# Patient Record
Sex: FEMALE | Race: WHITE | NOT HISPANIC OR LATINO | Employment: FULL TIME | URBAN - METROPOLITAN AREA
[De-identification: names, ages, dates, MRNs, and addresses within clinical notes are randomized per-mention and may not be internally consistent; named-entity substitution may affect disease eponyms.]

---

## 2017-01-03 ENCOUNTER — OFFICE VISIT (OUTPATIENT)
Dept: OTOLARYNGOLOGY | Facility: CLINIC | Age: 57
End: 2017-01-03
Payer: COMMERCIAL

## 2017-01-03 VITALS — BODY MASS INDEX: 23.86 KG/M2 | RESPIRATION RATE: 16 BRPM | WEIGHT: 143.2 LBS | HEIGHT: 65 IN

## 2017-01-03 DIAGNOSIS — R04.0 EPISTAXIS: Primary | ICD-10-CM

## 2017-01-03 PROCEDURE — 99213 OFFICE O/P EST LOW 20 MIN: CPT | Performed by: OTOLARYNGOLOGY

## 2017-01-03 ASSESSMENT — PAIN SCALES - GENERAL: PAINLEVEL: NO PAIN (0)

## 2017-01-03 NOTE — NURSING NOTE
"Chief Complaint   Patient presents with     RECHECK     Nosebleeds       Initial Resp 16  Ht 1.651 m (5' 5\")  Wt 64.955 kg (143 lb 3.2 oz)  BMI 23.83 kg/m2 Estimated body mass index is 23.83 kg/(m^2) as calculated from the following:    Height as of this encounter: 1.651 m (5' 5\").    Weight as of this encounter: 64.955 kg (143 lb 3.2 oz).  BP completed using cuff size: NA (Not Taken)    Ashlee Price MA    "

## 2017-01-03 NOTE — PROGRESS NOTES
Chief Complaint - Epistaxis    History of Present Illness - Lenore Martinez is a 56 year old female who returns to check nose following cautery (right side) following approximately months of epistaxis. I saw her over 1 month ago. Then she notes it usually only comes out the front of the nose, but it has ran down the back of the throat at times. The bleeding was occuring approximately a few times per week. The patient can get the bleeding to stop by with pressure. The patient has never gone to the emergency department or required a blood transfusion due to nose bleeding. The patient has no personal or family history of bleeding disorders. The patient takes no blood-thinning medication.    She returns and notes much improvement with bleeding following cautery on the right side. No more blood dripping or running out. Mild left bleeding, but nothing like the right. Uses vaseline, nasal saline, and a humidifier.    Past Medical History -   Patient Active Problem List   Diagnosis     Conjunctival edema of left eye     Conjunctival injection     Keratitis     PTSD (post-traumatic stress disorder)     CARDIOVASCULAR SCREENING; LDL GOAL LESS THAN 160     Hypovitaminosis D     Fatigue       Current Medications -   Current outpatient prescriptions:      gabapentin (NEURONTIN) 300 MG capsule, Take 300 mg by mouth 3 times daily 1.5 tablets 3 times daily, Disp: , Rfl:      naproxen sodium (ALEVE) 220 MG capsule, Take 220 mg by mouth 2 times daily (with meals)., Disp: , Rfl:      Aspirin-Acetaminophen-Caffeine (EXCEDRIN MIGRAINE PO), Take  by mouth as needed., Disp: , Rfl:      Ibuprofen (MIDOL PO), Take  by mouth as needed., Disp: , Rfl:      lamoTRIgine (LAMICTAL) 100 MG tablet, Take 100 mg by mouth 3 times daily., Disp: , Rfl:      citalopram (CELEXA) 40 MG tablet, Take 40 mg by mouth daily., Disp: , Rfl:     Allergies -   Allergies   Allergen Reactions     Compazine      Anxiety         Social History -   Social History  "    Social History     Marital Status:      Spouse Name: N/A     Number of Children: N/A     Years of Education: N/A     Social History Main Topics     Smoking status: Never Smoker      Smokeless tobacco: Never Used      Comment: smoke free household.     Alcohol Use: No     Drug Use: No     Sexual Activity:     Partners: Male     Other Topics Concern     None     Social History Narrative       Family History -   Family History   Problem Relation Age of Onset     DIABETES Paternal Grandmother      DIABETES Son        Review of Systems - As per HPI and PMHx, otherwise 7 system review of the head and neck negative.    Physical Exam  Resp 16  Ht 1.651 m (5' 5\")  Wt 64.955 kg (143 lb 3.2 oz)  BMI 23.83 kg/m2  General - The patient is in no distress.  Alert and oriented x3, answers questions and cooperates with examination appropriately.   Voice and Breathing - The patient was breathing comfortably without the use of accessory muscles. There was no wheezing, stridor, or stertor.  The patients voice was clear and strong, with no dysphonia.  Head and Face - Normocephalic and atraumatic.    Eyes - Extraocular movements intact. Sclera were not icteric or injected, conjunctiva were pink and moist.  Neurologic - Cranial nerves II-XII are grossly intact. Specifically, the facial nerve is intact, House-Brackmann grade 1 of 6.   Nose - No significant external deformity. The nasal mucosa along the anterior septum on the R side shows minimal dried blood. It is dry. The L side was mostly normal with a tiny spot of blood. The septum was midline, turbinates are of normal size and position.  No polyps, masses, or purulence.    A/P - Lenore Martinez is a 56 year old female with epistaxis. She is better with the cautery on the right. Continue maintenance vaseline and nasal saline and humidifier. The left bleeding is minimal and I didn't see much today so we elected to hold off on that for now. She agrees. Return colemann.     Ramírez " MD Jayjay  Otolaryngology  [facility]

## 2017-01-03 NOTE — PATIENT INSTRUCTIONS
General Scheduling Information  To schedule your CT/MRI scan, please contact [place] [facility] at [phone]   [address] [place] [address]    To schedule your Surgery, please contact our Specialty Schedulers at [phone]    ENT Clinic Locations Clinic Hours Telephone Number     Nathaniel Steel  [address] [place] [address]   Tuesday:       8:00am -- 4:00pm    Wednesday:  8:00am - 4:00pm   To schedule an appointment with   Dr. Ro,   please contact our   Specialty Scheduling Department at:     [phone]       Nathaniel Cobos  [address] [place] [address]   Friday:          8:00am - 4:00pm         Urgent Care Locations Clinic Hours Telephone Numbers     Nathaniel Zheng  [address] [place] [address]     Monday-Friday:     11:00pm - 9:00pm    Saturday-Sunday:  9:00am - 5:00pm   [phone]     Nathaniel Cobos  [address] [place] [address]     Monday-Friday:      5:00pm - 9:00pm     Saturday-Sunday:  9:00am - 5:00pm   [phone]

## 2017-07-15 ENCOUNTER — HEALTH MAINTENANCE LETTER (OUTPATIENT)
Age: 57
End: 2017-07-15

## 2017-09-09 ENCOUNTER — TELEPHONE (OUTPATIENT)
Dept: ENDOCRINOLOGY | Facility: CLINIC | Age: 57
End: 2017-09-09

## 2017-10-13 NOTE — PATIENT INSTRUCTIONS
[facility]    If you have any questions regarding to your visit please contact your care team:       Team Red:   Clinic Hours Telephone Number   Dr. Nona Abarca  (pediatrics)  Norma Buck NP 7am-7pm  Monday - Thursday   7am-5pm  Fridays  [phone] (fax)    [facility]  [phone]   Urgent Care - [place] and [place] Monday-Friday  [place] - 11am-8pm  Saturday-Sunday  Both sites - 9am-5pm  [phone] - [facility]  [phone] - [place]       What options do I have for visits at the clinic other than the traditional office visit?  To expand how we care for you, many of our providers are utilizing electronic visits (e-visits) and telephone visits, when medically appropriate, for interactions with their patients rather than a visit in the clinic.   We also offer nurse visits for many medical concerns. Just like any other service, we will bill your insurance company for this type of visit based on time spent on the phone with your provider. Not all insurance companies cover these visits. Please check with your medical insurance if this type of visit is covered. You will be responsible for any charges that are not paid by your insurance.      E-visits via [employer]:  generally incur a $35.00 fee.  Telephone visits:  Time spent on the phone: *charged based on time that is spent on the phone in increments of 10 minutes. Estimated cost:   5-10 mins $30.00   11-20 mins. $59.00   21-30 mins. $85.00     As always, Thank you for trusting us with your health care needs!    Jaison Estrada    Atrophic Vaginitis    Atrophic vaginitis means the walls of your vagina have become thin. This happens when your body makes too little of the hormone estrogen. Menopause or surgical removal of the ovaries are the most common causes for a drop in estrogen. Breastfeeding can also cause the hormone level to drop.  Symptoms of atrophic vaginitis  include:    Dryness, soreness, burning, or itching in the vagina    Vaginal discharge  Sex can be uncomfortable, even painful. After sex, you may have bleeding from your vagina. You may also have burning or pain when you urinate.  Home care  Your healthcare provider may recommend 1 or more of these as treatment:    Vaginal creams, lotions, and lubricants. These products help relieve vaginal dryness. They don t need a prescription. They can be found in the personal care section of most pharmacies. Creams and lotions are used daily to help keep the vagina moist. Lubricants help reduce dryness and pain during sex. Choose water-based lubricants. Don t use petroleum jelly, mineral oil, or other oils. These increase the chance of infection.     Hormone therapy (HT). HT increases the amount of estrogen in your body. This can help manage or relieve symptoms. HT can be given in pill form. It may be given as a lotion, cream, ring put into the vagina, or a patch on the skin. The risks and benefits of HT vary for each woman. For instance, your risk may be higher if you have had breast cancer. Discuss this treatment with your healthcare provider. Not every woman can use HT.  You don t need to give up (abstain from) sex. In fact, regular sex can help keep vaginal tissues healthy. Take steps to make sex more comfortable by using water-based lubricants.  Preventing infections  Atrophic vaginitis makes an infection of the vagina or the urinary tract more likely. To help reduce your risk:    Keep your genitals clean. When you bathe, wash the outside of your vagina with mild soap and water. Clean gently between the folds of your vagina.    Wipe from front to back after a bowel movement.    Don t douche unless your healthcare provider tells you to.    Avoid scented toilet paper, scented vaginal sprays, and scented tampons.    Avoid wearing clothes that are tight in the crotch. These include pantyhose, jeans, and leggings. Wear cotton  underwear. Change it every day.  Follow-up care  Follow up with your healthcare provider, or as advised.  When to seek medical advice  Call your health care provider right away if any of these occur:    Fever of 100.4 F (38 C) or higher, or as directed by your healthcare provider    Symptoms don t go away or get worse even with treatment    Vaginal area swells or becomes painful    Vaginal area bleeds, but not because of your period    Bad-smelling discharge from the vagina    Pain or burning when you urinate or you have trouble passing urine    Open sores develop around vagina   Date Last Reviewed: 12/1/2016 2000-2017 [employer]. [address] [place] [address], [place] [address]. All rights reserved. This information is not intended as a substitute for professional medical care. Always follow your healthcare professional's instructions.

## 2017-10-13 NOTE — PROGRESS NOTES
"  SUBJECTIVE:   Lenore Martinez is a 57 year old female who presents to clinic today for the following health issues:    Vaginal Symptoms      Duration: 1 month    Description  vaginal discharge     Intensity:  mild    Accompanying signs and symptoms (fever/dysuria/abdominal or back pain): lump of left buttock - nearly resolved after draining spontaneously     History  Sexually active: not at present  Possibility of pregnancy: No  Recent antibiotic use: no     Precipitating or alleviating factors: None    Therapies tried and outcome: none       I have reviewed the patient's medical history in detail and updated the computerized patient record.     ROS:  C: NEGATIVE for fever, chills, change in weight  INTEGUMENTARY/SKIN: as above   GI: NEGATIVE for nausea, abdominal pain, heartburn, or change in bowel habits  : NEGATIVE for frequency, dysuria, or hematuria    OBJECTIVE:     /68 (BP Location: Right arm, Patient Position: Chair, Cuff Size: Adult Regular)  Pulse 76  Temp 97.9  F (36.6  C) (Oral)  Ht 5' 5\" (1.651 m)  Wt 143 lb (64.9 kg)  SpO2 98%  BMI 23.8 kg/m2  Body mass index is 23.8 kg/(m^2).  GENERAL: healthy, alert and no distress   (female): declined   MS: extremities normal- no gross deformities noted  NEURO: mentation intact  PSYCH: affect flat and anxious    Diagnostic Test Results:  Results for orders placed or performed in visit on 10/23/17   Wet prep   Result Value Ref Range    Specimen Description Vagina     Wet Prep No Trichomonas seen     Wet Prep No clue cells seen     Wet Prep No yeast seen     Wet Prep (Note)  MANY WBC SEEN.          ASSESSMENT/PLAN:   (N95.2) Atrophic vaginitis  (primary encounter diagnosis)  (N89.8) Vaginal discharge  Plan: Wet prep        See orders. Discussed risks and benefits of this medication. Call or return to clinic as needed if these symptoms worsen or fail to improve as anticipated.     (Z12.31) Visit for screening mammogram  Plan: MA SCREENING DIGITAL BILAT " - Future  (s+30)              See Patient Instructions    Nona Oropeza MD  [facility]

## 2017-10-23 ENCOUNTER — OFFICE VISIT (OUTPATIENT)
Dept: FAMILY MEDICINE | Facility: CLINIC | Age: 57
End: 2017-10-23
Payer: COMMERCIAL

## 2017-10-23 VITALS
WEIGHT: 143 LBS | BODY MASS INDEX: 23.82 KG/M2 | SYSTOLIC BLOOD PRESSURE: 100 MMHG | HEIGHT: 65 IN | DIASTOLIC BLOOD PRESSURE: 68 MMHG | HEART RATE: 76 BPM | OXYGEN SATURATION: 98 % | TEMPERATURE: 97.9 F

## 2017-10-23 DIAGNOSIS — N95.2 ATROPHIC VAGINITIS: Primary | ICD-10-CM

## 2017-10-23 DIAGNOSIS — Z12.31 VISIT FOR SCREENING MAMMOGRAM: ICD-10-CM

## 2017-10-23 DIAGNOSIS — N89.8 VAGINAL DISCHARGE: ICD-10-CM

## 2017-10-23 LAB
SPECIMEN SOURCE: NORMAL
WET PREP SPEC: NORMAL

## 2017-10-23 PROCEDURE — 99213 OFFICE O/P EST LOW 20 MIN: CPT | Performed by: FAMILY MEDICINE

## 2017-10-23 PROCEDURE — 87210 SMEAR WET MOUNT SALINE/INK: CPT | Performed by: FAMILY MEDICINE

## 2017-10-23 NOTE — MR AVS SNAPSHOT
After Visit Summary   10/23/2017    Lenore Martinez    MRN: [id number]           Patient Information     Date Of Birth          1960        Visit Information        Provider Department      10/23/2017 2:20 PM Nona Oropeza MD [facility]        Today's Diagnoses     Atrophic vaginitis    -  1    Vaginal discharge        Visit for screening mammogram          Care Instructions    [facility]    If you have any questions regarding to your visit please contact your care team:       Team Red:   Clinic Hours Telephone Number   Dr. Nona Abarca  (pediatrics)  Norma Buck NP 7am-7pm  Monday - Thursday   7am-5pm  Fridays  [phone] (fax)    [facility]  [phone]   Urgent Care - [place] and [place] Monday-Friday  [place] - 11am-8pm  Saturday-Sunday  Both sites - 9am-5pm  [phone] - [facility]  [phone] - [place]       What options do I have for visits at the clinic other than the traditional office visit?  To expand how we care for you, many of our providers are utilizing electronic visits (e-visits) and telephone visits, when medically appropriate, for interactions with their patients rather than a visit in the clinic.   We also offer nurse visits for many medical concerns. Just like any other service, we will bill your insurance company for this type of visit based on time spent on the phone with your provider. Not all insurance companies cover these visits. Please check with your medical insurance if this type of visit is covered. You will be responsible for any charges that are not paid by your insurance.      E-visits via [employer]:  generally incur a $35.00 fee.  Telephone visits:  Time spent on the phone: *charged based on time that is spent on the phone in increments of 10 minutes. Estimated cost:   5-10 mins $30.00   11-20 mins. $59.00   21-30 mins. $85.00     As always, Thank you for  trusting us with your health care needs!    Jaisno Estrada    Atrophic Vaginitis    Atrophic vaginitis means the walls of your vagina have become thin. This happens when your body makes too little of the hormone estrogen. Menopause or surgical removal of the ovaries are the most common causes for a drop in estrogen. Breastfeeding can also cause the hormone level to drop.  Symptoms of atrophic vaginitis include:    Dryness, soreness, burning, or itching in the vagina    Vaginal discharge  Sex can be uncomfortable, even painful. After sex, you may have bleeding from your vagina. You may also have burning or pain when you urinate.  Home care  Your healthcare provider may recommend 1 or more of these as treatment:    Vaginal creams, lotions, and lubricants. These products help relieve vaginal dryness. They don t need a prescription. They can be found in the personal care section of most pharmacies. Creams and lotions are used daily to help keep the vagina moist. Lubricants help reduce dryness and pain during sex. Choose water-based lubricants. Don t use petroleum jelly, mineral oil, or other oils. These increase the chance of infection.     Hormone therapy (HT). HT increases the amount of estrogen in your body. This can help manage or relieve symptoms. HT can be given in pill form. It may be given as a lotion, cream, ring put into the vagina, or a patch on the skin. The risks and benefits of HT vary for each woman. For instance, your risk may be higher if you have had breast cancer. Discuss this treatment with your healthcare provider. Not every woman can use HT.  You don t need to give up (abstain from) sex. In fact, regular sex can help keep vaginal tissues healthy. Take steps to make sex more comfortable by using water-based lubricants.  Preventing infections  Atrophic vaginitis makes an infection of the vagina or the urinary tract more likely. To help reduce your risk:    Keep your genitals clean. When you  bathe, wash the outside of your vagina with mild soap and water. Clean gently between the folds of your vagina.    Wipe from front to back after a bowel movement.    Don t douche unless your healthcare provider tells you to.    Avoid scented toilet paper, scented vaginal sprays, and scented tampons.    Avoid wearing clothes that are tight in the crotch. These include pantyhose, jeans, and leggings. Wear cotton underwear. Change it every day.  Follow-up care  Follow up with your healthcare provider, or as advised.  When to seek medical advice  Call your health care provider right away if any of these occur:    Fever of 100.4 F (38 C) or higher, or as directed by your healthcare provider    Symptoms don t go away or get worse even with treatment    Vaginal area swells or becomes painful    Vaginal area bleeds, but not because of your period    Bad-smelling discharge from the vagina    Pain or burning when you urinate or you have trouble passing urine    Open sores develop around vagina   Date Last Reviewed: 12/1/2016 2000-2017 [employer]. [address] [place] [address]. All rights reserved. This information is not intended as a substitute for professional medical care. Always follow your healthcare professional's instructions.                Follow-ups after your visit        Follow-up notes from your care team     Return if symptoms worsen or fail to improve, for physical.      Future tests that were ordered for you today     Open Future Orders        Priority Expected Expires Ordered    MA SCREENING DIGITAL BILAT - Future  (s+30) Routine  10/23/2018 10/23/2017            Who to contact     If you have questions or need follow up information about today's clinic visit or your schedule please contact [facility] directly at [phone].  Normal or non-critical lab and imaging results will be communicated to you by MyChart, letter or phone within 4 business days after the  "clinic has received the results. If you do not hear from us within 7 days, please contact the clinic through [facility] or phone. If you have a critical or abnormal lab result, we will notify you by phone as soon as possible.  Submit refill requests through [facility] or call your pharmacy and they will forward the refill request to us. Please allow 3 business days for your refill to be completed.          Additional Information About Your Visit        [employer] Information     [facility] gives you secure access to your electronic health record. If you see a primary care provider, you can also send messages to your care team and make appointments. If you have questions, please call your primary care clinic.  If you do not have a primary care provider, please call [phone] and they will assist you.        Care EveryWhere ID     This is your Care EveryWhere ID. This could be used by other organizations to access your [place] medical records  [id number]        Your Vitals Were     Pulse Temperature Height Pulse Oximetry BMI (Body Mass Index)       76 97.9  F (36.6  C) (Oral) 5' 5\" (1.651 m) 98% 23.8 kg/m2        Blood Pressure from Last 3 Encounters:   10/23/17 100/68   01/14/16 107/70   03/20/15 94/66    Weight from Last 3 Encounters:   10/23/17 143 lb (64.9 kg)   01/03/17 143 lb 3.2 oz (65 kg)   11/30/16 142 lb 12.8 oz (64.8 kg)              We Performed the Following     Wet prep          Today's Medication Changes          These changes are accurate as of: 10/23/17  3:28 PM.  If you have any questions, ask your nurse or doctor.               Start taking these medicines.        Dose/Directions    conjugated estrogens cream   Commonly known as:  PREMARIN   Used for:  Atrophic vaginitis   Started by:  Nona Oropeza MD        Dose:  1 g   Place 1 g vaginally twice a week   Quantity:  60 g   Refills:  11            Where to get your medicines      [id number] [place] - " [address] [facility] [address] [place]  [address], [place] [address]     Phone:  [phone]     conjugated estrogens cream                Primary Care Provider Office Phone # Fax #    Jewel Snyder [phone] [address]  [place] [address]        Equal Access to Services     [facility] : Hadii aad ku hadasho Soomaali, waaxda luqadaha, qaybta kaalmada adeegyada, waxay idiin hayaan adeeg kharash la'aan ah. So [facility] [phone].    ATENCIÓN: Si habla español, tiene a carter disposición servicios gratuitos de asistencia lingüística. Llame al [phone].    We comply with applicable federal civil rights laws and [place] laws. We do not discriminate on the basis of race, color, national origin, age, disability, sex, sexual orientation, or gender identity.            Thank you!     Thank you for choosing [facility]  for your care. Our goal is always to provide you with excellent care. Hearing back from our patients is one way we can continue to improve our services. Please take a few minutes to complete the written survey that you may receive in the mail after your visit with us. Thank you!             Your Updated Medication List - Protect others around you: Learn how to safely use, store and throw away your medicines at www.disposemymeds.org.          This list is accurate as of: 10/23/17  3:28 PM.  Always use your most recent med list.                   Brand Name Dispense Instructions for use Diagnosis    citalopram 40 MG tablet    celeXA     Take 40 mg by mouth daily.        conjugated estrogens cream    PREMARIN    60 g    Place 1 g vaginally twice a week    Atrophic vaginitis       EXCEDRIN MIGRAINE PO      Take  by mouth as needed.        gabapentin 300 MG capsule    NEURONTIN     Take 300 mg by mouth 3 times daily 1.5 tablets 3 times daily    Abdominal pain, other specified site       lamoTRIgine 100 MG tablet    LaMICtal     Take  100 mg by mouth 3 times daily.        MIDOL PO      Take  by mouth as needed.        VITAMIN D (CHOLECALCIFEROL) PO      Take by mouth daily

## 2017-11-10 ENCOUNTER — TELEPHONE (OUTPATIENT)
Dept: FAMILY MEDICINE | Facility: CLINIC | Age: 57
End: 2017-11-10

## 2017-11-10 NOTE — LETTER
[facility]  [address] [place] [address]    November 20, 2017    Lenore Martinez  [address] [place] [address]      Dear Lenore,      Monitoring and managing your preventative and chronic health conditions are very important to us. Our records indicate that you have not scheduled for mammogram  which was recommended by Dr.Reuben Snyder.      If you have received your health care elsewhere, please call the clinic so the information can be documented in your chart.    Please call [phone] or message us through your [employer] account to schedule an appointment or provide information for your chart.     Feel free to contact us if you have any questions or concerns!    I look forward to seeing you and working with you on your health care needs.     Sincerely,         Jewel Snyder MD/dt        *If you have already scheduled an appointment, please disregard this reminder

## 2017-11-10 NOTE — TELEPHONE ENCOUNTER
Lenore is due for a routine Mammogram. Please contact the patient to schedule.    Thank you,  Sravani Avendano,   With Nona Oropeza MD

## 2017-11-20 NOTE — TELEPHONE ENCOUNTER
3rd attempt to reach patient to schedule a screening mammogram.  No answer.  Please send a reminder letter.  WENDY Weldon

## 2018-01-30 ENCOUNTER — TELEPHONE (OUTPATIENT)
Dept: FAMILY MEDICINE | Facility: CLINIC | Age: 58
End: 2018-01-30

## 2018-01-30 NOTE — TELEPHONE ENCOUNTER
Reason for call:question  Patient called regarding (reason for call): Patient is stating she has the stomach flu and is not able to keep her meds down and would like to know what to do?  Additional comments: Please call patient to discuss further    Phone number to reach patient:  Home number on file [phone] (home)    Best Time:  anytime    Can we leave a detailed message on this number?  YES

## 2018-01-30 NOTE — TELEPHONE ENCOUNTER
Spoke with patient.   Patient stated she was finally able to keep her meds down and is feeling a little buit better.  RN advised of home care advise:  Sprite/genger freda and bland foods until stomach settles. Drink as much fluid as possible to avoid dehydration and to keep the clinic updated if anything changes.   Patient verbalized understanding.    Sandy Garcia RN

## 2018-07-24 NOTE — PATIENT INSTRUCTIONS
[facility]    If you have any questions regarding to your visit please contact your care team:       Team Red:   Clinic Hours Telephone Number   Dr. Nona Buck, NP   7am-7pm  Monday - Thursday   7am-5pm  Fridays  [phone]  (Appointment scheduling available 24/7)    Questions about your recent visit?   Team Line  [phone]   Ur[facility] - [place] [facility] - 11am-9pm Monday-Friday Saturday-Sunday- 9am-5pm   [place] - 5pm-9pm Monday-Friday Saturday-Sunday- 9am-5pm  [phone] - [place]  [phone] - [place]       What options do I have for a visit other than an office visit? We offer electronic visits (e-visits) and telephone visits, when medically appropriate.  Please check with your medical insurance to see if these types of visits are covered, as you will be responsible for any charges that are not paid by your insurance.      You can use [employer] (secure electronic communication) to access to your chart, send your primary care provider a message, or make an appointment. Ask a team member how to get started.     For a price quote for your services, please call our Consumer Price Line at [phone] or our Imaging Cost estimation line at [phone] (for imaging tests).    Melanie HARPER MA      Preventive Health Recommendations  Female Ages 50 - 64    Yearly exam: See your health care provider every year in order to  o Review health changes.   o Discuss preventive care.    o Review your medicines if your doctor has prescribed any.      Get a Pap test every three years (unless you have an abnormal result and your provider advises testing more often).    If you get Pap tests with HPV test, you only need to test every 5 years, unless you have an abnormal result.     You do not need a Pap test if your uterus was removed (hysterectomy) and you have not had cancer.    You should be tested each year for STDs  (sexually transmitted diseases) if you're at risk.     Have a mammogram every 1 to 2 years.    Have a colonoscopy at age 50, or have a yearly FIT test (stool test). These exams screen for colon cancer.      Have a cholesterol test every 5 years, or more often if advised.    Have a diabetes test (fasting glucose) every three years. If you are at risk for diabetes, you should have this test more often.     If you are at risk for osteoporosis (brittle bone disease), think about having a bone density scan (DEXA).    Shots: Get a flu shot each year. Get a tetanus shot every 10 years.    Nutrition:     Eat at least 5 servings of fruits and vegetables each day.    Eat whole-grain bread, whole-wheat pasta and brown rice instead of white grains and rice.    Get adequate Calcium and Vitamin D.     Lifestyle    Exercise at least 150 minutes a week (30 minutes a day, 5 days a week). This will help you control your weight and prevent disease.    Limit alcohol to one drink per day.    No smoking.     Wear sunscreen to prevent skin cancer.     See your dentist every six months for an exam and cleaning.    See your eye doctor every 1 to 2 years.

## 2018-07-26 ENCOUNTER — RADIANT APPOINTMENT (OUTPATIENT)
Dept: MAMMOGRAPHY | Facility: CLINIC | Age: 58
End: 2018-07-26
Payer: COMMERCIAL

## 2018-07-26 ENCOUNTER — OFFICE VISIT (OUTPATIENT)
Dept: FAMILY MEDICINE | Facility: CLINIC | Age: 58
End: 2018-07-26
Payer: COMMERCIAL

## 2018-07-26 VITALS
HEIGHT: 66 IN | DIASTOLIC BLOOD PRESSURE: 60 MMHG | RESPIRATION RATE: 20 BRPM | HEART RATE: 85 BPM | WEIGHT: 143 LBS | BODY MASS INDEX: 22.98 KG/M2 | TEMPERATURE: 98.2 F | OXYGEN SATURATION: 98 % | SYSTOLIC BLOOD PRESSURE: 94 MMHG

## 2018-07-26 DIAGNOSIS — Z12.31 VISIT FOR SCREENING MAMMOGRAM: ICD-10-CM

## 2018-07-26 DIAGNOSIS — Z71.89 ADVANCED DIRECTIVES, COUNSELING/DISCUSSION: ICD-10-CM

## 2018-07-26 DIAGNOSIS — E55.9 HYPOVITAMINOSIS D: ICD-10-CM

## 2018-07-26 DIAGNOSIS — N89.8 VAGINAL DISCHARGE: ICD-10-CM

## 2018-07-26 DIAGNOSIS — F31.9 BIPOLAR DISEASE, CHRONIC (H): ICD-10-CM

## 2018-07-26 DIAGNOSIS — Z00.00 ROUTINE HISTORY AND PHYSICAL EXAMINATION OF ADULT: Primary | ICD-10-CM

## 2018-07-26 LAB
CHOLEST SERPL-MCNC: 191 MG/DL
GLUCOSE SERPL-MCNC: 79 MG/DL (ref 70–99)
HDLC SERPL-MCNC: 61 MG/DL
LDLC SERPL CALC-MCNC: 113 MG/DL
NONHDLC SERPL-MCNC: 130 MG/DL
SPECIMEN SOURCE: NORMAL
TRIGL SERPL-MCNC: 87 MG/DL
WET PREP SPEC: NORMAL

## 2018-07-26 PROCEDURE — 87389 HIV-1 AG W/HIV-1&-2 AB AG IA: CPT | Performed by: FAMILY MEDICINE

## 2018-07-26 PROCEDURE — 77063 BREAST TOMOSYNTHESIS BI: CPT | Mod: TC

## 2018-07-26 PROCEDURE — 82947 ASSAY GLUCOSE BLOOD QUANT: CPT | Performed by: FAMILY MEDICINE

## 2018-07-26 PROCEDURE — 82306 VITAMIN D 25 HYDROXY: CPT | Performed by: FAMILY MEDICINE

## 2018-07-26 PROCEDURE — 87210 SMEAR WET MOUNT SALINE/INK: CPT | Performed by: FAMILY MEDICINE

## 2018-07-26 PROCEDURE — 77067 SCR MAMMO BI INCL CAD: CPT | Mod: TC

## 2018-07-26 PROCEDURE — 36415 COLL VENOUS BLD VENIPUNCTURE: CPT | Performed by: FAMILY MEDICINE

## 2018-07-26 PROCEDURE — 99396 PREV VISIT EST AGE 40-64: CPT | Performed by: FAMILY MEDICINE

## 2018-07-26 PROCEDURE — 80061 LIPID PANEL: CPT | Performed by: FAMILY MEDICINE

## 2018-07-26 RX ORDER — MULTIVIT-MIN/IRON/FOLIC ACID/K 18-600-40
1000 CAPSULE ORAL DAILY
COMMUNITY
Start: 2018-07-26

## 2018-07-26 RX ORDER — CITALOPRAM HYDROBROMIDE 20 MG/1
20 TABLET ORAL 3 TIMES DAILY
COMMUNITY

## 2018-07-26 NOTE — PROGRESS NOTES
Your results are normal.  Your final test results are pending.  Please check your chart again within 3 to 5 days. You will receive further instruction when your full test result panel is complete.    Nona Oropeza MD

## 2018-07-26 NOTE — MR AVS SNAPSHOT
After Visit Summary   7/26/2018    Lenore Martinez    MRN: [id number]           Patient Information     Date Of Birth          1960        Visit Information        Provider Department      7/26/2018 2:20 PM Nona Oropeza MD [facility]        Today's Diagnoses     Routine history and physical examination of adult    -  1    Bipolar disease, chronic (H)        Vaginal discharge        Hypovitaminosis D        Advanced directives, counseling/discussion        Visit for screening mammogram          Care Instructions    [facility]    If you have any questions regarding to your visit please contact your care team:       Team Red:   Clinic Hours Telephone Number   Dr. Nona Buck, NP   7am-7pm  Monday - Thursday   7am-5pm  Fridays  [phone]  (Appointment scheduling available 24/7)    Questions about your recent visit?   Team Line  [phone]   Urgent Care - [place] and [facility] - 11am-9pm Monday-Friday Saturday-Sunday- 9am-5pm   [place] - 5pm-9pm Monday-Friday Saturday-Sunday- 9am-5pm  [phone] - [place]  [phone] - [place]       What options do I have for a visit other than an office visit? We offer electronic visits (e-visits) and telephone visits, when medically appropriate.  Please check with your medical insurance to see if these types of visits are covered, as you will be responsible for any charges that are not paid by your insurance.      You can use [employer] (secure electronic communication) to access to your chart, send your primary care provider a message, or make an appointment. Ask a team member how to get started.     For a price quote for your services, please call our Consumer Price Line at [phone] or our Imaging Cost estimation line at [phone] (for imaging tests).    Melanie HARPER MA      Preventive Health Recommendations  Female Ages 50 - 64    Yearly exam:  See your health care provider every year in order to  o Review health changes.   o Discuss preventive care.    o Review your medicines if your doctor has prescribed any.      Get a Pap test every three years (unless you have an abnormal result and your provider advises testing more often).    If you get Pap tests with HPV test, you only need to test every 5 years, unless you have an abnormal result.     You do not need a Pap test if your uterus was removed (hysterectomy) and you have not had cancer.    You should be tested each year for STDs (sexually transmitted diseases) if you're at risk.     Have a mammogram every 1 to 2 years.    Have a colonoscopy at age 50, or have a yearly FIT test (stool test). These exams screen for colon cancer.      Have a cholesterol test every 5 years, or more often if advised.    Have a diabetes test (fasting glucose) every three years. If you are at risk for diabetes, you should have this test more often.     If you are at risk for osteoporosis (brittle bone disease), think about having a bone density scan (DEXA).    Shots: Get a flu shot each year. Get a tetanus shot every 10 years.    Nutrition:     Eat at least 5 servings of fruits and vegetables each day.    Eat whole-grain bread, whole-wheat pasta and brown rice instead of white grains and rice.    Get adequate Calcium and Vitamin D.     Lifestyle    Exercise at least 150 minutes a week (30 minutes a day, 5 days a week). This will help you control your weight and prevent disease.    Limit alcohol to one drink per day.    No smoking.     Wear sunscreen to prevent skin cancer.     See your dentist every six months for an exam and cleaning.    See your eye doctor every 1 to 2 years.            Follow-ups after your visit        Follow-up notes from your care team     Return in about 1 year (around 7/26/2019) for physical.      Who to contact     If you have questions or need follow up information about today's clinic visit or your  "schedule please contact [facility] directly at [phone].  Normal or non-critical lab and imaging results will be communicated to you by MyChart, letter or phone within 4 business days after the clinic has received the results. If you do not hear from us within 7 days, please contact the clinic through Clipboard[employer] or phone. If you have a critical or abnormal lab result, we will notify you by phone as soon as possible.  Submit refill requests through P[employer]ette or call your pharmacy and they will forward the refill request to us. Please allow 3 business days for your refill to be completed.          Additional Information About Your Visit        [employer] Information     Pipette gives you secure access to your electronic health record. If you see a primary care provider, you can also send messages to your care team and make appointments. If you have questions, please call your primary care clinic.  If you do not have a primary care provider, please call [phone] and they will assist you.        Care EveryWhere ID     This is your Care EveryWhere ID. This could be used by other organizations to access your [facility] medical records  [id number]        Your Vitals Were     Pulse Temperature Respirations Height Pulse Oximetry Breastfeeding?    85 98.2  F (36.8  C) (Oral) 20 5' 6\" (1.676 m) 98% No    BMI (Body Mass Index)                   23.08 kg/m2            Blood Pressure from Last 3 Encounters:   07/26/18 94/60   10/23/17 100/68   01/14/16 107/70    Weight from Last 3 Encounters:   07/26/18 143 lb (64.9 kg)   10/23/17 143 lb (64.9 kg)   01/03/17 143 lb 3.2 oz (65 kg)              We Performed the Following     GLUCOSE     HIV Screening     Lipid panel reflex to direct LDL Non-fasting     Vitamin D Deficiency     Wet prep          Today's Medication Changes          These changes are accurate as of 7/26/18  3:01 PM.  If you have any questions, ask your nurse or doctor.               These medicines have " changed or have updated prescriptions.        Dose/Directions    Vitamin D (Cholecalciferol) 1000 units Tabs   This may have changed:    - medication strength  - how much to take   Changed by:  Nona Oropeza MD        Dose:  1000 mg   Take 1,000 mg by mouth daily   Refills:  0                Primary Care Provider Office Phone # Fax #    Jewel Snyder [phone] [address] [facility] [address]        Equal Access to Services     [facility] AH: Hadii aad ku hadasho Soomaali, waaxda luqadaha, qaybta kaalmada adeegyada, waxay idiin hayaan adeeg kharash la'aan . So [facility] [phone].    ATENCIÓN: Si habla español, tiene a carter disposición servicios gratuitos de asistencia lingüística. [facility] [phone].    We comply with applicable federal civil rights laws and [place] laws. We do not discriminate on the basis of race, color, national origin, age, disability, sex, sexual orientation, or gender identity.            Thank you!     Thank you for choosing [facility]  for your care. Our goal is always to provide you with excellent care. Hearing back from our patients is one way we can continue to improve our services. Please take a few minutes to complete the written survey that you may receive in the mail after your visit with us. Thank you!             Your Updated Medication List - Protect others around you: Learn how to safely use, store and throw away your medicines at www.disposemymeds.org.          This list is accurate as of 7/26/18  3:01 PM.  Always use your most recent med list.                   Brand Name Dispense Instructions for use Diagnosis    * citalopram 40 MG tablet    celeXA     Take 40 mg by mouth daily.        * citalopram 20 MG tablet    celeXA     Take 20 mg by mouth daily        EXCEDRIN MIGRAINE PO      Take  by mouth as needed.        gabapentin 300 MG capsule    NEURONTIN     Take 400 mg by mouth 2 times daily    Abdominal pain, other specified  site       lamoTRIgine 100 MG tablet    LaMICtal     Take 100 mg by mouth 3 times daily.        Vitamin D (Cholecalciferol) 1000 units Tabs      Take 1,000 mg by mouth daily        * Notice:  This list has 2 medication(s) that are the same as other medications prescribed for you. Read the directions carefully, and ask your doctor or other care provider to review them with you.

## 2018-07-26 NOTE — PROGRESS NOTES
SUBJECTIVE:   CC: Lenore Martinez is an 58 year old woman  with bipolar disorder who presents for preventive health visit.     She has occasional clear vaginal discharge and irritation.     Physical   Annual:     Getting at least 3 servings of Calcium per day:  Yes    Bi-annual eye exam:  Yes    Dental care twice a year:  Yes    Sleep apnea or symptoms of sleep apnea:  None    Diet:  Other    Frequency of exercise:  1 day/week    Duration of exercise:  Less than 15 minutes    Taking medications regularly:  Yes    Medication side effects:  None    Additional concerns today:  YES        Clear mucus discharge    Today's PHQ-2 Score:   PHQ-2 (  [employer]) 2018   Q1: Little interest or pleasure in doing things 0   Q2: Feeling down, depressed or hopeless 0   PHQ-2 Score 0   Q1: Little interest or pleasure in doing things Not at all   Q2: Feeling down, depressed or hopeless Not at all   PHQ-2 Score 0       Abuse: Current or Past(Physical, Sexual or Emotional)- No  Do you feel safe in your environment - Yes    Social History   Substance Use Topics     Smoking status: Never Smoker     Smokeless tobacco: Never Used      Comment: smoke free household.     Alcohol use No     Alcohol Use 2018   If you drink alcohol do you typically have greater than 3 drinks per day OR greater than 7 drinks per week? Not Applicable       Reviewed orders with patient.  Reviewed health maintenance and updated orders accordingly - Yes  Patient Active Problem List   Diagnosis     PTSD (post-traumatic stress disorder)     CARDIOVASCULAR SCREENING; LDL GOAL LESS THAN 160     Hypovitaminosis D     Neuropathy     Migraines     Bipolar disease, chronic (H)     Past Surgical History:   Procedure Laterality Date     HC REMOVAL OF OVARIAN CYST(S)       HC TOOTH EXTRACTION W/FORCEP       HYSTERECTOMY, PAP NO LONGER INDICATED  10/1/2009    has ovaries     MYRINGOTOMY      as  a child       Social History   Substance Use Topics      Smoking status: Never Smoker     Smokeless tobacco: Never Used      Comment: smoke free household.     Alcohol use No     Family History   Problem Relation Age of Onset     Diabetes Paternal Grandmother      Diabetes Son      Bipolar Disorder Son      Coronary Artery Disease No family hx of      Cancer No family hx of          Current Outpatient Prescriptions   Medication Sig Dispense Refill     Aspirin-Acetaminophen-Caffeine (EXCEDRIN MIGRAINE PO) Take  by mouth as needed.       citalopram (CELEXA) 20 MG tablet Take 20 mg by mouth daily       citalopram (CELEXA) 40 MG tablet Take 40 mg by mouth daily.       gabapentin (NEURONTIN) 300 MG capsule Take 400 mg by mouth 2 times daily        lamoTRIgine (LAMICTAL) 100 MG tablet Take 100 mg by mouth 3 times daily.       Vitamin D, Cholecalciferol, 1000 units TABS Take 1,000 mg by mouth daily       Allergies   Allergen Reactions     Compazine      Anxiety         Patient over age 50, mutual decision to screen reflected in health maintenance.    Pertinent mammograms are reviewed under the imaging tab.  History of abnormal Pap smear: Status post benign hysterectomy. Health Maintenance and Surgical History updated.     Reviewed and updated as needed this visit by clinical staff  Tobacco  Allergies  Meds  Problems  Med Hx  Surg Hx  Fam Hx  Soc Hx          Reviewed and updated as needed this visit by Provider  Allergies  Meds  Problems  Med Hx  Surg Hx  Fam Hx        Past Medical History:   Diagnosis Date     Anxiety      Bipolar disease, chronic (H)      Hypovitaminosis D 5/12/2014     Iron deficiency      Migraines      Neuropathy      PTSD (post-traumatic stress disorder) 4/16/2013    Diagnosed 11/2005        Review of Systems  CONSTITUTIONAL: NEGATIVE for fever, chills, change in weight  INTEGUMENTARY/SKIN: NEGATIVE for worrisome rashes, moles or lesions  EYES: NEGATIVE for vision changes or irritation  ENT: NEGATIVE for ear, mouth and throat problems  RESP:  "NEGATIVE for significant cough or SOB  BREAST: NEGATIVE for masses, tenderness or discharge  CV: NEGATIVE for chest pain, palpitations or peripheral edema  GI: NEGATIVE for nausea, abdominal pain, heartburn, or change in bowel habits   menopausal female: as above   MUSCULOSKELETAL: NEGATIVE for significant arthralgias or myalgia  NEURO: NEGATIVE for weakness, dizziness or paresthesias  PSYCHIATRIC: NEGATIVE for changes in mood or affect      OBJECTIVE:   BP 94/60  Pulse 85  Temp 98.2  F (36.8  C) (Oral)  Resp 20  Ht 5' 6\" (1.676 m)  Wt 143 lb (64.9 kg)  SpO2 98%  Breastfeeding? No  BMI 23.08 kg/m2  Physical Exam  GENERAL: healthy, alert and no distress  EYES: Eyes grossly normal to inspection, PERRL and conjunctivae and sclerae normal  HENT: ear canals and TM's normal, nose and mouth without ulcers or lesions  NECK: no adenopathy, no asymmetry, masses, or scars and thyroid normal to palpation  RESP: lungs clear to auscultation - no rales, rhonchi or wheezes  BREAST: normal without masses, tenderness or nipple discharge and no palpable axillary masses or adenopathy  CV: regular rate and rhythm, normal S1 S2, no S3 or S4, no murmur, click or rub, no peripheral edema and peripheral pulses strong  ABDOMEN: soft, nontender, no hepatosplenomegaly, no masses and bowel sounds normal  MS: no gross musculoskeletal defects noted, no edema  SKIN: no suspicious lesions or rashes  NEURO: Normal strength and tone, mentation intact and speech normal  PSYCH: mentation appears normal, affect normal/bright and with somewhat rambling speech     Diagnostic Test Results:  Results for orders placed or performed in visit on 07/26/18   Wet prep   Result Value Ref Range    Specimen Description Vagina     Wet Prep No Trichomonas seen     Wet Prep No clue cells seen     Wet Prep No yeast seen        ASSESSMENT/PLAN:   (Z00.00) Routine history and physical examination of adult  (primary encounter diagnosis)  Plan: HIV Screening, " "GLUCOSE, Lipid panel reflex to         direct LDL Non-fasting          (F31.9) Bipolar disease, chronic (H)  Plan: GLUCOSE        Continue plan per psychiatry     (N89.8) Vaginal discharge  Plan: Wet prep          COUNSELING:  Reviewed preventive health counseling, as reflected in patient instructions  Special attention given to:        Regular exercise       Healthy diet/nutrition       Osteoporosis Prevention/Bone Health       Colon cancer screening       Consider Hep C screening for patients born between 1945 and 1965       HIV screeninx in teen years, 1x in adult years, and at intervals if high risk       (Lor)menopause management       The ASCVD Risk score ([place] DOMINGUEZ Jr, et al., 2013) failed to calculate for the following reasons:    Cannot find a previous HDL lab    Cannot find a previous total cholesterol lab       Advance Care Planning    BP Readings from Last 1 Encounters:   18 94/60     Estimated body mass index is 23.08 kg/(m^2) as calculated from the following:    Height as of this encounter: 5' 6\" (1.676 m).    Weight as of this encounter: 143 lb (64.9 kg).           reports that she has never smoked. She has never used smokeless tobacco.      Counseling Resources:  ATP IV Guidelines  Pooled Cohorts Equation Calculator  Breast Cancer Risk Calculator  FRAX Risk Assessment  ICSI Preventive Guidelines  Dietary Guidelines for Americans,   USDA's MyPlate  ASA Prophylaxis  Lung CA Screening    Nona Oropeza MD  [facility]  "

## 2018-07-27 LAB
DEPRECATED CALCIDIOL+CALCIFEROL SERPL-MC: 35 UG/L (ref 20–75)
HIV 1+2 AB+HIV1 P24 AG SERPL QL IA: NONREACTIVE

## 2018-12-17 ENCOUNTER — OFFICE VISIT (OUTPATIENT)
Dept: OBGYN | Facility: CLINIC | Age: 58
End: 2018-12-17
Payer: COMMERCIAL

## 2018-12-17 VITALS
SYSTOLIC BLOOD PRESSURE: 101 MMHG | WEIGHT: 141 LBS | HEART RATE: 82 BPM | BODY MASS INDEX: 22.76 KG/M2 | OXYGEN SATURATION: 99 % | DIASTOLIC BLOOD PRESSURE: 65 MMHG

## 2018-12-17 DIAGNOSIS — R10.2 PELVIC PRESSURE IN FEMALE: ICD-10-CM

## 2018-12-17 DIAGNOSIS — N89.8 VAGINAL DISCHARGE: Primary | ICD-10-CM

## 2018-12-17 LAB
SPECIMEN SOURCE: NORMAL
WET PREP SPEC: NORMAL

## 2018-12-17 PROCEDURE — 87210 SMEAR WET MOUNT SALINE/INK: CPT | Performed by: OBSTETRICS & GYNECOLOGY

## 2018-12-17 PROCEDURE — 99203 OFFICE O/P NEW LOW 30 MIN: CPT | Performed by: OBSTETRICS & GYNECOLOGY

## 2018-12-17 NOTE — PROGRESS NOTES
"SUBJECTIVE:  Lenore Martinez is a 58 year old  who presents to evaluate vaginal discharge.  She is s/p laparotomy for ovarian cystectomy, total hysterectomy without oophorectomy/salpingectomy.  She had had a prior TL, had an SAB and two SVDs.  About one year ago she noted pelvic pressure, and more difficulty emptying her bladder.  This summer she had an episode of discharge per vagina that \"felt like bleeding\" but she did not see any blood, has not seen any blood per vagina since then.  She has however had a couple more episodes of a copious \"gold\" discharge on the pad, will seem to be daily for a while and then intermittent.  No abdominal pain, no vulvar itching or discomfort.    OBJECTIVE: /65   Pulse 82   Wt 64 kg (141 lb)   SpO2 99%   Breastfeeding? No   BMI 22.76 kg/m       Pelvic exam:  External genitalia appeared atrophic, otherwise normal without lesion.  Urethral meatus, urethra, bladder, perineum, and anus appeared normal. Vagina appeared normal, without lesion.  Scant discharge seen.  Bimanual exam revealed no pelvic masses nor tenderness.  Rectovaginal deferred.        ASSESSMENT:   Discharge, vaginal vs urologic.      PLAN: Discussed findings, options.  Wet prep is pending.  Will plan pelvic ultrasound to assess ovaries.  If wet prep negative and ultrasound reassuring, she will consult with Urology (disussed Dr. Wilkes's practive).      Please note greater than 50% of this 30 minute appointment were spent in counseling with the patient of the issues described above in the history of present illness and in the plan, including evaluation and managment of pelvic pressure, vaginal discharge, urinary symptoms.    "

## 2018-12-17 NOTE — NURSING NOTE
"Chief Complaint   Patient presents with     Pelvic Pain       Initial /65   Pulse 82   Wt 64 kg (141 lb)   SpO2 99%   Breastfeeding? No   BMI 22.76 kg/m   Estimated body mass index is 22.76 kg/m  as calculated from the following:    Height as of 18: 1.676 m (5' 6\").    Weight as of this encounter: 64 kg (141 lb).  BP completed using cuff size: large    Questioned patient about current smoking habits.  Pt. has never smoked.          The following HM Due: NONE      The following patient reported/Care Every where data was sent to:  P ABSTRACT QUALITY INITIATIVES [[id number]]  none      n/a              "

## 2018-12-27 ENCOUNTER — ANCILLARY PROCEDURE (OUTPATIENT)
Dept: ULTRASOUND IMAGING | Facility: CLINIC | Age: 58
End: 2018-12-27
Attending: OBSTETRICS & GYNECOLOGY
Payer: COMMERCIAL

## 2018-12-27 DIAGNOSIS — N89.8 VAGINAL DISCHARGE: ICD-10-CM

## 2018-12-27 DIAGNOSIS — R10.2 PELVIC PRESSURE IN FEMALE: ICD-10-CM

## 2018-12-27 PROCEDURE — 76830 TRANSVAGINAL US NON-OB: CPT | Performed by: OBSTETRICS & GYNECOLOGY

## 2019-03-29 ENCOUNTER — OFFICE VISIT (OUTPATIENT)
Dept: URGENT CARE | Facility: URGENT CARE | Age: 59
End: 2019-03-29
Payer: COMMERCIAL

## 2019-03-29 ENCOUNTER — ANCILLARY PROCEDURE (OUTPATIENT)
Dept: GENERAL RADIOLOGY | Facility: CLINIC | Age: 59
End: 2019-03-29
Attending: FAMILY MEDICINE
Payer: COMMERCIAL

## 2019-03-29 ENCOUNTER — TELEPHONE (OUTPATIENT)
Dept: FAMILY MEDICINE | Facility: CLINIC | Age: 59
End: 2019-03-29

## 2019-03-29 VITALS
DIASTOLIC BLOOD PRESSURE: 63 MMHG | SYSTOLIC BLOOD PRESSURE: 97 MMHG | HEART RATE: 96 BPM | TEMPERATURE: 98.1 F | OXYGEN SATURATION: 97 %

## 2019-03-29 DIAGNOSIS — R06.2 WHEEZING: Primary | ICD-10-CM

## 2019-03-29 DIAGNOSIS — K21.9 GASTROESOPHAGEAL REFLUX DISEASE WITHOUT ESOPHAGITIS: ICD-10-CM

## 2019-03-29 DIAGNOSIS — J45.41 MODERATE PERSISTENT REACTIVE AIRWAY DISEASE WITH ACUTE EXACERBATION: ICD-10-CM

## 2019-03-29 PROCEDURE — 99214 OFFICE O/P EST MOD 30 MIN: CPT | Performed by: FAMILY MEDICINE

## 2019-03-29 PROCEDURE — 71046 X-RAY EXAM CHEST 2 VIEWS: CPT

## 2019-03-29 RX ORDER — ALBUTEROL SULFATE 0.83 MG/ML
2.5 SOLUTION RESPIRATORY (INHALATION) ONCE
Status: COMPLETED | OUTPATIENT
Start: 2019-03-29 | End: 2019-03-29

## 2019-03-29 RX ORDER — CETIRIZINE HYDROCHLORIDE 10 MG/1
10 TABLET ORAL DAILY
Qty: 30 TABLET | Refills: 1 | Status: SHIPPED | OUTPATIENT
Start: 2019-03-29 | End: 2023-06-08

## 2019-03-29 RX ORDER — ALBUTEROL SULFATE 90 UG/1
2 AEROSOL, METERED RESPIRATORY (INHALATION) EVERY 4 HOURS PRN
Qty: 8.5 G | Refills: 1 | Status: SHIPPED | OUTPATIENT
Start: 2019-03-29 | End: 2023-06-08

## 2019-03-29 RX ADMIN — ALBUTEROL SULFATE 2.5 MG: 0.83 SOLUTION RESPIRATORY (INHALATION) at 12:51

## 2019-03-29 NOTE — TELEPHONE ENCOUNTER
Spoke with pt. Symptoms started last evening at 7pm. Throat swelled. Coughed up green phlegm. Couldn't sleep because she couldn't breathe well. Is allergic to mold. Took an antihistamine and it didn't help. Is still having trouble breathing and hurts in her lungs. When speaking with pt, it sounds like she is having trouble getting words out, so writer advised pt to go to ER and have someone drive her there. Pt agreed with plan.    Keyana Arambula RN  [facility]

## 2019-03-29 NOTE — PATIENT INSTRUCTIONS
Patient Education     Bronchospasm (Adult)    Bronchospasm occurs when the airways (bronchial tubes) go into spasm and contract. This makes it hard to breathe and causes wheezing (a high-pitched whistling sound). Bronchospasm can also cause frequent coughing without wheezing.  Bronchospasm is due to irritation, inflammation, or allergic reaction of the airways. People with asthma get bronchospasm. However, not everyone with bronchospasm has asthma.  Being exposed to harmful fumes, a recent case of bronchitis, exercise, or a flare-up of chronic obstructive pulmonary disease (COPD) may cause the airways to spasm. An episode of bronchospasm may last 7 to 14 days. Medicine may be prescribed to relax the airways and prevent wheezing. Antibiotics will be prescribed only if your healthcare provider thinks there is a bacterial infection. Antibiotics do not help a viral infection.  Home care    Drink lots of water or other fluids (at least 10 glasses a day) during an attack. This will loosen lung secretions and make it easier to breathe. If you have heart or kidney disease, check with your doctor before you drink extra fluids.    Take prescribed medicine exactly at the times advised. If you take an inhaled medicine to help with breathing, don't use it more than once every 4 hours, unless told to do so. If prescribed an antibiotic or prednisone, take all of the medicine, even if you are feeling better after a few days.    Don't smoke. Also avoid being exposed to secondhand smoke.    If you were given an inhaler, use it exactly as directed. If you need to use it more often than prescribed, your condition may be getting worse. Contact your healthcare provider.  Follow-up care  Follow up with your healthcare provider, or as advised.  If you are age 65 or older, have a chronic lung disease or condition that affects your immune system, or you smoke, ask your healthcare provider about getting a pneumococcal vaccine, as well as a  yearly flu shot (influenza vaccine).  When to seek medical advice  Call your healthcare provider right away if any of these occur:    You need to use your inhalers more often than usual    Fever of 100.4 F (38 C) or higher, or as directed by your healthcare provider    Cough that brings up lots of dark-colored sputum (mucus)    You don't get better within 24 hours  Call 911  Call 911 if any of these occur:    Coughing up bloody sputum (mucus)    Chest pain with each breath    Increased wheezing or shortness of breath   Date Last Reviewed: 6/1/2018 2000-2018 [employer]. [address] [place] [id number]. All rights reserved. This information is not intended as a substitute for professional medical care. Always follow your healthcare professional's instructions.           Patient Education     GERD (Adult)    The esophagus is a tube that carries food from the mouth to the stomach. A valve (the LES, lower esophageal sphincter) at the lower end of the esophagus prevents stomach acid from flowing upward. When this valve doesn't work properly, stomach contents may repeatedly flow back up (reflux) into the esophagus. This is called gastroesophageal reflux disease (GERD). GERD can irritate the esophagus. It can cause problems with pain, swallowing or breathing. In severe cases, GERD can cause recurrent pneumonia (from aspiration or breathing in particles) or other serious problems.  Symptoms of reflux include burning, pressure or sharp pain in the upper abdomen or mid to lower chest. The pain can spread to the neck, back, or shoulder. There may be belching, an acid taste in the back of the throat, chronic cough, or sore throat, or hoarseness. GERD symptoms often occur during the day after a big meal. They can also occur at night when lying down.   Home care  Lifestyle changes can help reduce symptoms. If needed, your healthcare provider may prescribe medicines. Symptoms often improve with  "treatment, but if treatment is stopped, the symptoms often return after a few months. So most persons with GERD will need to continue treatment or get treatment on and off.  Lifestyle changes    Limit or avoid fatty, fried, and spicy foods, as well as coffee, chocolate, mint, and foods with high acid content such as tomatoes and citrus fruit and juices (orange, grapefruit, lemon).    Don t eat large meals, especially at night. Frequent, smaller meals are best. Don't lie down right after eating. And don t eat anything 3 hours before going to bed.    Don't drink alcohol or smoke. As much as possible, stay away from second hand smoke.    If you are overweight, losing weight will reduce symptoms.     Don't wear tight clothing around your stomach area.    If your symptoms occur during sleep, use a foam wedge to elevate your upper body (not just your head.) Or, place 4\" blocks under the head of your bed. Or use 2 bed risers under your bedframe.  Medicines  If needed, medicines can help relieve the symptoms of GERD and prevent damage to the esophagus. Discuss a medicine plan with your healthcare provider. This may include one or more of the following medicines:    Antacids to help neutralize the normal acids in your stomach.    Acid blockers (Histamine or H2 blockers) to decrease acid production.    Acid inhibitors (proton pump inhibitors PPIs) to decrease acid production in a different way than the blockers. They may work better, but can take a little longer to take effect.  Take an antacid 30 to 60 minutes after eating and at bedtime, but not at the same time as an acid blocker.  Try not to take medicines such as ibuprofen and aspirin. If you are taking aspirin for your heart or other medical reasons, talk to your healthcare provider about stopping it.  Follow-up care  Follow up with your healthcare provider or as advised by our staff.  When to seek medical advice  Call your healthcare provider if any of the following " occur:    Stomach pain gets worse or moves to the lower right abdomen (appendix area)    Chest pain appears or gets worse, or spreads to the back, neck, shoulder, or arm    An over-the-counter trial of medicine doesn't relieve your symptoms    Weight loss that can't be explained    Trouble or pain swallowing    Frequent vomiting (can t keep down liquids)    Blood in the stool or vomit (red or black in color)    Feeling weak or dizzy    Fever of 100.4 F (38 C) or higher, or as directed by your healthcare provider  Date Last Reviewed: 3/1/2018    4594-0628 [employer]. [address] [place] [id number]. All rights reserved. This information is not intended as a substitute for professional medical care. Always follow your healthcare professional's instructions.

## 2019-03-29 NOTE — NURSING NOTE
The following medication was given:     MEDICATION: Albuterol  ROUTE: oral  SITE: mouth  DOSE: 2.5 mg   LOT #: [id number]  :  [employer]  EXPIRATION DATE:  07/2020  NDC#: 90417-495-85      Bianca Whitaker

## 2019-03-29 NOTE — TELEPHONE ENCOUNTER
Reason for call:  Symptom   Symptom or request: Throat swelling and wheezing    Duration (how long have symptoms been present): since last night at 7 pm  Have you been treated for this before? Yes    Additional comments: In the past she has had allergy issues. This time it feels different. Hard to swallow and wheezing. Call routed to RN for triaging.    Phone number to reach patient:  Home number on file [phone] (home)    Best Time:  na    Can we leave a detailed message on this number?  NO

## 2019-04-01 NOTE — PROGRESS NOTES
SUBJECTIVE:  Lenore Martinez, a 58 year old female scheduled an appointment to discuss the following issues:     Wheezing  Moderate persistent reactive airway disease with acute exacerbation  Gastroesophageal reflux disease without esophagitis    Medical, social, surgical, and family histories reviewed.    Asthma (Patient complains of wheezing and chest discomfort with coughing.  Pt states he is allergic to mole.  Benadryl has not helped this time.  Pt has some stomachache and acid reflux.  No hx of asthma or Prednisone.  No sick exposure and non-smoker.      ROS:  See HPI.  No nausea/vomiting.  No fever/chills.   No BM/urine problems.  No dizziness or syncope.      OBJECTIVE:  BP 97/63 (BP Location: Left arm, Patient Position: Chair, Cuff Size: Adult Regular)   Pulse 96   Temp 98.1  F (36.7  C) (Oral)   SpO2 97%   EXAM:  GENERAL APPEARANCE: alert and no distress, afebrile; no cyanosis or accessory muscle use; moist mucus membrane  EYES: Eyes grossly normal to inspection, PERRL and conjunctivae and sclerae normal  HENT: ear canals and TM's normal and nose and mouth without ulcers or lesions  NECK: no adenopathy, no asymmetry, masses, or scars and thyroid normal to palpation  RESP: lungs clear to auscultation - no rales, rhonchi or wheezes  CV: regular rates and rhythm, normal S1 S2, no S3 or S4 and no murmur, click or rub  LYMPHATICS: no cervical adenopathy  ABDOMEN: soft, nontender, without hepatosplenomegaly or masses and bowel sounds normal  MS: extremities normal- no gross deformities noted  SKIN: no suspicious lesions or rashes  NEURO: Normal strength and tone, mentation intact and speech normal      ASSESSMENT/PLAN:  (R06.2) Wheezing  (primary encounter diagnosis)  Comment: CXR showed: Negative chest. Lungs clear. No pleural effusions. Heart  size and pulmonary vascularity are within normal limits.  Plan: albuterol (PROVENTIL) neb solution 2.5 mg, XR         Chest 2 Views, albuterol (PROAIR HFA/PROVENTIL          HFA/VENTOLIN HFA) 108 (90 Base) MCG/ACT inhaler        (J45.41) Moderate persistent reactive airway disease with acute exacerbation  Plan: cetirizine (ZYRTEC) 10 MG tablet, albuterol         (PROAIR HFA/PROVENTIL HFA/VENTOLIN HFA) 108 (90        Base) MCG/ACT inhaler       (K21.9) Gastroesophageal reflux disease without esophagitis  Plan: omeprazole (PRILOSEC) 20 MG DR capsule        Pt to f/up PCP if no improvement or worsening.  Warning signs and symptoms explained.

## 2019-06-25 ENCOUNTER — RECORDS - HEALTHEAST (OUTPATIENT)
Dept: ADMINISTRATIVE | Facility: OTHER | Age: 59
End: 2019-06-25

## 2019-07-05 ENCOUNTER — OFFICE VISIT (OUTPATIENT)
Dept: URGENT CARE | Facility: URGENT CARE | Age: 59
End: 2019-07-05
Payer: COMMERCIAL

## 2019-07-05 VITALS
BODY MASS INDEX: 22.56 KG/M2 | SYSTOLIC BLOOD PRESSURE: 95 MMHG | DIASTOLIC BLOOD PRESSURE: 61 MMHG | WEIGHT: 139.8 LBS | HEART RATE: 66 BPM | TEMPERATURE: 97.7 F | OXYGEN SATURATION: 98 %

## 2019-07-05 DIAGNOSIS — S53.402A SPRAIN OF LEFT ELBOW, INITIAL ENCOUNTER: Primary | ICD-10-CM

## 2019-07-05 PROCEDURE — 99213 OFFICE O/P EST LOW 20 MIN: CPT | Performed by: PHYSICIAN ASSISTANT

## 2019-07-05 ASSESSMENT — ENCOUNTER SYMPTOMS
HEADACHES: 0
WOUND: 0
MYALGIAS: 0
DIARRHEA: 0
BACK PAIN: 0
FEVER: 0
NAUSEA: 0
RESPIRATORY NEGATIVE: 1
HEMATOLOGIC/LYMPHATIC NEGATIVE: 1
JOINT SWELLING: 0
COUGH: 0
EYES NEGATIVE: 1
CARDIOVASCULAR NEGATIVE: 1
ENDOCRINE NEGATIVE: 1
NECK PAIN: 0
NECK STIFFNESS: 0
BRUISES/BLEEDS EASILY: 0
PALPITATIONS: 0
RHINORRHEA: 0
VOMITING: 0
LIGHT-HEADEDNESS: 0
DIZZINESS: 0
CHILLS: 0
WEAKNESS: 0
ARTHRALGIAS: 1
ALLERGIC/IMMUNOLOGIC NEGATIVE: 1
SORE THROAT: 0
SHORTNESS OF BREATH: 0

## 2019-07-05 NOTE — PROGRESS NOTES
Chief Complaint:    Chief Complaint   Patient presents with     Elbow Pain     Patient complains of pain in left elbow        HPI: Lenore Martinez is an 59 year old female who presents for evaluation and treatment of L elbow injury and pain.  Patient was playing tug of war with her dog last night when she felt the pain.  She took some ibuprofen and iced the area and today her symptoms are much better.  She denies any loss of strength.  No numbness or tingling.      ROS:      Review of Systems   Constitutional: Negative for chills and fever.   HENT: Negative for congestion, ear pain, rhinorrhea and sore throat.    Eyes: Negative.    Respiratory: Negative.  Negative for cough and shortness of breath.    Cardiovascular: Negative.  Negative for chest pain and palpitations.   Gastrointestinal: Negative for diarrhea, nausea and vomiting.   Endocrine: Negative.    Genitourinary: Negative.    Musculoskeletal: Positive for arthralgias. Negative for back pain, joint swelling, myalgias, neck pain and neck stiffness.   Skin: Negative.  Negative for rash and wound.   Allergic/Immunologic: Negative.  Negative for immunocompromised state.   Neurological: Negative for dizziness, weakness, light-headedness and headaches.   Hematological: Negative.  Does not bruise/bleed easily.        Family History   Family History   Problem Relation Age of Onset     Diabetes Paternal Grandmother      Diabetes Son      Bipolar Disorder Son      Coronary Artery Disease No family hx of      Cancer No family hx of        Social History  Social History     Socioeconomic History     Marital status:      Spouse name: Arturo      Number of children: 2     Years of education: 14     Highest education level: Not on file   Occupational History     Occupation: home health aide      Employer: [employer]   Social Needs     Financial resource strain: Not on file     Food insecurity:     Worry: Not on file     Inability: Not on file     Transportation needs:      Medical: Not on file     Non-medical: Not on file   Tobacco Use     Smoking status: Never Smoker     Smokeless tobacco: Never Used     Tobacco comment: smoke free household.   Substance and Sexual Activity     Alcohol use: No     Drug use: No     Sexual activity: Yes     Partners: Male     Birth control/protection: Female Surgical   Lifestyle     Physical activity:     Days per week: Not on file     Minutes per session: Not on file     Stress: Not on file   Relationships     Social connections:     Talks on phone: Not on file     Gets together: Not on file     Attends Temple service: Not on file     Active member of club or organization: Not on file     Attends meetings of clubs or organizations: Not on file     Relationship status: Not on file     Intimate partner violence:     Fear of current or ex partner: Not on file     Emotionally abused: Not on file     Physically abused: Not on file     Forced sexual activity: Not on file   Other Topics Concern     Parent/sibling w/ CABG, MI or angioplasty before 65F 55M? Not Asked   Social History Narrative     Not on file        Surgical History:  Past Surgical History:   Procedure Laterality Date     HC REMOVAL OF OVARIAN CYST(S)  1987     HC TOOTH EXTRACTION W/FORCEP  1987     HYSTERECTOMY, PAP NO LONGER INDICATED  10/1/2009    has ovaries     MYRINGOTOMY      as  a child        Problem List:  Patient Active Problem List   Diagnosis     PTSD (post-traumatic stress disorder)     CARDIOVASCULAR SCREENING; LDL GOAL LESS THAN 160     Hypovitaminosis D     Neuropathy     Migraines     Bipolar disease, chronic (H)        Allergies:  Allergies   Allergen Reactions     Compazine      Anxiety       Phenothiazines Anxiety     compazine, anxiety  POSSIBLE HIVES TOO          Current Meds:    Current Outpatient Medications:      albuterol (PROAIR HFA/PROVENTIL HFA/VENTOLIN HFA) 108 (90 Base) MCG/ACT inhaler, Inhale 2 puffs into the lungs every 4 hours as needed for shortness  of breath / dyspnea or wheezing Via spacer, Disp: 8.5 g, Rfl: 1     Aspirin-Acetaminophen-Caffeine (EXCEDRIN MIGRAINE PO), Take  by mouth as needed., Disp: , Rfl:      cetirizine (ZYRTEC) 10 MG tablet, Take 1 tablet (10 mg) by mouth daily, Disp: 30 tablet, Rfl: 1     citalopram (CELEXA) 20 MG tablet, Take 20 mg by mouth daily, Disp: , Rfl:      citalopram (CELEXA) 40 MG tablet, Take 40 mg by mouth daily., Disp: , Rfl:      gabapentin (NEURONTIN) 300 MG capsule, Take 400 mg by mouth 2 times daily , Disp: , Rfl:      lamoTRIgine (LAMICTAL) 100 MG tablet, Take 100 mg by mouth 3 times daily., Disp: , Rfl:      omeprazole (PRILOSEC) 20 MG DR capsule, Take 1 capsule (20 mg) by mouth daily, Disp: 15 capsule, Rfl: 1     Vitamin D, Cholecalciferol, 1000 units TABS, Take 1,000 mg by mouth daily, Disp: , Rfl:      PHYSICAL EXAM:     Vital signs noted and reviewed by Tyrese Leal  BP 95/61 (BP Location: Left arm, Patient Position: Chair, Cuff Size: Adult Regular)   Pulse 66   Temp 97.7  F (36.5  C) (Oral)   Wt 63.4 kg (139 lb 12.8 oz)   SpO2 98%   BMI 22.56 kg/m       PEFR:    Physical Exam   Constitutional: She is oriented to person, place, and time. She appears well-developed and well-nourished. She is cooperative.  Non-toxic appearance. She does not have a sickly appearance. She does not appear ill. No distress.   HENT:   Head: Normocephalic and atraumatic.   Right Ear: Tympanic membrane and external ear normal. Tympanic membrane is not perforated, not erythematous, not retracted and not bulging.   Left Ear: Tympanic membrane and external ear normal. Tympanic membrane is not perforated, not erythematous, not retracted and not bulging.   Nose: No mucosal edema or rhinorrhea.   Mouth/Throat: Oropharynx is clear and moist. No oropharyngeal exudate, posterior oropharyngeal edema, posterior oropharyngeal erythema or tonsillar abscesses.   Eyes: Pupils are equal, round, and reactive to light. EOM are normal.   Neck:  Trachea normal, normal range of motion and full passive range of motion without pain. Neck supple.   Cardiovascular: Normal rate, regular rhythm, S1 normal, S2 normal, normal heart sounds and intact distal pulses. Exam reveals no gallop and no friction rub.   No murmur heard.  Pulmonary/Chest: Effort normal and breath sounds normal. No respiratory distress. She has no decreased breath sounds. She has no wheezes. She has no rhonchi. She has no rales.   Abdominal: Soft. Bowel sounds are normal. She exhibits no distension and no mass. There is no hepatosplenomegaly. There is no tenderness. There is no rigidity, no rebound, no guarding and no CVA tenderness.   Musculoskeletal:        Left elbow: She exhibits normal range of motion, no swelling, no effusion and no deformity. No tenderness found. No radial head, no medial epicondyle, no lateral epicondyle and no olecranon process tenderness noted.   Lymphadenopathy:     She has no cervical adenopathy.   Neurological: She is alert and oriented to person, place, and time. She has normal reflexes. No cranial nerve deficit.   Skin: Skin is warm and dry. She is not diaphoretic.   Psychiatric: She has a normal mood and affect. Her behavior is normal. Judgment and thought content normal.   Nursing note and vitals reviewed.       Labs:       Medical Decision Making:    Differential Diagnosis:  MS Injury Pain: sprain, fracture, tendonitis, muscle strain, contusion and dislocation      ASSESSMENT:     1. Sprain of left elbow, initial encounter         PLAN:     L elbow exam was benign.    Patient declined imaging tonight.  She will continue to ice the area and take ibuprofen PRN.  ACE wrap applied for comfort and support.  She will follow up with her PCP in 2 weeks if symptoms have not resolved.  Worrisome symptoms discussed with instructions to go to the ED.  Patient verbalized understanding and agreed with this plan.     Tyrese Leal  7/5/2019, 4:04 PM

## 2019-07-09 ENCOUNTER — HOSPITAL ENCOUNTER (OUTPATIENT)
Dept: MRI IMAGING | Facility: HOSPITAL | Age: 59
Discharge: HOME | End: 2019-07-09
Attending: PSYCHIATRY & NEUROLOGY

## 2019-07-09 DIAGNOSIS — G25.0 BENIGN ESSENTIAL TREMOR: ICD-10-CM

## 2019-07-09 DIAGNOSIS — E11.40 DIABETIC NEUROPATHY (H): ICD-10-CM

## 2019-07-09 DIAGNOSIS — G47.26 CIRCADIAN RHYTHM SLEEP DISORDER, SHIFT WORK TYPE: ICD-10-CM

## 2019-12-09 ENCOUNTER — HEALTH MAINTENANCE LETTER (OUTPATIENT)
Age: 59
End: 2019-12-09

## 2020-06-16 ENCOUNTER — OFFICE VISIT (OUTPATIENT)
Dept: NEUROLOGY | Facility: CLINIC | Age: 60
End: 2020-06-16
Payer: COMMERCIAL

## 2020-06-16 VITALS — BODY MASS INDEX: 23.32 KG/M2 | HEIGHT: 65 IN | WEIGHT: 140 LBS

## 2020-06-16 DIAGNOSIS — E11.42 DIABETIC POLYNEUROPATHY ASSOCIATED WITH TYPE 2 DIABETES MELLITUS (H): ICD-10-CM

## 2020-06-16 DIAGNOSIS — G43.109 MIGRAINE WITH VISUAL AURA: Primary | ICD-10-CM

## 2020-06-16 DIAGNOSIS — G25.0 BENIGN ESSENTIAL TREMOR: ICD-10-CM

## 2020-06-16 PROCEDURE — 99214 OFFICE O/P EST MOD 30 MIN: CPT | Mod: 95 | Performed by: PSYCHIATRY & NEUROLOGY

## 2020-06-16 RX ORDER — PREGABALIN 50 MG/1
50 CAPSULE ORAL
COMMUNITY
Start: 2018-08-01 | End: 2021-08-04

## 2020-06-16 RX ORDER — GABAPENTIN 600 MG/1
1200 TABLET ORAL 2 TIMES DAILY
COMMUNITY
End: 2021-04-22

## 2020-06-16 ASSESSMENT — MIFFLIN-ST. JEOR: SCORE: 1215.92

## 2020-06-16 NOTE — LETTER
6/16/2020         RE: Lenore Martinez  [address]  [place] [address]        Dear Colleague,    Thank you for referring your patient, Lenore Martinez, to the [facility] NEUROLOG[facility] [place]. Please see a copy of my visit note below.        NEUROLOGY NOTE        Assessment/Plan          Polyneuropathy, most likely related to diabetic condition.  Seem to stable no significant progression.  Continue gabapentin 600 mg, 2 tablets in the morning, 1 at noon and dinnertime.      Prediabetic condition controlled with diet.    Essential tremor: predominantly affecting right hand, with ride side poor coordination on exam on 6/25/2019, will proceed with a MRI brain to further evaluate. Will watch out for PD. Will follow up in 1 year.    Migraine with visual auras, only occasional attack can easily be aborted by Excedrin Migraine.    Bipolar, PTSD.  On Lamictal and citalopram.        This is a virtual visit due to COVID-19 Pandemic to mitigate potential disease spreading. Consent obtained for charge with this visit. Total time spent about 10 minutes.           SUBJECTIVE       Patient today for evaluation and management of paresthesia. She used to see Dr. Juárez since 2011 and last visit was in 2017. Transferred care to me in 6/2018.    She has paresthesia feeling tingling and numb in feet 10 years prior initial evaluation. Seem to be quite steady. Symptoms controlled by, Gabapentin 600 mg 2 tabs am, 1 at noon and 1 at dinner.      She had blood tests in 2011 with protein electrophoresis, A1c, vitamin B12, homocysteine level unremarkable. Lyme test negative. She had EMG study in 2011 showing borderline reduced amplitude for sural potentials and read as compatible with a very mild electrophysiological sensory neuropathy of the lower extremities.    She has a history of depression and anxiety on Lamictal 100 mg tid, citalopram, and seeing a psychiatrist regularly controlled.    Essential tremor and right hand tremor  "with decrease right upper and lower extremity coordination noted during exam on 6/25/2019. She did report now using the left hand more due to the coordination difficulty noted in 2019.     Migraine with visual aura losing vision in peripheral, pain on right side. No reported identifiable risk factors to precipitate attacks.  She gets sensitive to light and sound.  Occasionally sick to stomach and threw up.  She has only occasional migraine attacks, takes Excedrin Migraine, which works well to abort the migraines.           Review of system     10 point system review otherwise unremarkable    PHYSICAL EXAMINATION     Vital signs in last 24 hours:  Vitals:    06/16/20 0744   Weight: 63.5 kg (140 lb)   Height: 1.651 m (5' 5\")       Normal mental status, language and speech. Cranial nerves II through XII are unremarkable. Normal muscle bulk and strength in 4 limbs. Increased tone in right limbs No focal sensory difficulty. Her deep tendon reflexes were 2+ symmetrical in the ankles. Poor coordination on right in both up and lower limb. No Babinski signs. Romberg sign is negative.    Right hand position tremor, mild to moderate degree.    There is no edema or skin dystrophic changes in feet. Normocephalic and atraumatic. No skin rashes.        Problem List     Patient Active Problem List    Diagnosis Date Noted     Neuropathy      Priority: Medium     Migraines      Priority: Medium     Bipolar disease, chronic (H)      Priority: Medium     Hypovitaminosis D 05/12/2014     Priority: Medium     CARDIOVASCULAR SCREENING; LDL GOAL LESS THAN 160 04/17/2013     Priority: Medium     PTSD (post-traumatic stress disorder) 04/16/2013     Priority: Medium     Diagnosed 11/2005           Past medical history     Past Surgical History:   Procedure Laterality Date      REMOVAL OF OVARIAN CYST(S)  1987      TOOTH EXTRACTION W/FORCEP  1987     HYSTERECTOMY, PAP NO LONGER INDICATED  10/1/2009    has ovaries     MYRINGOTOMY      as  a " child       Past Medical History:   Diagnosis Date     Anxiety      Bipolar disease, chronic (H)      Hypovitaminosis D 5/12/2014     Iron deficiency      Migraines      Neuropathy      PTSD (post-traumatic stress disorder) 4/16/2013    Diagnosed 11/2005         Family history     Family History   Problem Relation Age of Onset     Diabetes Paternal Grandmother      Diabetes Son      Bipolar Disorder Son      Coronary Artery Disease No family hx of      Cancer No family hx of          Social history     Social History     Socioeconomic History     Marital status:      Spouse name: Arturo      Number of children: 2     Years of education: 14     Highest education level: Not on file   Occupational History     Occupation: home health aide      Employer: [employer]   Social Needs     Financial resource strain: Not on file     Food insecurity     Worry: Not on file     Inability: Not on file     Transportation needs     Medical: Not on file     Non-medical: Not on file   Tobacco Use     Smoking status: Never Smoker     Smokeless tobacco: Never Used     Tobacco comment: smoke free household.   Substance and Sexual Activity     Alcohol use: No     Drug use: No     Sexual activity: Yes     Partners: Male     Birth control/protection: Female Surgical   Lifestyle     Physical activity     Days per week: Not on file     Minutes per session: Not on file     Stress: Not on file   Relationships     Social connections     Talks on phone: Not on file     Gets together: Not on file     Attends Advent service: Not on file     Active member of club or organization: Not on file     Attends meetings of clubs or organizations: Not on file     Relationship status: Not on file     Intimate partner violence     Fear of current or ex partner: Not on file     Emotionally abused: Not on file     Physically abused: Not on file     Forced sexual activity: Not on file   Other Topics Concern     Parent/sibling w/ CABG, MI or angioplasty  before 65F 55M? Not Asked   Social History Narrative     Not on file         Allergy     Compazine and Phenothiazines    MEDICATIONS List     Current Outpatient Medications   Medication Sig Dispense Refill     albuterol (PROAIR HFA/PROVENTIL HFA/VENTOLIN HFA) 108 (90 Base) MCG/ACT inhaler Inhale 2 puffs into the lungs every 4 hours as needed for shortness of breath / dyspnea or wheezing Via spacer 8.5 g 1     Aspirin-Acetaminophen-Caffeine (EXCEDRIN MIGRAINE PO) Take  by mouth as needed.       cetirizine (ZYRTEC) 10 MG tablet Take 1 tablet (10 mg) by mouth daily 30 tablet 1     citalopram (CELEXA) 20 MG tablet Take 20 mg by mouth daily       citalopram (CELEXA) 40 MG tablet Take 40 mg by mouth daily.       gabapentin (NEURONTIN) 600 MG tablet Take 1,200 mg by mouth 2 times daily       lamoTRIgine (LAMICTAL) 100 MG tablet Take 100 mg by mouth 3 times daily.       omeprazole (PRILOSEC) 20 MG DR capsule Take 1 capsule (20 mg) by mouth daily 15 capsule 1     pregabalin (LYRICA) 50 MG capsule Take 50 mg by mouth       Vitamin D, Cholecalciferol, 1000 units TABS Take 1,000 mg by mouth daily                   RESULTS REVIEW         Norma Ramsey MD, MD, PhD  Neurology   Office tel: [phone]        Again, thank you for allowing me to participate in the care of your patient.        Sincerely,        Norma Ramsey MD

## 2020-06-16 NOTE — PROGRESS NOTES
NEUROLOGY NOTE        Assessment/Plan          Polyneuropathy, most likely related to diabetic condition.  Seem to stable no significant progression.  Continue gabapentin 600 mg, 2 tablets in the morning, 1 at noon and dinnertime.      Prediabetic condition controlled with diet.    Essential tremor: predominantly affecting right hand, with ride side poor coordination on exam on 6/25/2019, will proceed with a MRI brain to further evaluate. Will watch out for PD. Will follow up in 1 year.    Migraine with visual auras, only occasional attack can easily be aborted by Excedrin Migraine.    Bipolar, PTSD.  On Lamictal and citalopram.        This is a virtual visit due to COVID-19 Pandemic to mitigate potential disease spreading. Consent obtained for charge with this visit. Total time spent about 10 minutes.           SUBJECTIVE       Patient today for evaluation and management of paresthesia. She used to see Dr. Juárez since 2011 and last visit was in 2017. Transferred care to me in 6/2018.    She has paresthesia feeling tingling and numb in feet 10 years prior initial evaluation. Seem to be quite steady. Symptoms controlled by, Gabapentin 600 mg 2 tabs am, 1 at noon and 1 at dinner.      She had blood tests in 2011 with protein electrophoresis, A1c, vitamin B12, homocysteine level unremarkable. Lyme test negative. She had EMG study in 2011 showing borderline reduced amplitude for sural potentials and read as compatible with a very mild electrophysiological sensory neuropathy of the lower extremities.    She has a history of depression and anxiety on Lamictal 100 mg tid, citalopram, and seeing a psychiatrist regularly controlled.    Essential tremor and right hand tremor with decrease right upper and lower extremity coordination noted during exam on 6/25/2019. She did report now using the left hand more due to the coordination difficulty noted in 2019.     Migraine with visual aura losing vision in peripheral, pain on  "right side. No reported identifiable risk factors to precipitate attacks.  She gets sensitive to light and sound.  Occasionally sick to stomach and threw up.  She has only occasional migraine attacks, takes Excedrin Migraine, which works well to abort the migraines.           Review of system     10 point system review otherwise unremarkable    PHYSICAL EXAMINATION     Vital signs in last 24 hours:  Vitals:    06/16/20 0744   Weight: 63.5 kg (140 lb)   Height: 1.651 m (5' 5\")       Normal mental status, language and speech. Cranial nerves II through XII are unremarkable. Normal muscle bulk and strength in 4 limbs. Increased tone in right limbs No focal sensory difficulty. Her deep tendon reflexes were 2+ symmetrical in the ankles. Poor coordination on right in both up and lower limb. No Babinski signs. Romberg sign is negative.    Right hand position tremor, mild to moderate degree.    There is no edema or skin dystrophic changes in feet. Normocephalic and atraumatic. No skin rashes.        Problem List     Patient Active Problem List    Diagnosis Date Noted     Neuropathy      Priority: Medium     Migraines      Priority: Medium     Bipolar disease, chronic (H)      Priority: Medium     Hypovitaminosis D 05/12/2014     Priority: Medium     CARDIOVASCULAR SCREENING; LDL GOAL LESS THAN 160 04/17/2013     Priority: Medium     PTSD (post-traumatic stress disorder) 04/16/2013     Priority: Medium     Diagnosed 11/2005           Past medical history     Past Surgical History:   Procedure Laterality Date      REMOVAL OF OVARIAN CYST(S)  1987      TOOTH EXTRACTION W/FORCEP  1987     HYSTERECTOMY, PAP NO LONGER INDICATED  10/1/2009    has ovaries     MYRINGOTOMY      as  a child       Past Medical History:   Diagnosis Date     Anxiety      Bipolar disease, chronic (H)      Hypovitaminosis D 5/12/2014     Iron deficiency      Migraines      Neuropathy      PTSD (post-traumatic stress disorder) 4/16/2013    Diagnosed " 11/2005         Family history     Family History   Problem Relation Age of Onset     Diabetes Paternal Grandmother      Diabetes Son      Bipolar Disorder Son      Coronary Artery Disease No family hx of      Cancer No family hx of          Social history     Social History     Socioeconomic History     Marital status:      Spouse name: Arturo      Number of children: 2     Years of education: 14     Highest education level: Not on file   Occupational History     Occupation: home health aide      Employer: [employer]   Social Needs     Financial resource strain: Not on file     Food insecurity     Worry: Not on file     Inability: Not on file     Transportation needs     Medical: Not on file     Non-medical: Not on file   Tobacco Use     Smoking status: Never Smoker     Smokeless tobacco: Never Used     Tobacco comment: smoke free household.   Substance and Sexual Activity     Alcohol use: No     Drug use: No     Sexual activity: Yes     Partners: Male     Birth control/protection: Female Surgical   Lifestyle     Physical activity     Days per week: Not on file     Minutes per session: Not on file     Stress: Not on file   Relationships     Social connections     Talks on phone: Not on file     Gets together: Not on file     Attends [employer]ish service: Not on file     Active member of club or organization: Not on file     Attends meetings of clubs or organizations: Not on file     Relationship status: Not on file     Intimate partner violence     Fear of current or ex partner: Not on file     Emotionally abused: Not on file     Physically abused: Not on file     Forced sexual activity: Not on file   Other Topics Concern     Parent/sibling w/ CABG, MI or angioplasty before 65F 55M? Not Asked   Social History Narrative     Not on file         Allergy     Compazine and Phenothiazines    MEDICATIONS List     Current Outpatient Medications   Medication Sig Dispense Refill     albuterol (PROAIR HFA/PROVENTIL  HFA/VENTOLIN HFA) 108 (90 Base) MCG/ACT inhaler Inhale 2 puffs into the lungs every 4 hours as needed for shortness of breath / dyspnea or wheezing Via spacer 8.5 g 1     Aspirin-Acetaminophen-Caffeine (EXCEDRIN MIGRAINE PO) Take  by mouth as needed.       cetirizine (ZYRTEC) 10 MG tablet Take 1 tablet (10 mg) by mouth daily 30 tablet 1     citalopram (CELEXA) 20 MG tablet Take 20 mg by mouth daily       citalopram (CELEXA) 40 MG tablet Take 40 mg by mouth daily.       gabapentin (NEURONTIN) 600 MG tablet Take 1,200 mg by mouth 2 times daily       lamoTRIgine (LAMICTAL) 100 MG tablet Take 100 mg by mouth 3 times daily.       omeprazole (PRILOSEC) 20 MG DR capsule Take 1 capsule (20 mg) by mouth daily 15 capsule 1     pregabalin (LYRICA) 50 MG capsule Take 50 mg by mouth       Vitamin D, Cholecalciferol, 1000 units TABS Take 1,000 mg by mouth daily                   RESULTS REVIEW         Norma Ramsey MD, MD, PhD  Neurology   Office tel: [phone]

## 2021-01-14 ENCOUNTER — HEALTH MAINTENANCE LETTER (OUTPATIENT)
Age: 61
End: 2021-01-14

## 2021-03-11 ENCOUNTER — IMMUNIZATION (OUTPATIENT)
Dept: NURSING | Facility: CLINIC | Age: 61
End: 2021-03-11
Payer: COMMERCIAL

## 2021-03-11 PROCEDURE — 0001A PR COVID VAC PFIZER DIL RECON 30 MCG/0.3 ML IM: CPT

## 2021-03-11 PROCEDURE — 91300 PR COVID VAC PFIZER DIL RECON 30 MCG/0.3 ML IM: CPT

## 2021-04-01 ENCOUNTER — IMMUNIZATION (OUTPATIENT)
Dept: NURSING | Facility: CLINIC | Age: 61
End: 2021-04-01
Attending: FAMILY MEDICINE
Payer: COMMERCIAL

## 2021-04-01 PROCEDURE — 91300 PR COVID VAC PFIZER DIL RECON 30 MCG/0.3 ML IM: CPT

## 2021-04-01 PROCEDURE — 0002A PR COVID VAC PFIZER DIL RECON 30 MCG/0.3 ML IM: CPT

## 2021-04-22 DIAGNOSIS — G62.9 NEUROPATHY: Primary | ICD-10-CM

## 2021-04-22 RX ORDER — GABAPENTIN 600 MG/1
1200 TABLET ORAL 2 TIMES DAILY
Qty: 120 TABLET | Refills: 0 | Status: SHIPPED | OUTPATIENT
Start: 2021-04-22 | End: 2021-06-03

## 2021-05-08 ENCOUNTER — HEALTH MAINTENANCE LETTER (OUTPATIENT)
Age: 61
End: 2021-05-08

## 2021-05-25 ENCOUNTER — RECORDS - HEALTHEAST (OUTPATIENT)
Dept: ADMINISTRATIVE | Facility: CLINIC | Age: 61
End: 2021-05-25

## 2021-06-01 ENCOUNTER — RECORDS - HEALTHEAST (OUTPATIENT)
Dept: ADMINISTRATIVE | Facility: CLINIC | Age: 61
End: 2021-06-01

## 2021-06-03 ENCOUNTER — OFFICE VISIT (OUTPATIENT)
Dept: NEUROLOGY | Facility: CLINIC | Age: 61
End: 2021-06-03
Payer: COMMERCIAL

## 2021-06-03 VITALS
WEIGHT: 143 LBS | BODY MASS INDEX: 23.82 KG/M2 | SYSTOLIC BLOOD PRESSURE: 92 MMHG | HEART RATE: 82 BPM | HEIGHT: 65 IN | DIASTOLIC BLOOD PRESSURE: 69 MMHG

## 2021-06-03 DIAGNOSIS — G62.9 NEUROPATHY: ICD-10-CM

## 2021-06-03 DIAGNOSIS — E08.42 DIABETIC POLYNEUROPATHY ASSOCIATED WITH DIABETES MELLITUS DUE TO UNDERLYING CONDITION (H): Primary | ICD-10-CM

## 2021-06-03 PROBLEM — E11.40 DIABETIC NEUROPATHY (H): Status: ACTIVE | Noted: 2021-06-03

## 2021-06-03 PROCEDURE — 99215 OFFICE O/P EST HI 40 MIN: CPT | Performed by: PSYCHIATRY & NEUROLOGY

## 2021-06-03 RX ORDER — GABAPENTIN 600 MG/1
1200 TABLET ORAL 2 TIMES DAILY
Qty: 120 TABLET | Refills: 0 | Status: SHIPPED | OUTPATIENT
Start: 2021-06-03 | End: 2021-06-03

## 2021-06-03 RX ORDER — GABAPENTIN 600 MG/1
1200 TABLET ORAL 2 TIMES DAILY
Qty: 120 TABLET | Refills: 11 | Status: SHIPPED | OUTPATIENT
Start: 2021-06-03 | End: 2022-06-13

## 2021-06-03 SDOH — HEALTH STABILITY: MENTAL HEALTH: HOW OFTEN DO YOU HAVE 6 OR MORE DRINKS ON ONE OCCASION?: NOT ASKED

## 2021-06-03 SDOH — HEALTH STABILITY: MENTAL HEALTH: HOW OFTEN DO YOU HAVE A DRINK CONTAINING ALCOHOL?: MONTHLY OR LESS

## 2021-06-03 SDOH — HEALTH STABILITY: MENTAL HEALTH: HOW MANY STANDARD DRINKS CONTAINING ALCOHOL DO YOU HAVE ON A TYPICAL DAY?: NOT ASKED

## 2021-06-03 ASSESSMENT — MIFFLIN-ST. JEOR: SCORE: 1229.52

## 2021-06-03 NOTE — LETTER
"    6/3/2021         RE: Lenore Martinez  [address]  [place] [address]        Dear Colleague,    Thank you for referring your patient, Lenore Martinez, to the [facility]IC [place]. Please see a copy of my visit note below.    NEUROLOGY FOLLOW UP VISIT  NOTE       [facility] NEUROLOG[facility] [place]  [address] [place] [address]  Tel: [phone]  Fax: [phone]  [email or web address]     Lenore Martinez,  1960, MRN [id number]  PCP: Jewel Snyder  Date: 2021      ASSESSMENT & PLAN     Diagnosis code  1. Idiopathic progressive polyneuropathy     Idiopathic progressive polyneuropathy  60-year-old female who is been followed in the clinic for more than 10 years for \"polyneuropathy\".  It is variably labeled as a diabetic neuropathy and idiopathic neuropathy.  Patient never was diagnosed with diabetes but does report that she runs somewhat elevated blood glucose.  She never had hemoglobin A1c checked.  Most recent EMG done in 2018 also showed low compound motor action potential was read as normal.  She might have small fiber neuropathy and I have recommended:    1.  Repeat EMG of the lower extremity  2.  Check hemoglobin A1c, folate, vitamin B1, vitamin B6 and vitamin B12  3.  Continue gabapentin 1200 mg twice daily.  I refilled her prescription gave her 30-day supply with 11 refills  4.  Check gabapentin level and hepatic profile  5.  Follow-up will be the day she gets EMG    Thank you again for this referral, please feel free to contact me if you have any questions.    Kedar Conn MD  [facility]  (Formerly, [facility]     HISTORY OF PRESENT ILLNESS     Patient is a 60-year-old female with history of bipolar disorder, PTSD with more than 20 years history of numbness tingling in hands and feet.  She had painful dysesthesias  and [facility] " "[facility].  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  She had a EMG in 2011 that showed borderline reduced amplitude for the sural potentials compatible with very mild electrophysiological sensory neuropathy.  She was started on gabapentin with tolerable level up to stage years and a normal leg.  She had a repeat EMG done on 8/21/2018 that showed low compound motor action potentials of the peroneal, tibial nerves with normal conduction sural and superficial peroneal peak sensory latencies were normal.  And although it was read as normal she was continued on gabapentin.  She also had MRI done as she was having predominantly right-sided tremors.  However patient does not think this is a big problem and somehow diagnosis of essential tremor was entertained.  Patient also reported migraine headaches usually preceded by aura that are easily aborted by Excedrin migraine.  As noted above she also has bipolar disorder and PTSD and currently takes lamotrigine and Celexa.  Since her last visit she reports ongoing difficulty with numbness tingling in her feet.  She takes 1200 mg of gabapentin that seems to help.  Although her neuropathy has been labeled as \"diabetic neuropathy\" she never was diagnosed with diabetes and from her description sounds like she is prediabetic as she runs elevated blood glucose     PROBLEM LIST   Patient Active Problem List   Diagnosis Code     PTSD (post-traumatic stress disorder) F43.10     CARDIOVASCULAR SCREENING; LDL GOAL LESS THAN 160 Z13.6     Hypovitaminosis D E55.9     Migraine with aura  G43.109     Bipolar disease, chronic (H) F31.9     Diabetic neuropathy (H) E11.40         PAST MEDICAL & SURGICAL HISTORY     Past Medical History:   Patient  has a past medical history of Anxiety, Bipolar disease, chronic (H), Diabetic neuropathy (H) (6/3/2021), Hypovitaminosis D (5/12/2014), " Iron deficiency, Migraines, Neuropathy, and PTSD (post-traumatic stress disorder) (4/16/2013).    Surgical History:  She  has a past surgical history that includes hysterectomy, pap no longer indicated (10/1/2009); REMOVAL OF OVARIAN CYST(S) (1987); TOOTH EXTRACTION W/FORCEP (1987); and Myringotomy.     SOCIAL HISTORY     Reviewed, and she  reports that she has never smoked. She has never used smokeless tobacco. She reports current alcohol use. She reports that she does not use drugs.     FAMILY HISTORY     Reviewed, and family history includes Bipolar Disorder in her son; Diabetes in her paternal grandmother and son.     ALLERGIES     Allergies   Allergen Reactions     Compazine      Anxiety           REVIEW OF SYSTEMS     A 12 point review of system was performed and was negative except as outlined in the history of present illness.     HOME MEDICATIONS     Current Outpatient Rx   Medication Sig Dispense Refill     Aspirin-Acetaminophen-Caffeine (EXCEDRIN MIGRAINE PO) Take  by mouth as needed.       cetirizine (ZYRTEC) 10 MG tablet Take 1 tablet (10 mg) by mouth daily 30 tablet 1     citalopram (CELEXA) 20 MG tablet Take 20 mg by mouth daily       citalopram (CELEXA) 40 MG tablet Take 40 mg by mouth daily.       gabapentin (NEURONTIN) 600 MG tablet Take 2 tablets (1,200 mg) by mouth 2 times daily 120 tablet 11     lamoTRIgine (LAMICTAL) 100 MG tablet Take 100 mg by mouth 3 times daily.       Vitamin D, Cholecalciferol, 1000 units TABS Take 1,000 mg by mouth daily       albuterol (PROAIR HFA/PROVENTIL HFA/VENTOLIN HFA) 108 (90 Base) MCG/ACT inhaler Inhale 2 puffs into the lungs every 4 hours as needed for shortness of breath / dyspnea or wheezing Via spacer 8.5 g 1     omeprazole (PRILOSEC) 20 MG DR capsule Take 1 capsule (20 mg) by mouth daily 15 capsule 1     pregabalin (LYRICA) 50 MG capsule Take 50 mg by mouth           PHYSICAL EXAM     Vital signs  BP 92/69 (BP Location: Right arm, Patient Position: Sitting)   " Pulse 82   Ht 1.651 m (5' 5\")   Wt 64.9 kg (143 lb)   BMI 23.80 kg/m      Weight:   143 lbs 0 oz    Patient is alert and oriented x4 in no acute distress. Vital signs were reviewed and are documented in electronic medical record. Neck was supple, no carotid bruits, thyromegaly, JVD, or lymphadenopathy was noted.   NEUROLOGY EXAM:    Patient s speech was normal with no aphasia or dysarthria. Mentation, and affect were also normal.     Funduscopic exam was normal, with normal cup to disc ratio. Cranial nerves II -XII were intact.     Patient had normal mass, tone and motor strength was 5/5 in all extremities without pronator drift.     Sensation was intact to light touch, pinprick, and vibratory sensation.     Reflexes were 1+ symmetrical with downgoing toes.     No dysmetria noted on FNF or HKS. Romberg was negative.    Gait testing was normal. Able to walk on toes/heels. Tandem walk normal.     DIAGNOSTIC STUDIES     PERTINENT RADIOLOGY  Following imaging studies were reviewed:     MRI BRAIN 7/9/2019  1.  No evidence of acute intracranial hemorrhage, mass effect, or infarction.  2.  Mild brain parenchymal volume loss.    EMG 8/21/2018  This is a normal study.  There is no electrophysiological evidence of a diffuse neuropathy, myopathy, or radiculpathy to affect the tested extremity muscles.     EMG 2/8/2011  This EMG nerve conduction study showed borderline reduced amplitude for the sural potentials, compatible with a very mild electrophysiological sensory neuropathy of the lower extremities.  Clinical correlation is recommended     PERTINENT LABS  Following labs were reviewed:  No visits with results within 3 Month(s) from this visit.   Latest known visit with results is:         Total time spent for face to face visit, reviewing labs/imaging studies, counseling and coordination of care was: 40 Minutes spent on the date of the encounter doing chart review, review of outside records, review of test results, " interpretation of tests, patient visit and documentation       This note was dictated using voice recognition software.  Any grammatical or context distortions are unintentional and inherent to the software.    Orders Placed This Encounter   Procedures     Hemoglobin A1c     Vitamin B12     Folate     Vitamin B1 plasma     Vitamin B6 ([facility])     Gabapentin level     Hepatic panel     EMG      New Prescriptions    No medications on file     Modified Medications    Modified Medication Previous Medication    GABAPENTIN (NEURONTIN) 600 MG TABLET gabapentin (NEURONTIN) 600 MG tablet       Take 2 tablets (1,200 mg) by mouth 2 times daily    Take 2 tablets (1,200 mg) by mouth 2 times daily PLEASE CALL TO SCHEDULE APPT WITH NEUROLOGIST FOR FURTHER REFILLS.                     Again, thank you for allowing me to participate in the care of your patient.        Sincerely,        Kedar Conn MD

## 2021-06-03 NOTE — PROGRESS NOTES
"NEUROLOGY FOLLOW UP VISIT  NOTE       [facility] NEUROLOGY [place]  [address] [place] [address]  Tel: [phone]  Fax: [phone]  [email or web address]     Lenore Martinez,  1960, MRN [id number]  PCP: Jewel Snyder  Date: 2021      ASSESSMENT & PLAN     Diagnosis code  Idiopathic progressive polyneuropathy     Idiopathic progressive polyneuropathy  60-year-old female who is been followed in the clinic for more than 10 years for \"polyneuropathy\".  It is variably labeled as a diabetic neuropathy and idiopathic neuropathy.  Patient never was diagnosed with diabetes but does report that she runs somewhat elevated blood glucose.  She never had hemoglobin A1c checked.  Most recent EMG done in 2018 also showed low compound motor action potential was read as normal.  She might have small fiber neuropathy and I have recommended:    1.  Repeat EMG of the lower extremity  2.  Check hemoglobin A1c, folate, vitamin B1, vitamin B6 and vitamin B12  3.  Continue gabapentin 1200 mg twice daily.  I refilled her prescription gave her 30-day supply with 11 refills  4.  Check gabapentin level and hepatic profile  5.  Follow-up will be the day she gets EMG    Thank you again for this referral, please feel free to contact me if you have any questions.    Kedar Conn MD  [facility] [place]  (Former[facility], [facility]     HISTORY OF PRESENT ILLNESS     Patient is a 60-year-old female with history of bipolar disorder, PTSD with more than 20 years history of numbness tingling in hands and feet.  She had painful dysesthesias  and had a evaluation done at [facility] without significant success.  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  She had a EMG in  that showed borderline reduced amplitude for the sural potentials " "compatible with very mild electrophysiological sensory neuropathy.  She was started on gabapentin with tolerable level up to stage years and a normal leg.  She had a repeat EMG done on 8/21/2018 that showed low compound motor action potentials of the peroneal, tibial nerves with normal conduction sural and superficial peroneal peak sensory latencies were normal.  And although it was read as normal she was continued on gabapentin.  She also had MRI done as she was having predominantly right-sided tremors.  However patient does not think this is a big problem and somehow diagnosis of essential tremor was entertained.  Patient also reported migraine headaches usually preceded by aura that are easily aborted by Excedrin migraine.  As noted above she also has bipolar disorder and PTSD and currently takes lamotrigine and Celexa.  Since her last visit she reports ongoing difficulty with numbness tingling in her feet.  She takes 1200 mg of gabapentin that seems to help.  Although her neuropathy has been labeled as \"diabetic neuropathy\" she never was diagnosed with diabetes and from her description sounds like she is prediabetic as she runs elevated blood glucose     PROBLEM LIST   Patient Active Problem List   Diagnosis Code     PTSD (post-traumatic stress disorder) F43.10     CARDIOVASCULAR SCREENING; LDL GOAL LESS THAN 160 Z13.6     Hypovitaminosis D E55.9     Migraine with aura  G43.109     Bipolar disease, chronic (H) F31.9     Diabetic neuropathy (H) E11.40         PAST MEDICAL & SURGICAL HISTORY     Past Medical History:   Patient  has a past medical history of Anxiety, Bipolar disease, chronic (H), Diabetic neuropathy (H) (6/3/2021), Hypovitaminosis D (5/12/2014), Iron deficiency, Migraines, Neuropathy, and PTSD (post-traumatic stress disorder) (4/16/2013).    Surgical History:  She  has a past surgical history that includes hysterectomy, pap no longer indicated (10/1/2009); REMOVAL OF OVARIAN CYST(S) (1987); TOOTH " "EXTRACTION W/FORCEP (1987); and Myringotomy.     SOCIAL HISTORY     Reviewed, and she  reports that she has never smoked. She has never used smokeless tobacco. She reports current alcohol use. She reports that she does not use drugs.     FAMILY HISTORY     Reviewed, and family history includes Bipolar Disorder in her son; Diabetes in her paternal grandmother and son.     ALLERGIES     Allergies   Allergen Reactions     Compazine      Anxiety           REVIEW OF SYSTEMS     A 12 point review of system was performed and was negative except as outlined in the history of present illness.     HOME MEDICATIONS     Current Outpatient Rx   Medication Sig Dispense Refill     Aspirin-Acetaminophen-Caffeine (EXCEDRIN MIGRAINE PO) Take  by mouth as needed.       cetirizine (ZYRTEC) 10 MG tablet Take 1 tablet (10 mg) by mouth daily 30 tablet 1     citalopram (CELEXA) 20 MG tablet Take 20 mg by mouth daily       citalopram (CELEXA) 40 MG tablet Take 40 mg by mouth daily.       gabapentin (NEURONTIN) 600 MG tablet Take 2 tablets (1,200 mg) by mouth 2 times daily 120 tablet 11     lamoTRIgine (LAMICTAL) 100 MG tablet Take 100 mg by mouth 3 times daily.       Vitamin D, Cholecalciferol, 1000 units TABS Take 1,000 mg by mouth daily       albuterol (PROAIR HFA/PROVENTIL HFA/VENTOLIN HFA) 108 (90 Base) MCG/ACT inhaler Inhale 2 puffs into the lungs every 4 hours as needed for shortness of breath / dyspnea or wheezing Via spacer 8.5 g 1     omeprazole (PRILOSEC) 20 MG DR capsule Take 1 capsule (20 mg) by mouth daily 15 capsule 1     pregabalin (LYRICA) 50 MG capsule Take 50 mg by mouth           PHYSICAL EXAM     Vital signs  BP 92/69 (BP Location: Right arm, Patient Position: Sitting)   Pulse 82   Ht 1.651 m (5' 5\")   Wt 64.9 kg (143 lb)   BMI 23.80 kg/m      Weight:   143 lbs 0 oz    Patient is alert and oriented x4 in no acute distress. Vital signs were reviewed and are documented in electronic medical record. Neck was supple, no " carotid bruits, thyromegaly, JVD, or lymphadenopathy was noted.   NEUROLOGY EXAM:    Patient s speech was normal with no aphasia or dysarthria. Mentation, and affect were also normal.     Funduscopic exam was normal, with normal cup to disc ratio. Cranial nerves II -XII were intact.     Patient had normal mass, tone and motor strength was 5/5 in all extremities without pronator drift.     Sensation was intact to light touch, pinprick, and vibratory sensation.     Reflexes were 1+ symmetrical with downgoing toes.     No dysmetria noted on FNF or HKS. Romberg was negative.    Gait testing was normal. Able to walk on toes/heels. Tandem walk normal.     DIAGNOSTIC STUDIES     PERTINENT RADIOLOGY  Following imaging studies were reviewed:     MRI BRAIN 7/9/2019  1.  No evidence of acute intracranial hemorrhage, mass effect, or infarction.  2.  Mild brain parenchymal volume loss.    EMG 8/21/2018  This is a normal study.  There is no electrophysiological evidence of a diffuse neuropathy, myopathy, or radiculpathy to affect the tested extremity muscles.     EMG 2/8/2011  This EMG nerve conduction study showed borderline reduced amplitude for the sural potentials, compatible with a very mild electrophysiological sensory neuropathy of the lower extremities.  Clinical correlation is recommended     PERTINENT LABS  Following labs were reviewed:  No visits with results within 3 Month(s) from this visit.   Latest known visit with results is:         Total time spent for face to face visit, reviewing labs/imaging studies, counseling and coordination of care was: 40 Minutes spent on the date of the encounter doing chart review, review of outside records, review of test results, interpretation of tests, patient visit and documentation     This note was dictated using voice recognition software.  Any grammatical or context distortions are unintentional and inherent to the software.    Orders Placed This Encounter   Procedures      Hemoglobin A1c     Vitamin B12     Folate     Vitamin B1 plasma     Vitamin B6 ([facility])     Gabapentin level     Hepatic panel     EMG      New Prescriptions    No medications on file     Modified Medications    Modified Medication Previous Medication    GABAPENTIN (NEURONTIN) 600 MG TABLET gabapentin (NEURONTIN) 600 MG tablet       Take 2 tablets (1,200 mg) by mouth 2 times daily    Take 2 tablets (1,200 mg) by mouth 2 times daily PLEASE CALL TO SCHEDULE APPT WITH NEUROLOGIST FOR FURTHER REFILLS.

## 2021-06-03 NOTE — NURSING NOTE
Chief Complaint   Patient presents with     NEUROPATHY     Dr. Ramsey pt.  Pt states she has neuropathy in bilat feet and legs, smetimes keep her awake at night.     Maddy Munoz LPN on 6/3/2021 at 10:09 AM

## 2021-06-11 DIAGNOSIS — E08.42 DIABETIC POLYNEUROPATHY ASSOCIATED WITH DIABETES MELLITUS DUE TO UNDERLYING CONDITION (H): ICD-10-CM

## 2021-06-11 LAB
ALBUMIN SERPL-MCNC: 4.2 G/DL (ref 3.4–5)
ALP SERPL-CCNC: 73 U/L (ref 40–150)
ALT SERPL W P-5'-P-CCNC: 19 U/L (ref 0–50)
AST SERPL W P-5'-P-CCNC: 14 U/L (ref 0–45)
BILIRUB DIRECT SERPL-MCNC: 0.2 MG/DL (ref 0–0.2)
BILIRUB SERPL-MCNC: 0.9 MG/DL (ref 0.2–1.3)
FOLATE SERPL-MCNC: 8.9 NG/ML
HBA1C MFR BLD: 6.1 % (ref 0–5.6)
PROT SERPL-MCNC: 6.8 G/DL (ref 6.8–8.8)
VIT B12 SERPL-MCNC: 374 PG/ML (ref 193–986)

## 2021-06-11 PROCEDURE — 36415 COLL VENOUS BLD VENIPUNCTURE: CPT | Performed by: PSYCHIATRY & NEUROLOGY

## 2021-06-11 PROCEDURE — 80076 HEPATIC FUNCTION PANEL: CPT | Performed by: PSYCHIATRY & NEUROLOGY

## 2021-06-11 PROCEDURE — 84425 ASSAY OF VITAMIN B-1: CPT | Mod: 90 | Performed by: PSYCHIATRY & NEUROLOGY

## 2021-06-11 PROCEDURE — 83036 HEMOGLOBIN GLYCOSYLATED A1C: CPT | Performed by: PSYCHIATRY & NEUROLOGY

## 2021-06-11 PROCEDURE — 82746 ASSAY OF FOLIC ACID SERUM: CPT | Performed by: PSYCHIATRY & NEUROLOGY

## 2021-06-11 PROCEDURE — 82607 VITAMIN B-12: CPT | Performed by: PSYCHIATRY & NEUROLOGY

## 2021-06-11 PROCEDURE — 99000 SPECIMEN HANDLING OFFICE-LAB: CPT | Performed by: PSYCHIATRY & NEUROLOGY

## 2021-06-11 PROCEDURE — 80171 DRUG SCREEN QUANT GABAPENTIN: CPT | Mod: 90 | Performed by: PSYCHIATRY & NEUROLOGY

## 2021-06-12 LAB — GABAPENTIN SERPLBLD-MCNC: 10.4 UG/ML (ref 4–16)

## 2021-06-17 LAB — VIT B1 SERPL-MCNC: 42 NMOL/L (ref 4–15)

## 2021-07-21 ENCOUNTER — RECORDS - HEALTHEAST (OUTPATIENT)
Dept: ADMINISTRATIVE | Facility: CLINIC | Age: 61
End: 2021-07-21

## 2021-08-04 ENCOUNTER — E-VISIT (OUTPATIENT)
Dept: FAMILY MEDICINE | Facility: CLINIC | Age: 61
End: 2021-08-04
Payer: COMMERCIAL

## 2021-08-04 DIAGNOSIS — R21 RASH: Primary | ICD-10-CM

## 2021-08-04 PROCEDURE — 99421 OL DIG E/M SVC 5-10 MIN: CPT | Performed by: FAMILY MEDICINE

## 2021-08-04 NOTE — PATIENT INSTRUCTIONS
Lenore,    Thank you for choosing us for your care. Are you able to attach a picture for me to see through your MyChart?     Have you been on antibiotics recently? Have you had any change in medications?     If you are having shortness of breath, wheezing, chest pain, or difficulty swallowing, please be seen today in urgent care.     Hives are an itchy rash than in many cases have an unknown cause, but an allergy to medication is important to consider. Symptoms are best treated with antihistamine medication like Zyrtec. You can add an over-the-counter medication called Zantac or Tagamet as well (these are also histamine blockers, usually used for heartburn). If your symptoms are severe, a course of steroid medication like prednisone can be considered. This can increase risk for rebound symptoms or recurrence, however.     If you're not feeling better within 2-3 days, please respond to this message and we can consider if a prescription is needed.  You can schedule an appointment right here in [facility], or call [phone]  If the visit is for the same symptoms as your eVisit, we'll refund the cost of your eVisit if seen within seven days.      Nona Oropeza MD

## 2021-10-23 ENCOUNTER — HEALTH MAINTENANCE LETTER (OUTPATIENT)
Age: 61
End: 2021-10-23

## 2021-10-26 ENCOUNTER — OFFICE VISIT (OUTPATIENT)
Dept: NEUROLOGY | Facility: CLINIC | Age: 61
End: 2021-10-26
Payer: COMMERCIAL

## 2021-10-26 VITALS
WEIGHT: 143 LBS | BODY MASS INDEX: 23.82 KG/M2 | HEIGHT: 65 IN | DIASTOLIC BLOOD PRESSURE: 60 MMHG | SYSTOLIC BLOOD PRESSURE: 93 MMHG | HEART RATE: 74 BPM

## 2021-10-26 DIAGNOSIS — E08.42 DIABETIC POLYNEUROPATHY ASSOCIATED WITH DIABETES MELLITUS DUE TO UNDERLYING CONDITION (H): Primary | ICD-10-CM

## 2021-10-26 PROCEDURE — 99213 OFFICE O/P EST LOW 20 MIN: CPT | Performed by: PSYCHIATRY & NEUROLOGY

## 2021-10-26 PROCEDURE — 95886 MUSC TEST DONE W/N TEST COMP: CPT | Mod: LT | Performed by: PSYCHIATRY & NEUROLOGY

## 2021-10-26 PROCEDURE — 95910 NRV CNDJ TEST 7-8 STUDIES: CPT | Performed by: PSYCHIATRY & NEUROLOGY

## 2021-10-26 ASSESSMENT — MIFFLIN-ST. JEOR: SCORE: 1224.52

## 2021-10-26 NOTE — PATIENT INSTRUCTIONS
Patient Education   Coping with Nerve Damage (Peripheral Neuropathy)  What causes nerve damage?  Some illnesses, certain medicines, injury and very poor diet may cause problems with some nerves in your body (called peripheral neuropathy).  You may feel tingling, itching, burning, weakness or numbness in your fingers, hands, toes or feet. You may have less feeling: You may be unable to feel hot, cold or pain.  While this may improve over time, there could be long-term problems.  How is it treated?  If your chemotherapy drugs are the cause of the nerve damage, your care team may stop them or change to safer drugs.  There are several medicines that are helpful in treating nerve damage.  You may also ask your care team about alternate treatments, such as:    Acupuncture    Massage    Physical therapy    A referral to [facility].  What else can I do to protect myself?    Protect your feet, especially if they are numb or have less feeling than usual. Always wear well-fitted shoes with rubber soles, even around the house.    Move carefully, especially on the stairs. Use the handrails.    Apply nonskid surfaces on floors, tubs and showers so you do not slip and fall..    Remove items, such as rugs, that might make you trip.    Keep rooms well lighted.    Protect your hands and fingers if they are numb or have less feeling. Be careful when grasping hot or sharp objects.    Lower the temperature on your hot water heater as needed.    Wear padded gloves when you reach into the oven or  hot pots and pans. Be extra careful when cooking or ironing.    Inspect skin for cuts, scrapes and burns daily    Wear heavy work gloves when you dig in the garden or work around thorny plants.    Keep walking or doing other mild exercise.    Share your feelings and worries about nerve damage with your family, friends, james leader and care team. A support group may also help--be sure to ask your care team  for information.  When should I call my care team?  Call your care team if:    The symptoms (tingling, burning, weakness, numbness) get worse in your fingers, hands, toes or feet.    You have trouble using buttons or zippers on your clothes.    You have trouble walking.    You hurt yourself and the area becomes red, painful or swollen.  Comments:  __________________________________________  __________________________________________  __________________________________________  __________________________________________  __________________________________________  __________________________________________  For informational purposes only. Not to replace the advice of your health care provider. Copyright   2006 [employer]. All rights reserved. Clinically reviewed by [facility]. SM[employer] 864626 - REV 04/19

## 2021-10-26 NOTE — LETTER
10/26/2021         RE: Lenore Martinez  [address]  [place] [address]        Dear Colleague,    Thank you for referring your patient, Lenore Martinez, to the [facility] NEUROLOGY CLINIC [place]. Please see a copy of my visit note below.    NEUROLOGY FOLLOW UP VISIT  NOTE       [facility] NEUROLOGY [place]  [address] [place] [address]  Tel: [phone]  Fax: [phone]  [email or web address]     Lenore Martinez,  1960, MRN [id number]  PCP: Nona Oropeza  Date: 10/26/2021      ASSESSMENT & PLAN     Visit Diagnosis  1. Diabetic polyneuropathy associated with diabetes mellitus due to underlying condition (H)     Diabetic polyneuropathy  61-year-old female with history of bipolar disorder, PTSD who is been followed in our clinic for more than 10 years for polyneuropathy.  She has borderline elevated hemoglobin A1c and her EMG today confirms length dependent mixed sensorimotor polyneuropathy.  Compared to her previous EMG done in 2018 there is minimal change.  I have asked her to continue on gabapentin.  Recent lab work included normal folate, vitamin B1, B6, B12 and a gabapentin level.  Her hemoglobin A1c was elevated at 6.1.  I have asked her to continue on current dose of gabapentin 1200 mg twice daily.  Prescription was filled during her previous visit.  Regular follow-up will be in 1 year    Thank you again for this referral, please feel free to contact me if you have any questions.    Kedar Conn MD  [facility]  ([facility], [facility]     HISTORY OF PRESENT ILLNESS     Patient is a 61-year-old female with history of bipolar disorder, PTSD who is been followed in our clinic for complaint of numbness and tingling in the hands and feet.  She had painful dysesthesias and had an evaluation done at [facility] but no etiology was found.  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  EMG in 2011 showed borderline reduced amplitude for the sural potential compatible with early electrophysiological sensory neuropathy.  She was started on Neurontin.  As she was having right-sided tremors and MRI was done and although patient does not think this was a major issue the diagnosis of essential tremor was entertained.    She also has history of migraine headaches usually preceded by an aura that are easily aborted with Excedrin Migraine.  She also has bipolar disorder and PTSD and takes lamotrigine and Celexa.  During her last visit she had reported that her symptoms are progressively getting worse and had a repeat EMG done today that shows low amplitude compound motor action potential and also sural SNAP suggesting length dependent sensorimotor polyneuropathy.  Lab work for common causes of neuropathy was normal except her hemoglobin A1c was 6.1 suggesting she is prediabetic.  She also had a gabapentin level checked during her last visit that was therapeutic.     PROBLEM LIST   Patient Active Problem List   Diagnosis Code     PTSD (post-traumatic stress disorder) F43.10     CARDIOVASCULAR SCREENING; LDL GOAL LESS THAN 160 Z13.6     Hypovitaminosis D E55.9     Migraine with aura  G43.109     Bipolar disease, chronic (H) F31.9     Diabetic neuropathy (H) E11.40         PAST MEDICAL & SURGICAL HISTORY     Past Medical History:   Patient  has a past medical history of Anxiety, Bipolar disease, chronic (H), Diabetic neuropathy (H) (6/3/2021), Hypovitaminosis D (5/12/2014), Iron deficiency, Migraines, Neuropathy, and PTSD (post-traumatic stress disorder) (4/16/2013).    Surgical History:  She  has a past surgical history that includes hysterectomy, pap no longer indicated (10/1/2009); REMOVAL OF OVARIAN CYST(S) (1987); TOOTH EXTRACTION W/FORCEP (1987); and Myringotomy.     SOCIAL HISTORY     Reviewed, and she  reports that she has never smoked. She has never  "used smokeless tobacco. She reports current alcohol use. She reports that she does not use drugs.     FAMILY HISTORY     Reviewed, and family history includes Bipolar Disorder in her son; Diabetes in her paternal grandmother and son.     ALLERGIES     Allergies   Allergen Reactions     Compazine      Anxiety           REVIEW OF SYSTEMS     A 12 point review of system was performed and was negative except as outlined in the history of present illness.     HOME MEDICATIONS     Current Outpatient Rx   Medication Sig Dispense Refill     albuterol (PROAIR HFA/PROVENTIL HFA/VENTOLIN HFA) 108 (90 Base) MCG/ACT inhaler Inhale 2 puffs into the lungs every 4 hours as needed for shortness of breath / dyspnea or wheezing Via spacer 8.5 g 1     Aspirin-Acetaminophen-Caffeine (EXCEDRIN MIGRAINE PO) Take  by mouth as needed.       cetirizine (ZYRTEC) 10 MG tablet Take 1 tablet (10 mg) by mouth daily 30 tablet 1     citalopram (CELEXA) 20 MG tablet Take 20 mg by mouth daily       gabapentin (NEURONTIN) 600 MG tablet Take 2 tablets (1,200 mg) by mouth 2 times daily 120 tablet 11     lamoTRIgine (LAMICTAL) 100 MG tablet Take 100 mg by mouth 3 times daily.       omeprazole (PRILOSEC) 20 MG DR capsule Take 1 capsule (20 mg) by mouth daily 15 capsule 1     Vitamin D, Cholecalciferol, 1000 units TABS Take 1,000 mg by mouth daily       citalopram (CELEXA) 40 MG tablet Take 40 mg by mouth daily. (Patient not taking: Reported on 10/26/2021)           PHYSICAL EXAM     Vital signs  BP 93/60 (BP Location: Left arm, Patient Position: Sitting)   Pulse 74   Ht 1.651 m (5' 5\")   Wt 64.9 kg (143 lb)   BMI 23.80 kg/m      Weight:   143 lbs 0 oz    Patient is alert and oriented x4 in no acute distress. Vital signs were reviewed and are documented in electronic medical record. Neck was supple, no carotid bruits, thyromegaly, JVD, or lymphadenopathy was noted.   NEUROLOGY EXAM:    Patient s speech was normal with no aphasia or dysarthria. " Mentation, and affect were also normal.     Funduscopic exam was normal, with normal cup to disc ratio. Cranial nerves II -XII were intact.     Patient had normal mass, tone and motor strength was 5/5 in all extremities without pronator drift.     Sensation was intact to light touch, pinprick, and vibratory sensation.     Reflexes were 1+ symmetrical with downgoing toes.     No dysmetria noted on FNF or HKS. Romberg was negative.    Gait testing was normal. Able to walk on toes/heels. Tandem walk normal.     DIAGNOSTIC STUDIES     PERTINENT RADIOLOGY  Following imaging studies were reviewed:     MRI BRAIN 7/9/2019  1.  No evidence of acute intracranial hemorrhage, mass effect, or infarction.  2.  Mild brain parenchymal volume loss.     EMG 10/26/2021  This is a borderline abnormal EEG that suggests length dependent mixed sensorimotor polyneuropathy.  This likely is due to patient's known prediabetes, clinical correlation is recommended.    EMG 8/21/2018  This is a normal study.  There is no electrophysiological evidence of a diffuse neuropathy, myopathy, or radiculpathy to affect the tested extremity muscles.      EMG 2/8/2011  This EMG nerve conduction study showed borderline reduced amplitude for the sural potentials, compatible with a very mild electrophysiological sensory neuropathy of the lower extremities.  Clinical correlation is recommended     PERTINENT LABS  Following labs were reviewed:  No visits with results within 3 Month(s) from this visit.   Latest known visit with results is:   Orders Only on 06/11/2021   Component Date Value     Gabapentin Level 06/11/2021 10.4      Vitamin B1 06/11/2021 42*     Vitamin B12 06/11/2021 374      Folate 06/11/2021 8.9      Bilirubin Direct 06/11/2021 0.2      Bilirubin Total 06/11/2021 0.9      Albumin 06/11/2021 4.2      Protein Total 06/11/2021 6.8      Alkaline Phosphatase 06/11/2021 73      ALT 06/11/2021 19      AST 06/11/2021 14      Hemoglobin A1C 06/11/2021  6.1*         Total time spent for face to face visit, reviewing labs/imaging studies, counseling and coordination of care was: 20 minutes spent on the date of the encounter doing chart review, review of outside records, review of test results, interpretation of tests, patient visit and documentation       This note was dictated using voice recognition software.  Any grammatical or context distortions are unintentional and inherent to the software.    No orders of the defined types were placed in this encounter.     New Prescriptions    No medications on file     Modified Medications    No medications on file                     Again, thank you for allowing me to participate in the care of your patient.        Sincerely,        Kedar Conn MD

## 2021-10-26 NOTE — NURSING NOTE
Chief Complaint   Patient presents with     Results     Discuss EMG and lab results     Maddy Munoz LPN on 10/26/2021 at 1:48 PM

## 2021-10-26 NOTE — LETTER
10/26/2021         RE: Lenore Martinez  [address]  [place] [address]        Dear Colleague,    Thank you for referring your patient, Lenore Martinez, to the [facility] NEUROLOGY CL[facility]IC [place]. Please see a copy of my visit note below.    See procedure note for EMG report      Again, thank you for allowing me to participate in the care of your patient.        Sincerely,        Kedar Conn MD

## 2021-10-26 NOTE — PROCEDURES
ELECTROMYOGRAPHY (EMG) REPORT       [facility] NEUROLOGY [place]  [address] [place] [address]  Tel: [phone]  Fax: [phone]  [email or web address]     Lenore Martinez,  1960, MRN [id number]  PCP: Nona Oropeza  Date: 10/26/2021     Principal Diagnosis: Diabetic polyneuropathy associated with diabetes mellitus due to underlying condition (H)     Height: 5 feet 5 inch  Reason for referral: Evaluate bilateral lowers. c/o tingling, numbness in both legs > 10 years. Left > Right.       Motor NCS      Nerve / Sites Lat Amp Dist Ciaran    ms mV cm m/s   L Peroneal - EDB      Ankle 4.64 3.2 8       Fib head 11.35 3.1 31 46      Pop fossa 13.39 3.0 10 49   R Peroneal - EDB      Ankle 3.85 3.5 8       Fib head 10.36 3.0 27 41      Pop fossa 12.40 2.9 10 49   L Tibial - AH      Ankle 4.84 12.6 8       Pop fossa 13.23 11.4 37 44   R Tibial - AH      Ankle 5.26 8.6 8       Pop fossa 13.59 8.6 35 42       F  Wave      Nerve Fmin    ms   L Tibial - AH 53.28   R Tibial - AH 54.06       Sensory NCS      Nerve / Sites Onset Lat Pk Lat Amp.2-3 Dist Ciaran    ms ms  V cm m/s   L Sural - Ankle (Calf)      Calf 3.54 4.38 7.9 14 40   R Sural - Ankle (Calf)      Calf 3.13 3.44 4.3 14 45   L Superficial peroneal - Ankle      Lat leg 2.81 3.54 2.9 12 43   R Superficial peroneal - Ankle      Lat leg 2.66 3.39 4.0 12 45       EMG Summary Table     Spontaneous MUAP Rcmt Note   Muscle Fib PSW Fasc IA # Amp Dur PPP Rate Type   L. Gluteus medius None None None N N N N N N N   L. Gluteus wallace None None None N N N N N N N   L. L3 paraspinal None None None N N N N N N N   L. L4 paraspinal None None None N N N N N N N   L. L5 paraspinal None None None N N N N N N N   L. S1 paraspinal None None None N N N N N N N   L. Adductor micha None None None N N N N N N N   L. Quadriceps None None None N N N N N N N   L. Gastrocnemius (Medial head) None None None N N N N N N N   L. Tibialis anterior None None  None N N N N N N N   L. Tibialis posterior None None None N N N N N N N        Summary: Nerve conduction and EMG study of lower extremities shows:  1. Low amplitude bilateral peroneal compound motor action potentials with normal latencies and borderline conduction velocities.  2. Normal bilateral tibial distal motor latencies, amplitudes and conduction velocities.  3. Normal bilateral tibial F latencies  4. Borderline left sural peak sensory latency with borderline sensory nerve conduction velocity  5. Normal right sural and bilateral superficial peroneal SNAP  6. Needle exam was normal    Impression:   This is a borderline abnormal EEG that suggests length dependent mixed sensorimotor polyneuropathy.  This likely is due to patient's known prediabetes, clinical correlation is recommended.      Kedar Conn MD  [facility]  (Formerly, [facility]      This note was dictated using voice recognition software.  Any grammatical or context distortions are unintentional and inherent to the software.

## 2022-02-01 ENCOUNTER — PATIENT OUTREACH (OUTPATIENT)
Dept: FAMILY MEDICINE | Facility: CLINIC | Age: 62
End: 2022-02-01

## 2022-02-01 NOTE — TELEPHONE ENCOUNTER
Patient Quality Outreach      Summary:    Patient has the following on her problem list/HM: None    Patient is due/failing the following:   Diabetes -  A1C  Asthma  -  ACT needed  Breast Cancer Screening - Mammogram  Physical  - Overdue    Type of outreach:    Sent Smailext message.    Questions for provider review:    None                                                                                                                                     [facility]        Chart routed to Care Team.

## 2022-02-01 NOTE — LETTER
February 8, 2022      Lenore OVI Juan  [address]  [place] [address]      Your healthcare team cares about your health. To provide you with the best care,   we have reviewed your chart and based on our findings, we see that you are due to:     - ASTHMA FOLLOW UP:  Complete and return the attached Asthma Control Test.  If your total score is 19 or less or you have been to the ER or urgent care for your asthma, then please schedule an asthma follow-up appointment.  - DIABETES FOLLOW UP: Schedule a diabetic follow up appointment as a Office Visit. Patients with diabetes should generally see their provider every 3-6 months.    Schedule a DIABETIC EYE EXAM.  This exam is done with an optometrist. You can schedule this appointment with your eye doctor.  If you need a referral, please let us know.  - BREAST CANCER SCREENING:  Schedule Annual Mammogram. Breast center scheduling number - [phone] or schedule in [email or web address] (self referall)  - OTHER FOLLOW UP:  Physical    If you have already completed these items, please contact the clinic via phone or   [email or web address] so your care team can review and update your records. Thank you for   choosing [facility] for your healthcare needs. For any questions,   concerns, or to schedule an appointment please contact the clinic.       Healthy Regards,      Your [facility] Care Team

## 2022-02-08 NOTE — TELEPHONE ENCOUNTER
Patient Quality Outreach 2nd Attempt      Summary:    Type of outreach:    Sent letter.    Next Steps:  Reach out within 90 days via [employer].    Max number of attempts reached: Yes. Will try again in 90 days if patient still on fail list.    Questions for provider review:    None                                                                                                                    Hannah Jane [facility]        Chart routed to Care Team.

## 2022-02-12 ENCOUNTER — HEALTH MAINTENANCE LETTER (OUTPATIENT)
Age: 62
End: 2022-02-12

## 2022-04-25 PROBLEM — E78.5 HYPERLIPIDEMIA LDL GOAL <70: Status: ACTIVE | Noted: 2022-04-25

## 2022-06-04 ENCOUNTER — HEALTH MAINTENANCE LETTER (OUTPATIENT)
Age: 62
End: 2022-06-04

## 2022-06-12 DIAGNOSIS — E08.42 DIABETIC POLYNEUROPATHY ASSOCIATED WITH DIABETES MELLITUS DUE TO UNDERLYING CONDITION (H): ICD-10-CM

## 2022-06-13 RX ORDER — GABAPENTIN 600 MG/1
TABLET ORAL
Qty: 120 TABLET | Refills: 11 | Status: SHIPPED | OUTPATIENT
Start: 2022-06-13 | End: 2022-10-25

## 2022-06-13 NOTE — TELEPHONE ENCOUNTER
Refill request for Gabapentin. Pt last seen 10/26/21 and has follow up scheduled for 10/25/22. Will send in refills.     Mi Warner RN on 6/13/2022 at 10:33 AM

## 2022-07-25 ENCOUNTER — TELEPHONE (OUTPATIENT)
Dept: FAMILY MEDICINE | Facility: CLINIC | Age: 62
End: 2022-07-25

## 2022-07-25 NOTE — TELEPHONE ENCOUNTER
Patient Quality Outreach    Patient is due for the following:   Diabetes -  A1C, Eye Exam and Microalbumin  Breast Cancer Screening - Mammogram  Physical  - Overdue    NEXT STEPS:   Schedule a yearly physical    Type of outreach:    Sent letter.      Questions for provider review:    None     [facility] Jane [facility]  Chart routed to Care Team.

## 2022-07-25 NOTE — LETTER
July 25, 2022      Lenore Martinez  [address]  [place] [address]      Your healthcare team cares about your health. To provide you with the best care,   we have reviewed your chart and based on our findings, we see that you are due to:     - DIABETES FOLLOW UP: Schedule a diabetic follow up appointment as a Office Visit. Patients with diabetes should generally see their provider every 3-6 months.    Schedule a DIABETIC EYE EXAM.  This exam is done with an optometrist. You can schedule this appointment with your eye doctor.  If you need a referral, please let us know.  - BREAST CANCER SCREENING:  Schedule Annual Mammogram. Breast center scheduling number - [phone] or schedule in MyChart (self referall)  - ANNUAL WELLNESS FOLLOW UP:   Schedule an Annual Medicare Wellness Exam. This can be done by in person visit or virtual video visit.     If you have already completed these items, please contact the clinic via phone or   [email or web address] so your care team can review and update your records. Thank you for   choosing [facility] for your healthcare needs. For any questions,   concerns, or to schedule an appointment please contact the clinic.       Healthy Regards,      Your [facility] Care Team

## 2022-08-08 NOTE — TELEPHONE ENCOUNTER
Patient Quality Outreach    Patient is due for the following:   Diabetes -  A1C, LDL (Fasting), Eye Exam and Foot Exam  Physical Preventive Adult Physical    Next Steps:   Schedule a Adult Preventative    Type of outreach:    Mammogram scheduled for 8/15/22, Physical scheduled for 8/26/22 with Sudhakar Ledezma.    Next Steps:  Reach out within 90 days via [email or web address].    Max number of attempts reached: Yes. Will try again in 90 days if patient still on fail list.    Questions for provider review:    None     Hannah Jane CMA  Chart routed to Care Team.

## 2022-08-22 NOTE — PATIENT INSTRUCTIONS
Schedule GI appointment - stomach pain/regurgitation  Schedule Neuropsych testing - alzheimers testing  Get Shingles (Zoster) vaccine at your pharmacy  Schedule mammogram.  Schedule colonoscopy.          Preventive Health Recommendations  Female Ages 50 - 64    Yearly exam: See your health care provider every year in order to  Review health changes.   Discuss preventive care.    Review your medicines if your doctor has prescribed any.    Get a Pap test every three years (unless you have an abnormal result and your provider advises testing more often).  If you get Pap tests with HPV test, you only need to test every 5 years, unless you have an abnormal result.   You do not need a Pap test if your uterus was removed (hysterectomy) and you have not had cancer.  You should be tested each year for STDs (sexually transmitted diseases) if you're at risk.   Have a mammogram every 1 to 2 years.  Have a colonoscopy at age 50, or have a yearly FIT test (stool test). These exams screen for colon cancer.    Have a cholesterol test every 5 years, or more often if advised.  Have a diabetes test (fasting glucose) every three years. If you are at risk for diabetes, you should have this test more often.   If you are at risk for osteoporosis (brittle bone disease), think about having a bone density scan (DEXA).    Shots: Get a flu shot each year. Get a tetanus shot every 10 years.    Nutrition:   Eat at least 5 servings of fruits and vegetables each day.  Eat whole-grain bread, whole-wheat pasta and brown rice instead of white grains and rice.  Get adequate Calcium and Vitamin D.     Lifestyle  Exercise at least 150 minutes a week (30 minutes a day, 5 days a week). This will help you control your weight and prevent disease.  Limit alcohol to one drink per day.  No smoking.   Wear sunscreen to prevent skin cancer.   See your dentist every six months for an exam and cleaning.  See your eye doctor every 1 to 2 years.    Preventive Health  Recommendations  Female Ages 50 - 64    Yearly exam: See your health care provider every year in order to  Review health changes.   Discuss preventive care.    Review your medicines if your doctor has prescribed any.    Get a Pap test every three years (unless you have an abnormal result and your provider advises testing more often).  If you get Pap tests with HPV test, you only need to test every 5 years, unless you have an abnormal result.   You do not need a Pap test if your uterus was removed (hysterectomy) and you have not had cancer.  You should be tested each year for STDs (sexually transmitted diseases) if you're at risk.   Have a mammogram every 1 to 2 years.  Have a colonoscopy at age 50, or have a yearly FIT test (stool test). These exams screen for colon cancer.    Have a cholesterol test every 5 years, or more often if advised.  Have a diabetes test (fasting glucose) every three years. If you are at risk for diabetes, you should have this test more often.   If you are at risk for osteoporosis (brittle bone disease), think about having a bone density scan (DEXA).    Shots: Get a flu shot each year. Get a tetanus shot every 10 years.    Nutrition:   Eat at least 5 servings of fruits and vegetables each day.  Eat whole-grain bread, whole-wheat pasta and brown rice instead of white grains and rice.  Get adequate Calcium and Vitamin D.     Lifestyle  Exercise at least 150 minutes a week (30 minutes a day, 5 days a week). This will help you control your weight and prevent disease.  Limit alcohol to one drink per day.  No smoking.   Wear sunscreen to prevent skin cancer.   See your dentist every six months for an exam and cleaning.  See your eye doctor every 1 to 2 years.

## 2022-08-26 ENCOUNTER — OFFICE VISIT (OUTPATIENT)
Dept: FAMILY MEDICINE | Facility: CLINIC | Age: 62
End: 2022-08-26
Payer: COMMERCIAL

## 2022-08-26 VITALS
HEART RATE: 78 BPM | HEIGHT: 65 IN | TEMPERATURE: 98.3 F | BODY MASS INDEX: 25.39 KG/M2 | DIASTOLIC BLOOD PRESSURE: 74 MMHG | OXYGEN SATURATION: 98 % | WEIGHT: 152.4 LBS | SYSTOLIC BLOOD PRESSURE: 108 MMHG | RESPIRATION RATE: 12 BRPM

## 2022-08-26 DIAGNOSIS — R11.11 NON-INTRACTABLE VOMITING WITHOUT NAUSEA, UNSPECIFIED VOMITING TYPE: ICD-10-CM

## 2022-08-26 DIAGNOSIS — E11.42 TYPE 2 DIABETES MELLITUS WITH DIABETIC POLYNEUROPATHY, WITHOUT LONG-TERM CURRENT USE OF INSULIN (H): ICD-10-CM

## 2022-08-26 DIAGNOSIS — K21.9 GASTROESOPHAGEAL REFLUX DISEASE WITHOUT ESOPHAGITIS: ICD-10-CM

## 2022-08-26 DIAGNOSIS — R94.6 THYROID FUNCTION TEST ABNORMAL: ICD-10-CM

## 2022-08-26 DIAGNOSIS — Z00.00 ROUTINE GENERAL MEDICAL EXAMINATION AT A HEALTH CARE FACILITY: ICD-10-CM

## 2022-08-26 DIAGNOSIS — Z12.11 SCREENING FOR COLON CANCER: ICD-10-CM

## 2022-08-26 DIAGNOSIS — R41.3 MEMORY IMPAIRMENT OF GRADUAL ONSET: ICD-10-CM

## 2022-08-26 DIAGNOSIS — Z13.220 SCREENING FOR HYPERLIPIDEMIA: Primary | ICD-10-CM

## 2022-08-26 DIAGNOSIS — Z23 HIGH PRIORITY FOR COVID-19 VACCINATION: ICD-10-CM

## 2022-08-26 LAB — HBA1C MFR BLD: 5.5 % (ref 0–5.6)

## 2022-08-26 PROCEDURE — 99214 OFFICE O/P EST MOD 30 MIN: CPT | Mod: 25

## 2022-08-26 PROCEDURE — 82043 UR ALBUMIN QUANTITATIVE: CPT

## 2022-08-26 PROCEDURE — 36415 COLL VENOUS BLD VENIPUNCTURE: CPT

## 2022-08-26 PROCEDURE — 80048 BASIC METABOLIC PNL TOTAL CA: CPT

## 2022-08-26 PROCEDURE — 99386 PREV VISIT NEW AGE 40-64: CPT | Mod: 25

## 2022-08-26 PROCEDURE — 80061 LIPID PANEL: CPT

## 2022-08-26 PROCEDURE — 91305 COVID-19,PF,PFIZER (12+ YRS): CPT

## 2022-08-26 PROCEDURE — 83036 HEMOGLOBIN GLYCOSYLATED A1C: CPT

## 2022-08-26 PROCEDURE — 84443 ASSAY THYROID STIM HORMONE: CPT

## 2022-08-26 PROCEDURE — 0054A COVID-19,PF,PFIZER (12+ YRS): CPT

## 2022-08-26 RX ORDER — OMEPRAZOLE 40 MG/1
40 CAPSULE, DELAYED RELEASE ORAL DAILY
Qty: 90 CAPSULE | Refills: 0 | Status: SHIPPED | OUTPATIENT
Start: 2022-08-26 | End: 2022-10-25

## 2022-08-26 ASSESSMENT — PAIN SCALES - GENERAL: PAINLEVEL: NO PAIN (0)

## 2022-08-26 ASSESSMENT — ENCOUNTER SYMPTOMS
DIARRHEA: 0
PARESTHESIAS: 0
COUGH: 0
SORE THROAT: 0
BREAST MASS: 0
HEADACHES: 0
NAUSEA: 0
HEMATURIA: 0
FEVER: 0
JOINT SWELLING: 0
HEARTBURN: 0
WEAKNESS: 0
NERVOUS/ANXIOUS: 1
CONSTIPATION: 0
ABDOMINAL PAIN: 0
EYE PAIN: 0
SHORTNESS OF BREATH: 0
DYSURIA: 0
FREQUENCY: 0
MYALGIAS: 1
HEMATOCHEZIA: 0
PALPITATIONS: 0
CHILLS: 0
ARTHRALGIAS: 0
DIZZINESS: 1
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
## 2022-08-27 LAB
ANION GAP SERPL CALCULATED.3IONS-SCNC: 5 MMOL/L (ref 3–14)
BUN SERPL-MCNC: 13 MG/DL (ref 7–30)
CALCIUM SERPL-MCNC: 9.2 MG/DL (ref 8.5–10.1)
CHLORIDE BLD-SCNC: 107 MMOL/L (ref 94–109)
CHOLEST SERPL-MCNC: 226 MG/DL
CO2 SERPL-SCNC: 26 MMOL/L (ref 20–32)
CREAT SERPL-MCNC: 0.85 MG/DL (ref 0.52–1.04)
CREAT UR-MCNC: 155 MG/DL
FASTING STATUS PATIENT QL REPORTED: NO
GFR SERPL CREATININE-BSD FRML MDRD: 78 ML/MIN/1.73M2
GLUCOSE BLD-MCNC: 104 MG/DL (ref 70–99)
HDLC SERPL-MCNC: 65 MG/DL
LDLC SERPL CALC-MCNC: 150 MG/DL
MICROALBUMIN UR-MCNC: 14 MG/L
MICROALBUMIN/CREAT UR: 9.03 MG/G CR (ref 0–25)
NONHDLC SERPL-MCNC: 161 MG/DL
POTASSIUM BLD-SCNC: 4.2 MMOL/L (ref 3.4–5.3)
SODIUM SERPL-SCNC: 138 MMOL/L (ref 133–144)
TRIGL SERPL-MCNC: 56 MG/DL
TSH SERPL DL<=0.005 MIU/L-ACNC: 3.63 MU/L (ref 0.4–4)

## 2022-08-29 ENCOUNTER — TELEPHONE (OUTPATIENT)
Dept: GASTROENTEROLOGY | Facility: CLINIC | Age: 62
End: 2022-08-29

## 2022-08-29 NOTE — TELEPHONE ENCOUNTER
Screening Questions    BlueKIND OF PREP RedLOCATION [review exclusion criteria] GreenSEDATION TYPE      1. Are you active on mychart? y    2. What insurance is in the chart? [employer]     3.   Ordering/Referring Provider: Sudhakar Ledezma APRN CNP       4. BMI   (If greater than 40 review exclusion criteria [PAC APPT IF [MAC] @ [facility])  25.2  [If yes, BMI OVER 40-EXTENDED PREP]      **(Sedation review/consideration needed)**  Do you have a legal guardian or Medical Power of    and/or are you able to give consent for your medical care?     Can give consent    5. Have you had a positive covid test in the last 90 days?   n -     6.  Are you currently on dialysis?   n [ If yes, G-PREP & HOSPITAL setting ONLY]     7.  Do you have chronic kidney disease?  n [ If yes, G-PREP ]    8.   Do you have a diagnosis of diabetes?   y   [ If yes, G-PREP ]    9.  On a regular basis do you go 3-5 days between bowel movements?   n   [ If yes, EXTENDED PREP]    10.  Are you taking any prescription pain medications on a routine schedule?    y - gabapetine [ If yes, EXTENDED PREP] [If yes, MAC]      11.   Do you have any chemical dependencies such as alcohol, street drugs, or methadone?    n [If yes, MAC]    12.   Do you have any history of post-traumatic stress syndrome, severe anxiety or history of psychosis?    y  [If yes, MAC]    13.  [FEMALES] Are you currently pregnant? n    If yes, how many weeks?       Respiratory/Heart Screening:  [If yes to any of the following HOSPITAL setting only]     14. Do you have Pulmonary Hypertension [Lungs]?   n       15. Do you have UNCONTROLLED asthma?   n     16.  Do you use daily home oxygen?  n      17. Do you have mod to severe Obstructive Sleep Apnea?         (OKAY @ [facility]  [facility]  SH  PH  RI  MG - if pt is not on OXYGEN)  n      18.   Have you had a heart or lung transplant?   n      19.   Have you had a stroke or Transient ischemic attack (TIA - aka  mini stroke ) within 6 months?  (If yes,  please review exclusion criteria)  n     20.   In the past 6 months, have you had any heart related issues including cardiomyopathy or heart attack?   n           If yes, did it require cardiac stenting or other implantable device?         21.   Do you have any implantable devices in your body (pacemaker, defib, LVAD)? (If yes, please review exclusion criteria)  n   22.  Do you take the medication Phentermine?     Yes-> Hold for 7 days before procedure.  Please consult your prescribing provider if you have questions about holding this medication.     No-> Continue to next question.    23. Do you take nitroglycerin?   n           If yes, how often?   (if yes, HOSPITAL setting ONLY)    24.  Are you currently taking any blood thinners?    [If yes, INFORM patient to follw up w/ ORDERING PROVIDER FOR BRIDGING INSTRUCTIONS]     n    25.   Do you transfer independently?                (If NO, please HOSPITAL setting ONLY)  y    26.   Preferred LOCAL Pharmacy for Pre Prescription:      [id number] - [place], [place] - [address] AT [facility] & [address]    Scheduling Details  (Please ask for phone number if not scheduled by patient)      Caller : Lenore Martinez    Date of Procedure: 11/4  Surgeon: Jr MACIEL  Location: [facility]        Sedation Type: MAC l per order and pain meds  Conscious Sedation- Needs  for 6 hours after the procedure  MAC/General-Needs  for 24 hours after procedure    n :[Pre-op Required] at [facility]  SH  MG and OR for MAC sedation   (advise patient they will need a pre-op WITH IN 30 DAYS of procedure date)     Type of Procedure Scheduled:   Lower Endoscopy [Colonoscopy]    Which Colonoscopy Prep was Sent?:   Extended - pain meds    SIVA CF PATIENTS & GROEN'S PATIENTS NEEDS EXTENDED PREP       Informed patient they will need an adult  y  Cannot take any type of public or medical transportation alone    Pre-Procedure Covid test to be completed at [facility]  Clinics or Externally: home test  **INFORMED OF HOME TESTING & LAB OPTION**        Confirmed Nurse will call to complete assessment y    Additional comments:

## 2022-09-23 ENCOUNTER — ANCILLARY PROCEDURE (OUTPATIENT)
Dept: MAMMOGRAPHY | Facility: CLINIC | Age: 62
End: 2022-09-23
Attending: FAMILY MEDICINE
Payer: COMMERCIAL

## 2022-09-23 DIAGNOSIS — Z12.31 VISIT FOR SCREENING MAMMOGRAM: ICD-10-CM

## 2022-09-23 PROCEDURE — 77067 SCR MAMMO BI INCL CAD: CPT | Mod: TC | Performed by: RADIOLOGY

## 2022-10-25 ENCOUNTER — LAB (OUTPATIENT)
Dept: LAB | Facility: HOSPITAL | Age: 62
End: 2022-10-25
Payer: COMMERCIAL

## 2022-10-25 ENCOUNTER — OFFICE VISIT (OUTPATIENT)
Dept: NEUROLOGY | Facility: CLINIC | Age: 62
End: 2022-10-25
Payer: COMMERCIAL

## 2022-10-25 VITALS
BODY MASS INDEX: 24.66 KG/M2 | DIASTOLIC BLOOD PRESSURE: 54 MMHG | HEIGHT: 65 IN | WEIGHT: 148 LBS | HEART RATE: 74 BPM | SYSTOLIC BLOOD PRESSURE: 95 MMHG

## 2022-10-25 DIAGNOSIS — E08.42 DIABETIC POLYNEUROPATHY ASSOCIATED WITH DIABETES MELLITUS DUE TO UNDERLYING CONDITION (H): ICD-10-CM

## 2022-10-25 DIAGNOSIS — E08.42 DIABETIC POLYNEUROPATHY ASSOCIATED WITH DIABETES MELLITUS DUE TO UNDERLYING CONDITION (H): Primary | ICD-10-CM

## 2022-10-25 LAB
ALBUMIN SERPL BCG-MCNC: 4.7 G/DL (ref 3.5–5.2)
ALP SERPL-CCNC: 77 U/L (ref 35–104)
ALT SERPL W P-5'-P-CCNC: 14 U/L (ref 10–35)
AST SERPL W P-5'-P-CCNC: 15 U/L (ref 10–35)
BILIRUB DIRECT SERPL-MCNC: <0.2 MG/DL (ref 0–0.3)
BILIRUB SERPL-MCNC: 0.7 MG/DL
PROT SERPL-MCNC: 6.6 G/DL (ref 6.4–8.3)

## 2022-10-25 PROCEDURE — 36415 COLL VENOUS BLD VENIPUNCTURE: CPT

## 2022-10-25 PROCEDURE — 99214 OFFICE O/P EST MOD 30 MIN: CPT | Performed by: PSYCHIATRY & NEUROLOGY

## 2022-10-25 PROCEDURE — 80171 DRUG SCREEN QUANT GABAPENTIN: CPT

## 2022-10-25 PROCEDURE — 80076 HEPATIC FUNCTION PANEL: CPT

## 2022-10-25 RX ORDER — ONDANSETRON 4 MG/1
TABLET, ORALLY DISINTEGRATING ORAL
COMMUNITY
Start: 2022-06-10 | End: 2023-06-08

## 2022-10-25 RX ORDER — GABAPENTIN 600 MG/1
TABLET ORAL
Qty: 180 TABLET | Refills: 11 | Status: SHIPPED | OUTPATIENT
Start: 2022-10-25 | End: 2023-10-25

## 2022-10-25 NOTE — PROGRESS NOTES
NEUROLOGY FOLLOW UP VISIT  NOTE       [facility]UR[facility] [place]  [address] [place] [address]  Tel: [phone]  Fax: [phone]  [email or web address]     Lenore Martinez,  1960, MRN [id number]  PCP: Sudhakar Ledezma  Date: 10/25/2022      ASSESSMENT & PLAN     Visit Diagnosis  1. Diabetic polyneuropathy associated with diabetes mellitus due to underlying condition (H)     Diabetic polyneuropathy  62-year-old female with bipolar disorder, PTSD who is been followed in our clinic for diabetic polyneuropathy.  She had a EMG during her last visit that confirms length dependent mixed sensorimotor polyneuropathy.  Lab work for common causes of neuropathy was normal.  Most recent hemoglobin A1c was 6.1.  She is having increased neuropathic symptoms and I have recommended:    1.  Increase gabapentin to 1200 mg 3 times daily  2.  Check gabapentin level and hepatic profile  3.  She was encouraged to have tighter glycemic control  4.  Follow-up will be in 1 year    Thank you again for this referral, please feel free to contact me if you have any questions.    Kedar Conn MD  [facility]UR[facility], [place]  (Formerly, [facility]     HISTORY OF PRESENT ILLNESS     Patient is a 62-year-old female with history of bipolar disorder, PTSD who is been followed in our clinic for diabetic polyneuropathy.  She was last seen on 10/26/2021 when she had an EMG that was consistent with length dependent mixed sensorimotor polyneuropathy.  She also had lab work that included normal folate, B1, B6, B12 and a gabapentin level.  His A1c was somewhat elevated.  She was continued on gabapentin 1200 mg twice daily and was told to have a tighter glycemic control.  She does admit she likes to eat cookies and in the past getting her blood sugar in control was problematic but she has given up most of carbs and sweet stuff and is exercising.  She does experience some burning  tingling sensation and has been experimenting by increasing the dose of gabapentin and is wondering if she can increase the dose of gabapentin to 1200 mg 3 times daily    Patient also has history of migraine headache usually preceded by an aura that are easily aborted by Excedrin.  For her bipolar disorder and PTSD she takes lamotrigine and Celexa.     PROBLEM LIST   Patient Active Problem List   Diagnosis Code     PTSD (post-traumatic stress disorder) F43.10     Hypovitaminosis D E55.9     Migraine with aura  G43.109     Bipolar disease, chronic (H) F31.9     Diabetic neuropathy (H) E11.40     Hyperlipidemia LDL goal <70 E78.5         PAST MEDICAL & SURGICAL HISTORY     Past Medical History:   Patient  has a past medical history of Anxiety, Bipolar disease, chronic (H), Diabetic neuropathy (H) (06/03/2021), Hyperlipidemia LDL goal <70, Hypovitaminosis D (05/12/2014), Iron deficiency, Migraines, Neuropathy, and PTSD (post-traumatic stress disorder) (04/16/2013).    Surgical History:  She  has a past surgical history that includes hysterectomy, pap no longer indicated (10/1/2009); REMOVAL OF OVARIAN CYST(S) (1987); TOOTH EXTRACTION W/FORCEP (1987); and Myringotomy.     SOCIAL HISTORY     Reviewed, and she  reports that she has never smoked. She has never used smokeless tobacco. She reports current alcohol use. She reports that she does not use drugs.     FAMILY HISTORY     Reviewed, and family history includes Bipolar Disorder in her son; Cerebrovascular Disease in her maternal grandfather; Diabetes in her paternal grandmother and son; Hyperlipidemia in her mother; Mental Illness in her brother, brother, mother, paternal grandmother, sister, and son; Substance Abuse in her brother and brother; Thyroid Disease in her son.     ALLERGIES     Allergies   Allergen Reactions     Compazine      Anxiety           REVIEW OF SYSTEMS     A 12 point review of system was performed and was negative except as outlined in the  "history of present illness.     HOME MEDICATIONS     Current Outpatient Rx   Medication Sig Dispense Refill     albuterol (PROAIR HFA/PROVENTIL HFA/VENTOLIN HFA) 108 (90 Base) MCG/ACT inhaler Inhale 2 puffs into the lungs every 4 hours as needed for shortness of breath / dyspnea or wheezing Via spacer 8.5 g 1     Aspirin-Acetaminophen-Caffeine (EXCEDRIN MIGRAINE PO) Take  by mouth as needed.       cetirizine (ZYRTEC) 10 MG tablet Take 1 tablet (10 mg) by mouth daily 30 tablet 1     citalopram (CELEXA) 20 MG tablet Take 20 mg by mouth daily       citalopram (CELEXA) 40 MG tablet Take 40 mg by mouth daily       Digestive Enzymes (DIGESTIVE ENZYME PO) [employer] digestive enzymes       gabapentin (NEURONTIN) 600 MG tablet TAKE 2 TABLETS(1200 MG) BY MOUTH TWICE DAILY 120 tablet 11     lamoTRIgine (LAMICTAL) 100 MG tablet Take 100 mg by mouth 3 times daily.       ondansetron (ZOFRAN ODT) 4 MG ODT tab        Specialty Vitamins Products (BRAIN PO) [employer] dynamic brain supplement       Vitamin D, Cholecalciferol, 1000 units TABS Take 1,000 mg by mouth daily           PHYSICAL EXAM     Vital signs  BP 95/54 (BP Location: Right arm, Patient Position: Sitting)   Pulse 74   Ht 1.651 m (5' 5\")   Wt 67.1 kg (148 lb)   BMI 24.63 kg/m      Weight:   148 lbs 0 oz    Patient is alert and oriented x4 in no acute distress. Vital signs were reviewed and are documented in electronic medical record. Neck was supple, no carotid bruits, thyromegaly, JVD, or lymphadenopathy was noted.   NEUROLOGY EXAM:    Patient s speech was normal with no aphasia or dysarthria. Mentation, and affect were also normal.     Funduscopic exam was normal, with normal cup to disc ratio. Cranial nerves II -XII were intact.     Patient had normal mass, tone and motor strength was 5/5 in all extremities without pronator drift.     Sensation was decreased to light touch pinprick below knees    Reflexes were 1+ upper extremity absent in the " lower    No dysmetria noted on FNF or HKS. Romberg was negative.    Gait testing was normal.      PERTINENT DIAGNOSTIC STUDIES     Following studies were reviewed:     MRI BRAIN 7/9/2019  1.  No evidence of acute intracranial hemorrhage, mass effect, or infarction.  2.  Mild brain parenchymal volume loss.     EMG 10/26/2021  This is a borderline abnormal EEG that suggests length dependent mixed sensorimotor polyneuropathy.  This likely is due to patient's known prediabetes, clinical correlation is recommended.     EMG 8/21/2018  This is a normal study.  There is no electrophysiological evidence of a diffuse neuropathy, myopathy, or radiculpathy to affect the tested extremity muscles.      EMG 2/8/2011  This EMG nerve conduction study showed borderline reduced amplitude for the sural potentials, compatible with a very mild electrophysiological sensory neuropathy of the lower extremities.  Clinical correlation is recommended     PERTINENT LABS  Following labs were reviewed:  Office Visit on 08/26/2022   Component Date Value     Creatinine Urine mg/dL 08/26/2022 155      Albumin Urine mg/L 08/26/2022 14      Albumin Urine mg/g Cr 08/26/2022 9.03      Sodium 08/26/2022 138      Potassium 08/26/2022 4.2      Chloride 08/26/2022 107      Carbon Dioxide (CO2) 08/26/2022 26      Anion Gap 08/26/2022 5      Urea Nitrogen 08/26/2022 13      Creatinine 08/26/2022 0.85      Calcium 08/26/2022 9.2      Glucose 08/26/2022 104 (H)      GFR Estimate 08/26/2022 78      Cholesterol 08/26/2022 226 (H)      Triglycerides 08/26/2022 56      Direct Measure HDL 08/26/2022 65      LDL Cholesterol Calculat* 08/26/2022 150 (H)      Non HDL Cholesterol 08/26/2022 161 (H)      Patient Fasting > 8hrs? 08/26/2022 No      Hemoglobin A1C 08/26/2022 5.5      TSH 08/26/2022 3.63          Total time spent for face to face visit, reviewing labs/imaging studies, counseling and coordination of care was: 30 Minutes spent on the date of the encounter  doing chart review, review of outside records, review of test results, interpretation of tests, patient visit and documentation       This note was dictated using voice recognition software.  Any grammatical or context distortions are unintentional and inherent to the software.    No orders of the defined types were placed in this encounter.     New Prescriptions    No medications on file     Modified Medications    No medications on file

## 2022-10-25 NOTE — LETTER
10/25/2022         RE: Lenore Martinez  [address]  [place] [address]        Dear Colleague,    Thank you for referring your patient, Lenore Martinez, to the [facility] [place]. Please see a copy of my visit note below.    NEUROLOGY FOLLOW UP VISIT  NOTE       [facility] [place]  [address] [place] [address]  Tel: [phone]  Fax: [phone]  [email or web address]     Lenore Martinez,  1960, MRN [id number]  PCP: Sudhakar Ledezma  Date: 10/25/2022      ASSESSMENT & PLAN     Visit Diagnosis  1. Diabetic polyneuropathy associated with diabetes mellitus due to underlying condition (H)     Diabetic polyneuropathy  62-year-old female with bipolar disorder, PTSD who is been followed in our clinic for diabetic polyneuropathy.  She had a EMG during her last visit that confirms length dependent mixed sensorimotor polyneuropathy.  Lab work for common causes of neuropathy was normal.  Most recent hemoglobin A1c was 6.1.  She is having increased neuropathic symptoms and I have recommended:    1.  Increase gabapentin to 1200 mg 3 times daily  2.  Check gabapentin level and hepatic profile  3.  She was encouraged to have tighter glycemic control  4.  Follow-up will be in 1 year    Thank you again for this referral, please feel free to contact me if you have any questions.    Kedar Conn MD  [facility]  (Former[facility], [facility]     HISTORY OF PRESENT ILLNESS     Patient is a 62-year-old female with history of bipolar disorder, PTSD who is been followed in our clinic for diabetic polyneuropathy.  She was last seen on 10/26/2021 when she had an EMG that was consistent with length dependent mixed sensorimotor polyneuropathy.  She also had lab work that included normal folate, B1, B6, B12 and a gabapentin level.  His A1c was somewhat elevated.  She was continued on gabapentin 1200 mg twice daily  and was told to have a tighter glycemic control.  She does admit she likes to eat cookies and in the past getting her blood sugar in control was problematic but she has given up most of carbs and sweet stuff and is exercising.  She does experience some burning tingling sensation and has been experimenting by increasing the dose of gabapentin and is wondering if she can increase the dose of gabapentin to 1200 mg 3 times daily    Patient also has history of migraine headache usually preceded by an aura that are easily aborted by Excedrin.  For her bipolar disorder and PTSD she takes lamotrigine and Celexa.     PROBLEM LIST   Patient Active Problem List   Diagnosis Code     PTSD (post-traumatic stress disorder) F43.10     Hypovitaminosis D E55.9     Migraine with aura  G43.109     Bipolar disease, chronic (H) F31.9     Diabetic neuropathy (H) E11.40     Hyperlipidemia LDL goal <70 E78.5         PAST MEDICAL & SURGICAL HISTORY     Past Medical History:   Patient  has a past medical history of Anxiety, Bipolar disease, chronic (H), Diabetic neuropathy (H) (06/03/2021), Hyperlipidemia LDL goal <70, Hypovitaminosis D (05/12/2014), Iron deficiency, Migraines, Neuropathy, and PTSD (post-traumatic stress disorder) (04/16/2013).    Surgical History:  She  has a past surgical history that includes hysterectomy, pap no longer indicated (10/1/2009); REMOVAL OF OVARIAN CYST(S) (1987); TOOTH EXTRACTION W/FORCEP (1987); and Myringotomy.     SOCIAL HISTORY     Reviewed, and she  reports that she has never smoked. She has never used smokeless tobacco. She reports current alcohol use. She reports that she does not use drugs.     FAMILY HISTORY     Reviewed, and family history includes Bipolar Disorder in her son; Cerebrovascular Disease in her maternal grandfather; Diabetes in her paternal grandmother and son; Hyperlipidemia in her mother; Mental Illness in her brother, brother, mother, paternal grandmother, sister, and son; Substance  "Abuse in her brother and brother; Thyroid Disease in her son.     ALLERGIES     Allergies   Allergen Reactions     Compazine      Anxiety           REVIEW OF SYSTEMS     A 12 point review of system was performed and was negative except as outlined in the history of present illness.     HOME MEDICATIONS     Current Outpatient Rx   Medication Sig Dispense Refill     albuterol (PROAIR HFA/PROVENTIL HFA/VENTOLIN HFA) 108 (90 Base) MCG/ACT inhaler Inhale 2 puffs into the lungs every 4 hours as needed for shortness of breath / dyspnea or wheezing Via spacer 8.5 g 1     Aspirin-Acetaminophen-Caffeine (EXCEDRIN MIGRAINE PO) Take  by mouth as needed.       cetirizine (ZYRTEC) 10 MG tablet Take 1 tablet (10 mg) by mouth daily 30 tablet 1     citalopram (CELEXA) 20 MG tablet Take 20 mg by mouth daily       citalopram (CELEXA) 40 MG tablet Take 40 mg by mouth daily       Digestive Enzymes (DIGESTIVE ENZYME PO) [employer] digestive enzymes       gabapentin (NEURONTIN) 600 MG tablet TAKE 2 TABLETS(1200 MG) BY MOUTH TWICE DAILY 120 tablet 11     lamoTRIgine (LAMICTAL) 100 MG tablet Take 100 mg by mouth 3 times daily.       ondansetron (ZOFRAN ODT) 4 MG ODT tab        Specialty Vitamins Products (BRAIN PO) [employer] dynamic brain supplement       Vitamin D, Cholecalciferol, 1000 units TABS Take 1,000 mg by mouth daily           PHYSICAL EXAM     Vital signs  BP 95/54 (BP Location: Right arm, Patient Position: Sitting)   Pulse 74   Ht 1.651 m (5' 5\")   Wt 67.1 kg (148 lb)   BMI 24.63 kg/m      Weight:   148 lbs 0 oz    Patient is alert and oriented x4 in no acute distress. Vital signs were reviewed and are documented in electronic medical record. Neck was supple, no carotid bruits, thyromegaly, JVD, or lymphadenopathy was noted.   NEUROLOGY EXAM:    Patient s speech was normal with no aphasia or dysarthria. Mentation, and affect were also normal.     Funduscopic exam was normal, with normal cup to disc ratio. " Cranial nerves II -XII were intact.     Patient had normal mass, tone and motor strength was 5/5 in all extremities without pronator drift.     Sensation was decreased to light touch pinprick below knees    Reflexes were 1+ upper extremity absent in the lower    No dysmetria noted on FNF or HKS. Romberg was negative.    Gait testing was normal.      PERTINENT DIAGNOSTIC STUDIES     Following studies were reviewed:     MRI BRAIN 7/9/2019  1.  No evidence of acute intracranial hemorrhage, mass effect, or infarction.  2.  Mild brain parenchymal volume loss.     EMG 10/26/2021  This is a borderline abnormal EEG that suggests length dependent mixed sensorimotor polyneuropathy.  This likely is due to patient's known prediabetes, clinical correlation is recommended.     EMG 8/21/2018  This is a normal study.  There is no electrophysiological evidence of a diffuse neuropathy, myopathy, or radiculpathy to affect the tested extremity muscles.      EMG 2/8/2011  This EMG nerve conduction study showed borderline reduced amplitude for the sural potentials, compatible with a very mild electrophysiological sensory neuropathy of the lower extremities.  Clinical correlation is recommended     PERTINENT LABS  Following labs were reviewed:  Office Visit on 08/26/2022   Component Date Value     Creatinine Urine mg/dL 08/26/2022 155      Albumin Urine mg/L 08/26/2022 14      Albumin Urine mg/g Cr 08/26/2022 9.03      Sodium 08/26/2022 138      Potassium 08/26/2022 4.2      Chloride 08/26/2022 107      Carbon Dioxide (CO2) 08/26/2022 26      Anion Gap 08/26/2022 5      Urea Nitrogen 08/26/2022 13      Creatinine 08/26/2022 0.85      Calcium 08/26/2022 9.2      Glucose 08/26/2022 104 (H)      GFR Estimate 08/26/2022 78      Cholesterol 08/26/2022 226 (H)      Triglycerides 08/26/2022 56      Direct Measure HDL 08/26/2022 65      LDL Cholesterol Calculat* 08/26/2022 150 (H)      Non HDL Cholesterol 08/26/2022 161 (H)      Patient Fasting >  8hrs? 08/26/2022 No      Hemoglobin A1C 08/26/2022 5.5      TSH 08/26/2022 3.63          Total time spent for face to face visit, reviewing labs/imaging studies, counseling and coordination of care was: 30 Minutes spent on the date of the encounter doing chart review, review of outside records, review of test results, interpretation of tests, patient visit and documentation       This note was dictated using voice recognition software.  Any grammatical or context distortions are unintentional and inherent to the software.    No orders of the defined types were placed in this encounter.     New Prescriptions    No medications on file     Modified Medications    No medications on file                     Again, thank you for allowing me to participate in the care of your patient.        Sincerely,        Kedar Conn MD

## 2022-10-25 NOTE — NURSING NOTE
Chief Complaint   Patient presents with     NEUROPATHY     Annual follow up- would like to discuss medication increase     Jud Collier CMA on 10/25/2022 at 11:55 AM

## 2022-10-28 ENCOUNTER — TELEPHONE (OUTPATIENT)
Dept: GASTROENTEROLOGY | Facility: CLINIC | Age: 62
End: 2022-10-28

## 2022-10-28 DIAGNOSIS — Z12.11 ENCOUNTER FOR SCREENING COLONOSCOPY: Primary | ICD-10-CM

## 2022-10-28 LAB — GABAPENTIN SERPLBLD-MCNC: 8.3 UG/ML

## 2022-10-28 RX ORDER — BISACODYL 5 MG/1
TABLET, DELAYED RELEASE ORAL
Qty: 4 TABLET | Refills: 0 | Status: SHIPPED | OUTPATIENT
Start: 2022-10-28 | End: 2023-06-08

## 2022-10-28 NOTE — TELEPHONE ENCOUNTER
Pre assessment questions completed for upcoming colonoscopy procedure scheduled on 11.4.2022    COVID policy reviewed. Patient to complete rapid antigen test one to two days before their scheduled procedure. Patient to bring photo of the results when they come in for their procedure.    Reviewed procedural arrival time 1230 and facility location [facility].    Designated  policy reviewed. Instructed to have someone stay 24 hours post procedure.     Anticoagulation/blood thinners? no    Electronic implanted devices? no    Reviewed Golytely prep instructions with patient. No fiber/iron supplements or foods that contain nuts/seeds prior to procedure.     Prep instructions sent via [employer].  Prep prescription sent to [id number] - [place] - [address] AT Count includes the [facility] & [place]    Patient verbalized understanding and had no questions or concerns at this time.    Lakia Cedeno RN

## 2022-11-04 ENCOUNTER — ANESTHESIA EVENT (OUTPATIENT)
Dept: SURGERY | Facility: AMBULATORY SURGERY CENTER | Age: 62
End: 2022-11-04
Payer: COMMERCIAL

## 2022-11-04 ENCOUNTER — HOSPITAL ENCOUNTER (OUTPATIENT)
Facility: AMBULATORY SURGERY CENTER | Age: 62
Discharge: HOME | End: 2022-11-04
Attending: INTERNAL MEDICINE
Payer: COMMERCIAL

## 2022-11-04 ENCOUNTER — ANESTHESIA (OUTPATIENT)
Dept: SURGERY | Facility: AMBULATORY SURGERY CENTER | Age: 62
End: 2022-11-04
Payer: COMMERCIAL

## 2022-11-04 VITALS
BODY MASS INDEX: 24.66 KG/M2 | DIASTOLIC BLOOD PRESSURE: 65 MMHG | SYSTOLIC BLOOD PRESSURE: 103 MMHG | WEIGHT: 148 LBS | RESPIRATION RATE: 14 BRPM | HEART RATE: 70 BPM | OXYGEN SATURATION: 97 % | TEMPERATURE: 98.2 F | HEIGHT: 65 IN

## 2022-11-04 VITALS — HEART RATE: 72 BPM

## 2022-11-04 LAB
COLONOSCOPY: NORMAL
GLUCOSE BLDC GLUCOMTR-MCNC: 102 MG/DL (ref 70–99)

## 2022-11-04 PROCEDURE — 82962 GLUCOSE BLOOD TEST: CPT | Performed by: PATHOLOGY

## 2022-11-04 PROCEDURE — 88305 TISSUE EXAM BY PATHOLOGIST: CPT | Mod: 26 | Performed by: STUDENT IN AN ORGANIZED HEALTH CARE EDUCATION/TRAINING PROGRAM

## 2022-11-04 PROCEDURE — 88305 TISSUE EXAM BY PATHOLOGIST: CPT | Mod: TC | Performed by: INTERNAL MEDICINE

## 2022-11-04 PROCEDURE — 45390 COLONOSCOPY W/RESECTION: CPT | Mod: 33

## 2022-11-04 RX ORDER — NALOXONE HYDROCHLORIDE 0.4 MG/ML
0.2 INJECTION, SOLUTION INTRAMUSCULAR; INTRAVENOUS; SUBCUTANEOUS
Status: DISCONTINUED | OUTPATIENT
Start: 2022-11-04 | End: 2022-11-05 | Stop reason: HOSPADM

## 2022-11-04 RX ORDER — ONDANSETRON 2 MG/ML
4 INJECTION INTRAMUSCULAR; INTRAVENOUS EVERY 6 HOURS PRN
Status: DISCONTINUED | OUTPATIENT
Start: 2022-11-04 | End: 2022-11-05 | Stop reason: HOSPADM

## 2022-11-04 RX ORDER — NALOXONE HYDROCHLORIDE 0.4 MG/ML
0.4 INJECTION, SOLUTION INTRAMUSCULAR; INTRAVENOUS; SUBCUTANEOUS
Status: DISCONTINUED | OUTPATIENT
Start: 2022-11-04 | End: 2022-11-05 | Stop reason: HOSPADM

## 2022-11-04 RX ORDER — ONDANSETRON 2 MG/ML
4 INJECTION INTRAMUSCULAR; INTRAVENOUS
Status: DISCONTINUED | OUTPATIENT
Start: 2022-11-04 | End: 2022-11-04 | Stop reason: HOSPADM

## 2022-11-04 RX ORDER — PROPOFOL 10 MG/ML
INJECTION, EMULSION INTRAVENOUS PRN
Status: DISCONTINUED | OUTPATIENT
Start: 2022-11-04 | End: 2022-11-04

## 2022-11-04 RX ORDER — LIDOCAINE 40 MG/G
CREAM TOPICAL
Status: DISCONTINUED | OUTPATIENT
Start: 2022-11-04 | End: 2022-11-04 | Stop reason: HOSPADM

## 2022-11-04 RX ORDER — ONDANSETRON 4 MG/1
4 TABLET, ORALLY DISINTEGRATING ORAL EVERY 6 HOURS PRN
Status: DISCONTINUED | OUTPATIENT
Start: 2022-11-04 | End: 2022-11-05 | Stop reason: HOSPADM

## 2022-11-04 RX ORDER — PROPOFOL 10 MG/ML
INJECTION, EMULSION INTRAVENOUS CONTINUOUS PRN
Status: DISCONTINUED | OUTPATIENT
Start: 2022-11-04 | End: 2022-11-04

## 2022-11-04 RX ORDER — FLUMAZENIL 0.1 MG/ML
0.2 INJECTION, SOLUTION INTRAVENOUS
Status: DISCONTINUED | OUTPATIENT
Start: 2022-11-04 | End: 2022-11-05 | Stop reason: HOSPADM

## 2022-11-04 RX ORDER — SODIUM CHLORIDE, SODIUM LACTATE, POTASSIUM CHLORIDE, CALCIUM CHLORIDE 600; 310; 30; 20 MG/100ML; MG/100ML; MG/100ML; MG/100ML
INJECTION, SOLUTION INTRAVENOUS CONTINUOUS
Status: DISCONTINUED | OUTPATIENT
Start: 2022-11-04 | End: 2022-11-04 | Stop reason: HOSPADM

## 2022-11-04 RX ADMIN — SODIUM CHLORIDE, SODIUM LACTATE, POTASSIUM CHLORIDE, CALCIUM CHLORIDE: 600; 310; 30; 20 INJECTION, SOLUTION INTRAVENOUS at 13:28

## 2022-11-04 RX ADMIN — PROPOFOL 200 MCG/KG/MIN: 10 INJECTION, EMULSION INTRAVENOUS at 13:48

## 2022-11-04 RX ADMIN — PROPOFOL 60 MG: 10 INJECTION, EMULSION INTRAVENOUS at 13:48

## 2022-11-04 NOTE — H&P
Lenore Martinez  [id number]  female  62 year old      Reason for procedure/surgery: screening colonoscopy abdominal pain constipation       Patient Active Problem List   Diagnosis     PTSD (post-traumatic stress disorder)     Hypovitaminosis D     Migraine with aura      Bipolar disease, chronic (H)     Diabetic neuropathy (H)     Hyperlipidemia LDL goal <70       Past Surgical History:    Past Surgical History:   Procedure Laterality Date     HC REMOVAL OF OVARIAN CYST(S)       HC TOOTH EXTRACTION W/FORCEP       HYSTERECTOMY, PAP NO LONGER INDICATED  10/1/2009    has ovaries     MYRINGOTOMY      as  a child       Past Medical History:   Past Medical History:   Diagnosis Date     Anxiety      Bipolar disease, chronic (H)      Diabetic neuropathy (H) 2021     Hyperlipidemia LDL goal <70      Hypovitaminosis D 2014     Iron deficiency      Migraines      Neuropathy      PTSD (post-traumatic stress disorder) 2013    Diagnosed 2005       Social History:   Social History     Tobacco Use     Smoking status: Never     Smokeless tobacco: Never     Tobacco comments:     smoke free household.   Substance Use Topics     Alcohol use: Yes     Comment: 1-2 a month       Family History:   Family History   Problem Relation Age of Onset     Diabetes Paternal Grandmother         Type2     Mental Illness Paternal Grandmother      Diabetes Son         Type1     Mental Illness Son         Bipolar     Thyroid Disease Son      Bipolar Disorder Son      Hyperlipidemia Mother              Mental Illness Mother      Cerebrovascular Disease Maternal Grandfather      Mental Illness Sister         Bipolar     Mental Illness Brother         Schizophrenic     Substance Abuse Brother      Mental Illness Brother         Schizophrenic Shot himself     Substance Abuse Brother          under drug inducement     Coronary Artery Disease No family hx of      Cancer No family hx of        Allergies:   Allergies    Allergen Reactions     Compazine      Anxiety         Active Medications:   Current Outpatient Medications   Medication Sig Dispense Refill     albuterol (PROAIR HFA/PROVENTIL HFA/VENTOLIN HFA) 108 (90 Base) MCG/ACT inhaler Inhale 2 puffs into the lungs every 4 hours as needed for shortness of breath / dyspnea or wheezing Via spacer 8.5 g 1     Aspirin-Acetaminophen-Caffeine (EXCEDRIN MIGRAINE PO) Take  by mouth as needed.       bisacodyl (DULCOLAX) 5 MG EC tablet Take as directed. One day before exam take 2 tablets at 3 PM. Day of exam take 2 tablets at 6 AM. 4 tablet 0     cetirizine (ZYRTEC) 10 MG tablet Take 1 tablet (10 mg) by mouth daily 30 tablet 1     citalopram (CELEXA) 20 MG tablet Take 20 mg by mouth daily       citalopram (CELEXA) 40 MG tablet Take 40 mg by mouth daily       Digestive Enzymes (DIGESTIVE ENZYME PO) [employer] digestive enzymes       gabapentin (NEURONTIN) 600 MG tablet Take 2 tablets (1200 mg) by mouth 3 times daily 180 tablet 11     lamoTRIgine (LAMICTAL) 100 MG tablet Take 100 mg by mouth 3 times daily.       ondansetron (ZOFRAN ODT) 4 MG ODT tab        polyethylene glycol (GOLYTELY) 236 g suspension Take as directed. One day before exam fill the jug with water. Cover and shake until well mixed. At 6 PM start drinking an 8oz glass of mixture every 15 minutes until jug is 1/2 empty. Store remainder in the refrigerator. Day of exam at 6 AM Drink the other half of the Golytely jug. Drink one 8-ounce glass every 15 minutes until the jug is empty. You should finish the prep 4 hours before the exam. 4000 mL 0     Specialty Vitamins Products (BRAIN PO) [employer] dynamic brain supplement       Vitamin D, Cholecalciferol, 1000 units TABS Take 1,000 mg by mouth daily         Systemic Review:   CONSTITUTIONAL: NEGATIVE for fever, chills, change in weight  ENT/MOUTH: NEGATIVE for ear, mouth and throat problems  RESP: NEGATIVE for significant cough or SOB  CV: NEGATIVE for chest  "pain, palpitations or peripheral edema    Physical Examination:   Vital Signs: /69   Pulse 76   Temp 97.8  F (36.6  C) (Temporal)   Resp 18   Ht 1.651 m (5' 5\")   Wt 67.1 kg (148 lb)   SpO2 97%   BMI 24.63 kg/m    GENERAL: healthy, alert and no distress  NECK: no adenopathy, no asymmetry, masses, or scars  RESP: lungs clear to auscultation - no rales, rhonchi or wheezes  CV: regular rate and rhythm, normal S1 S2, no S3 or S4, no murmur, click or rub, no peripheral edema and peripheral pulses strong  ABDOMEN: soft, nontender, no hepatosplenomegaly, no masses and bowel sounds normal  MS: no gross musculoskeletal defects noted, no edema    ASA: 2    Mallampati Score: 2    Plan: Appropriate to proceed as scheduled.      Darien Norris MD  11/4/2022    PCP:  Sudhakar Ledezma    "

## 2022-11-04 NOTE — ANESTHESIA CARE TRANSFER NOTE
Patient: Lenore DIOR [place]less    Procedure: Procedure(s):  COLONOSCOPY, WITH POLYPECTOMY With Clips       Diagnosis: Screening for colon cancer [Z12.11]  Diagnosis Additional Information: No value filed.    Anesthesia Type:   MAC     Note:    Oropharynx: spontaneously breathing  Level of Consciousness: awake  Oxygen Supplementation: room air    Independent Airway: airway patency satisfactory and stable  Dentition: dentition unchanged  Vital Signs Stable: post-procedure vital signs reviewed and stable  Report to RN Given: handoff report given  Patient transferred to: Phase II    Handoff Report: Identifed the Patient, Identified the Reponsible Provider, Reviewed the pertinent medical history, Discussed the surgical course, Reviewed Intra-OP anesthesia mangement and issues during anesthesia, Set expectations for post-procedure period and Allowed opportunity for questions and acknowledgement of understanding      Vitals:  Vitals Value Taken Time   BP     Temp     Pulse 72 11/04/22 1420   Resp     SpO2         Electronically Signed By: ANIVAL Barakat CRNA  November 4, 2022  2:25 PM

## 2022-11-04 NOTE — ANESTHESIA PREPROCEDURE EVALUATION
Anesthesia Pre-Procedure Evaluation    Patient: Lenore Martinez   MRN: [id number] : 1960        Procedure : Procedure(s):  COLONOSCOPY          Past Medical History:   Diagnosis Date     Anxiety      Bipolar disease, chronic (H)      Diabetic neuropathy (H) 2021     Hyperlipidemia LDL goal <70      Hypovitaminosis D 2014     Iron deficiency      Migraines      Neuropathy      PTSD (post-traumatic stress disorder) 2013    Diagnosed 2005      Past Surgical History:   Procedure Laterality Date     HC REMOVAL OF OVARIAN CYST(S)       HC TOOTH EXTRACTION W/FORCEP       HYSTERECTOMY, PAP NO LONGER INDICATED  10/1/2009    has ovaries     MYRINGOTOMY      as  a child      Allergies   Allergen Reactions     Compazine      Anxiety        Social History     Tobacco Use     Smoking status: Never     Smokeless tobacco: Never     Tobacco comments:     smoke free household.   Substance Use Topics     Alcohol use: Yes     Comment: 1-2 a month      Wt Readings from Last 1 Encounters:   22 67.1 kg (148 lb)        Anesthesia Evaluation   Pt has had prior anesthetic.         ROS/MED HX  ENT/Pulmonary:       Neurologic:     (+) migraines,     Cardiovascular:     (+) Dyslipidemia ----- (-) murmur   METS/Exercise Tolerance: >4 METS    Hematologic:       Musculoskeletal:       GI/Hepatic:       Renal/Genitourinary:       Endo:     (+) type II DM, Diabetic complications: neuropathy.     Psychiatric/Substance Use:     (+) psychiatric history anxiety and bipolar     Infectious Disease:       Malignancy:       Other:            Physical Exam    Airway        Mallampati: II   TM distance: > 3 FB   Neck ROM: full   Mouth opening: > 3 cm    Respiratory Devices and Support         Dental  no notable dental history         Cardiovascular          Rhythm and rate: regular and normal (-) no murmur    Pulmonary   pulmonary exam normal        breath sounds clear to auscultation           OUTSIDE LABS:  CBC:    Lab Results   Component Value Date    WBC 5.1 03/12/2015    WBC 4.4 05/12/2014    HGB 13.3 03/12/2015    HGB 12.9 05/12/2014    HCT 40.5 03/12/2015    HCT 37.6 05/12/2014     03/12/2015     05/12/2014     BMP:   Lab Results   Component Value Date     08/26/2022     03/12/2015    POTASSIUM 4.2 08/26/2022    POTASSIUM 4.3 03/12/2015    CHLORIDE 107 08/26/2022    CHLORIDE 104 03/12/2015    CO2 26 08/26/2022    CO2 28 03/12/2015    BUN 13 08/26/2022    BUN 14 03/12/2015    CR 0.85 08/26/2022    CR 0.88 03/12/2015     (H) 08/26/2022    GLC 79 07/26/2018     COAGS: No results found for: PTT, INR, FIBR  POC: No results found for: BGM, HCG, HCGS  HEPATIC:   Lab Results   Component Value Date    ALBUMIN 4.7 10/25/2022    PROTTOTAL 6.6 10/25/2022    ALT 14 10/25/2022    AST 15 10/25/2022    ALKPHOS 77 10/25/2022    BILITOTAL 0.7 10/25/2022    BILIDIRECT 0.5 03/09/2012     OTHER:   Lab Results   Component Value Date    A1C 5.5 08/26/2022    GABRIEL 9.2 08/26/2022    TSH 3.63 08/26/2022    T4 0.81 05/12/2014       Anesthesia Plan    ASA Status:  2   NPO Status:  NPO Appropriate    Anesthesia Type: MAC.     - Reason for MAC: immobility needed   Induction: Intravenous, Propofol.   Maintenance: TIVA.        Consents    Anesthesia Plan(s) and associated risks, benefits, and realistic alternatives discussed. Questions answered and patient/representative(s) expressed understanding.    - Discussed:     - Discussed with:  Patient      - Extended Intubation/Ventilatory Support Discussed: No.      - Patient is DNR/DNI Status: No    Use of blood products discussed: No .     Postoperative Care    Pain management: IV analgesics.   PONV prophylaxis: Ondansetron (or other 5HT-3), Background Propofol Infusion     Comments:    Other Comments: Discussed plan for IV anesthetic with native airway. Discussed potential need for conversion to general anesthetic with airway management and potential for recall.               Jose Mancuso MD

## 2022-11-04 NOTE — ANESTHESIA POSTPROCEDURE EVALUATION
Patient: Lenore DIOR Corless    Procedure: Procedure(s):  COLONOSCOPY, WITH POLYPECTOMY With Clips       Anesthesia Type:  MAC    Note:  Disposition: Outpatient   Postop Pain Control: Uneventful            Sign Out: Well controlled pain   PONV: No   Neuro/Psych: Uneventful            Sign Out: Acceptable/Baseline neuro status   Airway/Respiratory: Uneventful            Sign Out: Acceptable/Baseline resp. status   CV/Hemodynamics: Uneventful            Sign Out: Acceptable CV status   Other NRE: NONE   DID A NON-ROUTINE EVENT OCCUR? No           Last vitals:  Vitals Value Taken Time   /65 11/04/22 1445   Temp 36.8  C (98.2  F) 11/04/22 1445   Pulse 70 11/04/22 1445   Resp 14 11/04/22 1445   SpO2 97 % 11/04/22 1445       Electronically Signed By: Tonny Nguyen MD  November 4, 2022  5:19 PM

## 2022-11-10 LAB
PATH REPORT.COMMENTS IMP SPEC: NORMAL
PATH REPORT.COMMENTS IMP SPEC: NORMAL
PATH REPORT.FINAL DX SPEC: NORMAL
PATH REPORT.GROSS SPEC: NORMAL
PATH REPORT.MICROSCOPIC SPEC OTHER STN: NORMAL
PATH REPORT.RELEVANT HX SPEC: NORMAL
PHOTO IMAGE: NORMAL

## 2022-11-15 ENCOUNTER — E-VISIT (OUTPATIENT)
Dept: FAMILY MEDICINE | Facility: CLINIC | Age: 62
End: 2022-11-15
Payer: COMMERCIAL

## 2022-11-15 DIAGNOSIS — M79.602 PAIN IN LEFT ARM: Primary | ICD-10-CM

## 2022-11-15 PROCEDURE — 99421 OL DIG E/M SVC 5-10 MIN: CPT

## 2022-11-16 DIAGNOSIS — D12.6 COLON ADENOMA: Primary | ICD-10-CM

## 2022-11-16 NOTE — PATIENT INSTRUCTIONS
Tod Grover,     I attached some information about common types of tendonitis in the arm/wrist. Typically rest, gentle stretching, ice/heat (whichever feels better for you - I recommend ice after activity and heat before activity), and ibuprofen as needed can help calm it down. It is important to identify what is triggering tendonitis, usually a new and repetitive activity involving the joint/limb that is affected. A hand/wrist splint that extends up to the thumb is often very helpful especially at night or during the identified triggering activity if it's something necessary to continue doing.     Please see the attachments for any warning signs/symptoms that would warrant urgent medical attention and additional information on taking NSAIDs like ibuprofen safely.     Thank you for choosing us for your care. If these conservative measures lead to no improvement or worsening symptoms I would like you to schedule a visit (in person or virtual would be fine) for additional evaluation about the nature of your pain.     You can schedule an appointment right here in [facility], or call [phone]      Thanks!    Sudhakar

## 2022-11-16 NOTE — TELEPHONE ENCOUNTER
Provider E-Visit time total (minutes): 10    Sent patient info on common types of upper extremity tendonitis and conservative measures to take at home. If these do not help, she should schedule a visit so we can discuss the nature of her pain in more detail.     See AVS.

## 2022-11-27 ENCOUNTER — HEALTH MAINTENANCE LETTER (OUTPATIENT)
Age: 62
End: 2022-11-27

## 2022-12-07 ENCOUNTER — TELEPHONE (OUTPATIENT)
Dept: FAMILY MEDICINE | Facility: CLINIC | Age: 62
End: 2022-12-07

## 2022-12-07 DIAGNOSIS — K21.9 GASTROESOPHAGEAL REFLUX DISEASE WITHOUT ESOPHAGITIS: Primary | ICD-10-CM

## 2022-12-07 NOTE — TELEPHONE ENCOUNTER
[facility] faxed a Refill Request    omeprazole (PRILOSEC) 40 MG DR capsule (Discontinued)      Last Written Prescription Date:  8/26/2022  Last Fill Quantity: 90 capsule,   # refills: 0  Last Office Visit: 8/26/2022 daniel Ledezma NP    Future Office visit:       Routing refill request to provider for review/approval because:  Drug not active on patient's medication list

## 2022-12-12 RX ORDER — OMEPRAZOLE 40 MG/1
40 CAPSULE, DELAYED RELEASE ORAL DAILY
Qty: 90 CAPSULE | Refills: 3 | Status: SHIPPED | OUTPATIENT
Start: 2022-12-12 | End: 2023-04-24

## 2022-12-12 NOTE — TELEPHONE ENCOUNTER
Sent Rx for protonix. Pt should schedule an office visit if her reflux/GERD symptoms worsen or are not well controlled with the medicaiton.

## 2022-12-13 NOTE — TELEPHONE ENCOUNTER
Called patient and she did not need a refill on the below medication. It was on auto refill at her pharmacy and she has since then contacted pharmacy and let them know she does not need this medication.  Patient is currently not having any reflux/gerd symptoms.    RENO Brizuela  [facility]

## 2022-12-25 ENCOUNTER — APPOINTMENT (OUTPATIENT)
Dept: CT IMAGING | Facility: CLINIC | Age: 62
End: 2022-12-25
Attending: EMERGENCY MEDICINE
Payer: COMMERCIAL

## 2022-12-25 ENCOUNTER — HOSPITAL ENCOUNTER (EMERGENCY)
Facility: CLINIC | Age: 62
Discharge: HOME | End: 2022-12-25
Attending: EMERGENCY MEDICINE | Admitting: EMERGENCY MEDICINE
Payer: COMMERCIAL

## 2022-12-25 VITALS
RESPIRATION RATE: 18 BRPM | HEART RATE: 70 BPM | DIASTOLIC BLOOD PRESSURE: 73 MMHG | BODY MASS INDEX: 24.63 KG/M2 | TEMPERATURE: 98.2 F | OXYGEN SATURATION: 98 % | HEIGHT: 65 IN | SYSTOLIC BLOOD PRESSURE: 118 MMHG

## 2022-12-25 DIAGNOSIS — S09.90XA INJURY OF HEAD, INITIAL ENCOUNTER: ICD-10-CM
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
## 2022-12-25 PROCEDURE — 70450 CT HEAD/BRAIN W/O DYE: CPT

## 2022-12-25 PROCEDURE — 99283 EMERGENCY DEPT VISIT LOW MDM: CPT | Performed by: EMERGENCY MEDICINE

## 2022-12-25 PROCEDURE — 70450 CT HEAD/BRAIN W/O DYE: CPT | Mod: 26 | Performed by: RADIOLOGY

## 2022-12-25 PROCEDURE — 72125 CT NECK SPINE W/O DYE: CPT | Mod: 26 | Performed by: RADIOLOGY

## 2022-12-25 PROCEDURE — 99284 EMERGENCY DEPT VISIT MOD MDM: CPT | Mod: 25 | Performed by: EMERGENCY MEDICINE

## 2022-12-25 PROCEDURE — 72125 CT NECK SPINE W/O DYE: CPT

## 2022-12-25 ASSESSMENT — ACTIVITIES OF DAILY LIVING (ADL): ADLS_ACUITY_SCORE: 35

## 2022-12-25 ASSESSMENT — ENCOUNTER SYMPTOMS: HEADACHES: 1
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
## 2022-12-29 ENCOUNTER — E-VISIT (OUTPATIENT)
Dept: URGENT CARE | Facility: CLINIC | Age: 62
End: 2022-12-29
Payer: COMMERCIAL

## 2022-12-29 DIAGNOSIS — N39.0 ACUTE UTI (URINARY TRACT INFECTION): Primary | ICD-10-CM

## 2022-12-29 PROCEDURE — 99421 OL DIG E/M SVC 5-10 MIN: CPT | Performed by: NURSE PRACTITIONER

## 2022-12-29 RX ORDER — NITROFURANTOIN 25; 75 MG/1; MG/1
100 CAPSULE ORAL 2 TIMES DAILY
Qty: 10 CAPSULE | Refills: 0 | Status: SHIPPED | OUTPATIENT
Start: 2022-12-29 | End: 2023-01-03

## 2022-12-29 NOTE — PATIENT INSTRUCTIONS
Queta Martinez    After reviewing your responses, I've been able to diagnose you with a urinary tract infection, which is a common infection of the bladder with bacteria.  This is not a sexually transmitted infection, though urinating immediately after intercourse can help prevent infections.  Drinking lots of fluids is also helpful to clear your current infection and prevent the next one.      I have sent a prescription for antibiotics to your pharmacy to treat this infection.    It is important that you take all of your prescribed medication even if your symptoms are improving after a few doses.  Taking all of your medicine helps prevent the symptoms from returning.     If your symptoms worsen, you develop pain in your back or stomach, develop fevers, or are not improving in 5 days, please contact your primary care provider for an appointment or visit any of our convenient Walk-in or Urgent Care Centers to be seen, which can be found on our website here.    Thanks again for choosing us as your health care partner,    ANIVAL Coreas CNP    Urinary Tract Infections in Women  Urinary tract infections (UTIs) are most often caused by bacteria. These bacteria enter the urinary tract. The bacteria may come from inside the body. Or they may travel from the skin outside the rectum or vagina into the urethra. Female anatomy makes it easy for bacteria from the bowel to enter a woman s urinary tract, which is the most common source of UTI. This means women develop UTIs more often than men. Pain in or around the urinary tract is a common UTI symptom. But the only way to know for sure if you have a UTI for the healthcare provider to test your urine. The two tests that may be done are the urinalysis and urine culture.     Types of UTIs    Cystitis. A bladder infection (cystitis) is the most common UTI in women. You may have urgent or frequent need to pee. You may also have pain, burning when you pee, and bloody  urine.    Urethritis. This is an inflamed urethra, which is the tube that carries urine from the bladder to outside the body. You may have lower stomach or back pain. You may also have urgent or frequent need to pee.    Pyelonephritis. This is a kidney infection. If not treated, it can be serious and damage your kidneys. In severe cases, you may need to stay in the hospital. You may have a fever and lower back pain.    Medicines to treat a UTI  Most UTIs are treated with antibiotics. These kill the bacteria. The length of time you need to take them depends on the type of infection. It may be as short as 3 days. If you have repeated UTIs, you may need a low-dose antibiotic for several months. Take antibiotics exactly as directed. Don t stop taking them until all of the medicine is gone. If you stop taking the antibiotic too soon, the infection may not go away. You may also develop a resistance to the antibiotic. This can make it much harder to treat.   Lifestyle changes to treat and prevent UTIs   The lifestyle changes below will help get rid of your UTI. They may also help prevent future UTIs.     Drink plenty of fluids. This includes water, juice, or other caffeine-free drinks. Fluids help flush bacteria out of your body.    Empty your bladder. Always empty your bladder when you feel the urge to pee. And always pee before going to sleep. Urine that stays in your bladder can lead to infection. Try to pee before and after sex as well.    Practice good personal hygiene. Wipe yourself from front to back after using the toilet. This helps keep bacteria from getting into the urethra.    Use condoms during sex. These help prevent UTIs caused by sexually transmitted bacteria. Also don't use spermicides during sex. These can increase the risk for UTIs. Choose other forms of birth control instead. For women who tend to get UTIs after sex, a low-dose of a preventive antibiotic may be used. Be sure to discuss this option with  your healthcare provider.    Follow up with your healthcare provider as directed. He or she may test to make sure the infection has cleared. If needed, more treatment may be started.  Joana last reviewed this educational content on 7/1/2019 2000-2021 [employer]. All rights reserved. This information is not intended as a substitute for professional medical care. Always follow your healthcare professional's instructions.

## 2023-01-17 ENCOUNTER — MYC MEDICAL ADVICE (OUTPATIENT)
Dept: NEUROLOGY | Facility: CLINIC | Age: 63
End: 2023-01-17
Payer: COMMERCIAL

## 2023-02-01 ENCOUNTER — TRANSFERRED RECORDS (OUTPATIENT)
Dept: MULTI SPECIALTY CLINIC | Facility: CLINIC | Age: 63
End: 2023-02-01

## 2023-02-01 LAB — RETINOPATHY: NORMAL

## 2023-02-16 NOTE — TELEPHONE ENCOUNTER
REFERRAL INFORMATION:    Referring Provider:  Sudhakar Ledezma APRN CNP    Referring Clinic:  [facility]  Reason for Visit/Diagnosis:   Gastroesophageal reflux disease without esophagitis   Non-intractable vomiting without nausea, unspecified vomiting type      FUTURE VISIT INFORMATION:    Appointment Date: 4/24/2023    Appointment Time:      NOTES STATUS DETAILS   OFFICE NOTE from Referring Provider Internal 8/26/2022 OV with JOSE Ledezma   OFFICE NOTE from Other Specialist N/A    HOSPITAL DISCHARGE SUMMARY/  ED VISITS Care Everywhere 11/22/2022 [facility] ED  6/10/2022 [facility]   OPERATIVE REPORT N/A    MEDICATION LIST Internal         ENDOSCOPY  N/A    COLONOSCOPY Internal 11/4/2022 - [facility]    ERCP N/A    EUS N/A    STOOL TESTING N/A    PERTINENT LABS N/A    PATHOLOGY REPORTS (RELATED) N/A    IMAGING (CT, MRI, EGD, MRCP, Small Bowel Follow Through/SBT, MR/CT Enterography) Received  [facility]:   6/10/2022 US Abdomen   6/10/2022 CT Abdomen          Request sent to [facility] fax [phone]    2/16 Imaging in PACS

## 2023-03-15 ENCOUNTER — OFFICE VISIT (OUTPATIENT)
Dept: NEUROPSYCHOLOGY | Facility: CLINIC | Age: 63
End: 2023-03-15
Payer: COMMERCIAL

## 2023-03-15 DIAGNOSIS — R41.3 COMPLAINTS OF MEMORY DISTURBANCE: Primary | ICD-10-CM

## 2023-03-15 PROCEDURE — 96133 NRPSYC TST EVAL PHYS/QHP EA: CPT

## 2023-03-15 PROCEDURE — 96138 PSYCL/NRPSYC TECH 1ST: CPT

## 2023-03-15 PROCEDURE — 96139 PSYCL/NRPSYC TST TECH EA: CPT

## 2023-03-15 PROCEDURE — 90791 PSYCH DIAGNOSTIC EVALUATION: CPT

## 2023-03-15 PROCEDURE — 96132 NRPSYC TST EVAL PHYS/QHP 1ST: CPT
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
## 2023-03-16 NOTE — NURSING NOTE
Pt was seen for neuropsychological evaluation at the request of Dr. Sudhakar Ledezma for the purposes of diagnostic clarification and treatment planning. 230 minutes of test administration and scoring were provided by this writer. Please see Dr. Sapna Magana's report for a full interpretation of the findings.    Keny Duncan MA, CSP
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
## 2023-03-20 NOTE — PROGRESS NOTES
Provider: CE      Tech: GINA      Patient Name: Lenore Martinez      : [birth year]      MRN: [id number]      [place]: 3/15/23      Age: 62      Education: 12      Ethnicity: C      Handedness: Right      Station: OP             NEUROPSYCHOLOGICAL TESTS RAW SCORE STANDARDIZED SCORE* DESCRIPTIVE RANGE**   PREMORBID ABILITY       WAIS-IV ACS Test of Premorbid Functioning (Estimated FSIQ) 33 SS= 92 Average     Estimated FSIQ = 97 Average   ATTENTION AND EXECUTIVE FUNCTIONS RAW SCORE STANDARDIZED SCORE* DESCRIPTIVE RANGE**   Wechsler Adult Intelligence Scale - 4th Edition (WAIS-IV)      Digit Span Forward 9 ss= 9 Average   Digit Span Backward 8 ss= 10 Average   Digit Span Sequencing 9 ss= 11 Average   Digit Span Total 26 ss= 10 Average   Coding 61 ss= 10 Average   Similarities 25 ss= 10 Average   Matrix Reasoning 21 ss= 14 Above Average   Trail Making Test (Time/errors)        Part A s,r,e 34/0 TS= 47 Average   Part B s,r,e 74/0 TS= 54 Average   Stroop       Word e 89 TS= 44 Average   Color e 64 TS= 43 Average   Color/Word e 30 TS= 45 Average   Interference e -6 TS= 44 Average   Francesca-Phillip Executive Function System (D-KEFS)Verbal Fluency Test    Category Switching: Total Correct 14 ss= 12 High Average   Category Switching: Total Switching 13 ss= 12 High Average   LANGUAGE RAW SCORE STANDARDIZED SCORE* DESCRIPTIVE RANGE**   [place] Naming Test s,r,e 52 TS= 44 Average   D-KEFS Verbal Fluency Test     Letter Fluency Total 11-10-10 (31) ss= 9 Average   Category Fluency Total 19-17 (36) ss= 10 Average   [place] Diagnostic Aphasia Examination (BDAE) Complex Ideational Materials 12 ss= 12 High Average     TS= 58 High Average   VISUOSPATIAL PROCESSING RAW SCORE STANDARDIZED SCORE* DESCRIPTIVE RANGE**   Sheppard Judgment of Line Orientation (JOLO)s 27 %ile= 72 High Average   Ashish Complex Figure Test (RCFT) Copy 31 %ile= 11-16 Low Average   LEARNING & MEMORY RAW SCORE STANDARDIZED SCORE* DESCRIPTIVE RANGE**   Wechsler Memory Scale-4th  "Edition (WMS-IV)      Logical Memory I 26 ss= 11 Average   Logical Memory II 25 ss= 12 High Average   Logical Memory Recognition 25 %= 51-75 Average   [place] Verbal Learning Test - 3rd Edition (CVLT-3; Standard Form)     Total Trials 1-5 se 4-6-10-11-14 (45) TS=  49 Average   Trial 1 se 4 TS= 43 Average   Trial 5se 14 TS= 60 High Average   Learning [place] 2.5 ss= 16 Exceptionally High   SD Free Recall se 11 TS= 56 Average   SD Cued Recall se 12 TS= 52 Average   LD Free Recall se 10 TS= 50 Average   LD Cued Recall se 13 TS= 57 High Average   Total Intrusions se 7 TS= 46 Average   Total Recognition Hits se 14 TS= 44 Average   False Positives se 4 TS= 46 Average   d' se 2.3 TS= 43 Average   Ashish Complex Figure Test (RCFT)       Time to Copy 142\" %ile= >16 Within Normal Limits   Copy 31 %ile= 11-16 Low Average   Short Delay Recall 14.5 TS= 46 Average     %ile= 34 Average   Long Delay Recall 12.5 TS= 41 Low Average     %ile= 18 Low Average   Recognition Total 19 TS= 43 Average     %ile= 24 Low Average   PSYCHOLOGICAL & BEHAVIORAL SYMPTOMS RAW SCORE STANDARDIZED SCORE* DESCRIPTIVE RANGE**   [place]ila Depression Inventory - 2nd Edition (BDI-II) 15 --  MILD   State-Trait Anxiety Inventory (STAI)      State s 39 %ile= 80 High Average   Trait s 33 %ile= 59 Average   [place] Multiphasic Personality Inventory - 3rd Edition (MMPI-3)     Validity (CRIN, VRIN, ISAIAH, F, Fp, Fs, FBS, RBS, L, K) -- TS= 51, 51, 60T, 47, 41, 47, 73, 50, 40, 50 Valid   Substantive Scales (T?65 in order of elevation) -- --  No significant elevations   PERFORMANCE VALIDITY RAW SCORE   DESCRIPTIVE RANGE**   FC 16/16 --  Valid Range   RDS 9 --  Valid Range   ACS Word Choice 49 --  Valid Range          * All standardized scores are adjusted for age. Superscripts denote additional adjustment for (s)ex, (r)ace/ethnicity, (l)anguage, and/or (e)ducation.   ** Descriptive ranges are based on American Academy of Clinical Neuropsychology (2020) consensus guidelines, " or test manuals where appropriate.    WNL = within normal limits. DC = discontinued due to patient s inability to complete the test.     Standardized scores: TS = T-score; mean = 50, standard deviation =10; z = z-score; mean = 0, standard deviation = 1; ss = scaled score; mean = 10, standard deviation = 3; MAS = King[place] Older Adult Normative Study age adjusted scaled score; mean = 10, standard deviation = 3; SS = standard score; mean = 100, standard deviation = 15.

## 2023-03-25 ENCOUNTER — HEALTH MAINTENANCE LETTER (OUTPATIENT)
Age: 63
End: 2023-03-25

## 2023-04-24 ENCOUNTER — PRE VISIT (OUTPATIENT)
Dept: GASTROENTEROLOGY | Facility: CLINIC | Age: 63
End: 2023-04-24

## 2023-04-24 ENCOUNTER — VIRTUAL VISIT (OUTPATIENT)
Dept: GASTROENTEROLOGY | Facility: CLINIC | Age: 63
End: 2023-04-24
Payer: COMMERCIAL

## 2023-04-24 DIAGNOSIS — R68.81 EARLY SATIETY: Primary | ICD-10-CM

## 2023-04-24 DIAGNOSIS — R11.10 REGURGITATION OF FOOD: ICD-10-CM

## 2023-04-24 DIAGNOSIS — Z86.0101 HISTORY OF ADENOMATOUS POLYP OF COLON: ICD-10-CM

## 2023-04-24 DIAGNOSIS — K59.00 CONSTIPATION, UNSPECIFIED CONSTIPATION TYPE: ICD-10-CM

## 2023-04-24 DIAGNOSIS — K21.9 GASTROESOPHAGEAL REFLUX DISEASE WITHOUT ESOPHAGITIS: ICD-10-CM

## 2023-04-24 PROCEDURE — 99205 OFFICE O/P NEW HI 60 MIN: CPT | Mod: VID | Performed by: STUDENT IN AN ORGANIZED HEALTH CARE EDUCATION/TRAINING PROGRAM

## 2023-04-24 ASSESSMENT — PAIN SCALES - GENERAL: PAINLEVEL: NO PAIN (0)

## 2023-04-24 NOTE — NURSING NOTE
Is the patient currently in the state of [place]? YES    Visit mode:VIDEO    If the visit is dropped, the patient can be reconnected by: VIDEO VISIT: Text to cell phone: [phone]    Will anyone else be joining the visit? NO      How would you like to obtain your AVS? MyChart    Are changes needed to the allergy or medication list? NO    Reason for visit: No chief complaint on file.

## 2023-04-24 NOTE — PROGRESS NOTES
GASTROENTEROLOGY NEW PATIENT VIDEO VISIT    CC/REFERRING MD:    Sudhakar Ledezma    REASON FOR CONSULTATION:   Sudhakar Ledezma for   Chief Complaint   Patient presents with     Video Visit       HISTORY OF PRESENT ILLNESS:    Lenore Martinez is a 62 year old female with a past medical history significant for GERD and constipation who is being evaluated via a billable video visit for regurgitation and early satiety.     Lenore reports 2 years of progressively worsening regurgitation after meals and early satiety. She reports that if she eats more than 1 cup worth of food, or if she drinks a lot of fluids with meals, she will regurgitate mainly liquids, sometimes undigested foods. This seems to be worse with certain foods such as soups, mashed potatoes and gravy, and worse if she drinks with her meals rather than separates food and drink. She finds steak and other meats to be difficult to digest. She also finds that if she bends forward after eating she will regurgitate food or liquid. This is sometimes undigested food. She has been eating smaller meals, limiting herself to 1 cup of food every 2-3 hours, and finds this to help.     Bowel movements are typically daily now, though she does report some pellet like stools. Reports she will have daily BMs for 2-3 days, and then skip a day, and go back to daily BMs for 2-3 days again. States 6 months ago she was more constipated, having a BM every 3 days or so. Six months ago she also switched an OTC plant based digestive enzyme ([employer] brand) from later in the day to early in the morning, and feels that this helped with her regurgitation symptoms.     She has had CT Abd/pelvis in 6/22 noting moderate stool burden, abdominal US 6/22 noting gallbladder sludge, colnoscopy 11/22 with 14 mm adenomatous polyp with recommendation to repeat in 1 year. Reports and EGD 40 years ago completed for reflux symptoms.    Denies abdominal pain, blood in stool, dysphagia,  unintentional weight loss.    She has a history of acid reflux but reports this is now very well controlled with diet and no longer needs PPI. She feels her symptoms now are very different than her reflux symptoms.     Notes history of pre-diabetes, with last A1c 5.5% in 8/22. Has had a hysterectomy, no other abdominal surgeries.      I have reviewed and updated the patient's Past Medical History, Social History, Family History and Medication List.    Exam:    General appearance:  Healthy appearing adult, in no acute distress  Eyes:  Sclera anicteric  Ears, nose, mouth and throat:  No obvious external lesions of ears and nose.  Hearing intact  Neck:  Symmetric, No obvious external lesions  Respiratory:  Normal respiration, no use of accessory muscles   MSK:  No visual upper extremity, neck or facial muscle atrophy  Skin:  No rashes or jaundice   Psychiatric:  Oriented to person, place and time, Appropriate mood and affect.   Neurologic:  Peripheral muscle function and dexterity appear to be intact      PERTINENT STUDIES have been reviewed.    ASSESSMENT/PLAN:    Lenore Martinez is a 62 year old female who presents for evaluation of regurgitation and early satiety.    Gastroesophageal reflux disease without esophagitis  GERD is well controlled by diet. No longer needing PPI.    - Adult GI  Referral - Consult Only    Regurgitation of food  Early satiety  Constipation, unspecified constipation type  2 year history of progressively worsening regurgitation of food and liquid after meals and early satiety. She has already reduced portion sizes and modifies her diet and finds this to be helpful. We discussed potential etiologies including structural and/or functional issues that may contribute including gastroparesis, hiatal hernia, ulceration, or constipation. Recommend going forward with both an EGD and 4 hour GES, which patient is agreeable to. We also discussed improving bowel movements as this may contribute  to gastroparesis. Recommend addition of 1 tbsp of Miralax daily initially to regulate bowel habits. She does have Benefiber at home and may decide to try this, and we did discuss that this may be more difficult to digest. Plan for follow up in 3 months or sooner if needed.    - Adult GI  Referral - Consult Only    History of adenomatous polyp of colon  Last colonoscopy in 11/22 was found to have 14 mm adenomatous polyp with recommendation to repeat in 1 year.       Video-Visit Details    Video Visit Time: 45    Type of service:  Video Visit    Originating Location (pt. Location): Home        Distant Location (provider location):  Off-site    Platform used for Video Visit: [facility] 3 months    Thank you for this consultation.  It was a pleasure to participate in the care of this patient; please contact us with any further questions.  A total of 70 minutes was spent with reviewing the chart, discussing with the patient, documentation and coordination of care.    This note was created with voice recognition software, and while reviewed for accuracy, typos may remain.     Veronica La PA-C  Division of Gastroenterology, Hepatology and Nutrition  [facility]

## 2023-04-24 NOTE — PATIENT INSTRUCTIONS
It was a pleasure taking care of you today.  I've included a brief summary of our discussion and care plan from today's visit below.  Please review this information with your primary care provider.  _______________________________________________________________________     My recommendations are summarized as follows:     - 4 hour Gastric Emptying Study ordered to assess how quickly food moves through your stomach   - Upper Endoscopy (EGD) ordered to assess the structure of your esophagus and stomach   - Start Miralax 1 tbsp daily which is a gentle laxative, to help regulate bowel habits. If you find this dose to be too much (causing diarrhea), it is ok to take every other day, and if not enough (not having daily bowel movement), it is okay to increase the dose to 1.5 or 2 tbsp daily  - You can try Benefiber, but fiber tends to be more difficult to digest  - Follow up in 3 months with Veronica La PA-C or Dolores Reveles PA-C     ______________________________________________________________________     How do I schedule labs, imaging studies, or procedures that were ordered in clinic today?      Labs: To schedule lab appointment you can contact your local [facility] or call [phone] to schedule at any convenient [facility] location.      Procedures: If a colonoscopy, upper endoscopy, breath test, esophageal manometry, or pH impedence was ordered today, our endoscopy team will call you to schedule this. If you have not heard from our endoscopy team within a week, please call [phone] to schedule.      Imaging Studies: If you were scheduled for a CT scan, X-ray, MRI, ultrasound, HIDA scan or other imaging study, please call [phone] to have this scheduled.      Referral: If a referral to another specialty was ordered, expect a phone call or follow instructions above. If you have not heard from anyone regarding your referral in a week, please call our clinic to check the status.      Who do  I call with any questions after my visit?  Please be in touch if there are any further questions that arise following today's visit.  There are multiple ways to contact your gastroenterology care team.       During business hours, you may reach a Gastroenterology nurse at [phone]     To schedule or reschedule an appointment, please call [phone].      You can always send a secure message through [employer].  [employer] messages are answered by your nurse or doctor typically within 24 hours.  Please allow extra time on weekends and holidays.       For urgent/emergent questions after business hours, you may reach the on-call GI Fellow by contacting the [facility]  at [phone].     How will I get the results of any tests ordered?    You will receive all of your results.  If you have signed up for [facility], any tests ordered at your visit will be available to you after your physician reviews them.  Typically this takes 1-2 weeks.  If there are urgent results that require a change in your care plan, your physician or nurse will call you to discuss the next steps.       What is [employer]?  [employer] is a secure way for you to access all of your healthcare records from the [facility].  It is a web based computer program, so you can sign on to it from any location.  It also allows you to send secure messages to your care team.  I recommend signing up for [employer] access if you have not already done so and are comfortable with using a computer.       How to I schedule a follow-up visit?  If you did not schedule a follow-up visit today, please call [phone] to schedule a follow-up office visit.

## 2023-04-25 ENCOUNTER — TELEPHONE (OUTPATIENT)
Dept: GASTROENTEROLOGY | Facility: CLINIC | Age: 63
End: 2023-04-25
Payer: COMMERCIAL

## 2023-04-25 NOTE — TELEPHONE ENCOUNTER
There are no results found available for follow up scheduling, sent message to coordination pool.       Appointment type: 3 month follow up video  Provider: Veronica La PA-C  Return date: July 2023  Specialty phone number: n/a  Additional appointment(s) needed: complex scheduling needed.   Additonal Notes: n/a

## 2023-05-02 ENCOUNTER — TELEPHONE (OUTPATIENT)
Dept: GASTROENTEROLOGY | Facility: CLINIC | Age: 63
End: 2023-05-02
Payer: COMMERCIAL

## 2023-05-02 NOTE — TELEPHONE ENCOUNTER
Left Voicemail (1st Attempt) for the patient to call back and schedule the following:    Appointment type: Return  Provider: Dolores Reveles PA-C  Return date: 7/24/2023  Specialty phone number: [phone]    Cassia mclain Procedure   Dermatology, Surgery, Urology  [facility]

## 2023-05-05 ENCOUNTER — TELEPHONE (OUTPATIENT)
Dept: GASTROENTEROLOGY | Facility: CLINIC | Age: 63
End: 2023-05-05
Payer: COMMERCIAL

## 2023-05-05 NOTE — TELEPHONE ENCOUNTER
KASH, sent [employer] to schedule the following appointment:    Follow up with Veronica La in 3 months (around 7/25/23). Return GI, in person or virtual. Ok to use New slot for now per Maria G CHILD. Left L pod #

## 2023-05-07 ENCOUNTER — E-VISIT (OUTPATIENT)
Dept: FAMILY MEDICINE | Facility: CLINIC | Age: 63
End: 2023-05-07
Payer: COMMERCIAL

## 2023-05-07 DIAGNOSIS — R82.90 ABNORMAL URINE ODOR: Primary | ICD-10-CM

## 2023-05-07 PROCEDURE — 99207 PR NON-BILLABLE SERV PER CHARTING: CPT

## 2023-05-08 NOTE — PATIENT INSTRUCTIONS
Thank you for choosing us for your care. I think an in-clinic visit would be best next steps based on your symptoms. Please schedule a clinic appointment; you won t be charged for this eVisit.      You can schedule an appointment right here in [facility], or call [phone]

## 2023-05-10 ENCOUNTER — OFFICE VISIT (OUTPATIENT)
Dept: FAMILY MEDICINE | Facility: CLINIC | Age: 63
End: 2023-05-10
Payer: COMMERCIAL

## 2023-05-10 VITALS
BODY MASS INDEX: 24.96 KG/M2 | OXYGEN SATURATION: 98 % | RESPIRATION RATE: 15 BRPM | DIASTOLIC BLOOD PRESSURE: 60 MMHG | HEIGHT: 65 IN | TEMPERATURE: 97.8 F | HEART RATE: 66 BPM | WEIGHT: 149.8 LBS | SYSTOLIC BLOOD PRESSURE: 100 MMHG

## 2023-05-10 DIAGNOSIS — N30.90 BLADDER INFECTION: ICD-10-CM

## 2023-05-10 DIAGNOSIS — F31.9 BIPOLAR DISEASE, CHRONIC (H): ICD-10-CM

## 2023-05-10 DIAGNOSIS — Z13.1 SCREENING FOR DIABETES MELLITUS: ICD-10-CM

## 2023-05-10 DIAGNOSIS — E11.65 TYPE 2 DIABETES MELLITUS WITH HYPERGLYCEMIA, WITHOUT LONG-TERM CURRENT USE OF INSULIN (H): ICD-10-CM

## 2023-05-10 DIAGNOSIS — E11.42 DIABETIC POLYNEUROPATHY ASSOCIATED WITH TYPE 2 DIABETES MELLITUS (H): ICD-10-CM

## 2023-05-10 DIAGNOSIS — R30.0 DYSURIA: Primary | ICD-10-CM

## 2023-05-10 LAB
ALBUMIN UR-MCNC: ABNORMAL MG/DL
APPEARANCE UR: ABNORMAL
BACTERIA #/AREA URNS HPF: ABNORMAL /HPF
BILIRUB UR QL STRIP: NEGATIVE
COLOR UR AUTO: YELLOW
GLUCOSE UR STRIP-MCNC: NEGATIVE MG/DL
HBA1C MFR BLD: 5.6 % (ref 0–5.6)
HGB UR QL STRIP: ABNORMAL
KETONES UR STRIP-MCNC: NEGATIVE MG/DL
LEUKOCYTE ESTERASE UR QL STRIP: ABNORMAL
NITRATE UR QL: POSITIVE
PH UR STRIP: 6 [PH] (ref 5–7)
RBC #/AREA URNS AUTO: ABNORMAL /HPF
SP GR UR STRIP: 1.02 (ref 1–1.03)
UROBILINOGEN UR STRIP-ACNC: 0.2 E.U./DL
WBC #/AREA URNS AUTO: ABNORMAL /HPF

## 2023-05-10 PROCEDURE — 87086 URINE CULTURE/COLONY COUNT: CPT | Performed by: NURSE PRACTITIONER

## 2023-05-10 PROCEDURE — 83036 HEMOGLOBIN GLYCOSYLATED A1C: CPT | Performed by: NURSE PRACTITIONER

## 2023-05-10 PROCEDURE — 80048 BASIC METABOLIC PNL TOTAL CA: CPT | Performed by: NURSE PRACTITIONER

## 2023-05-10 PROCEDURE — 99214 OFFICE O/P EST MOD 30 MIN: CPT | Performed by: NURSE PRACTITIONER

## 2023-05-10 PROCEDURE — 87186 SC STD MICRODIL/AGAR DIL: CPT | Performed by: NURSE PRACTITIONER

## 2023-05-10 PROCEDURE — 36415 COLL VENOUS BLD VENIPUNCTURE: CPT | Performed by: NURSE PRACTITIONER

## 2023-05-10 PROCEDURE — 81001 URINALYSIS AUTO W/SCOPE: CPT | Performed by: NURSE PRACTITIONER

## 2023-05-10 ASSESSMENT — PAIN SCALES - GENERAL: PAINLEVEL: NO PAIN (0)

## 2023-05-10 NOTE — PROGRESS NOTES
Assessment & Plan     Dysuria  - UA with Microscopic reflex to Culture - lab collect  - Urine Microscopic Exam  - Urine Culture    Type 2 diabetic mellitus with hyperglycemia  - HEMOGLOBIN A1C  - Basic metabolic panel  (Ca, Cl, CO2, Creat, Gluc, K, Na, BUN)    Bipolar disease, chronic (H)  Known issue that I take into account for their medical decisions, no current exacerbations or new concerns.     Diabetic polyneuropathy associated with diabetes mellitus type 2 (H)  Known issue that I take into account for their medical decisions, no current exacerbations or new concerns.  Followed by Neurology    Addendum:  Urine culture came back positive for E. Coli that is sensitive to Nitrofurantion- prescription sent and will notify patient.               A total of 30 minutes were spent face-to-face with the patient during this encounter and over half of that time was spend on counseling and coordination of care.  We discussed in depth the nature of problem(s) listed above (see diagnoses listed above), course, prior treatments, and therapeutic options.  I also educated the patient about lifestyle modifications which may improve the problem.    Aubree Laguna NP  [facility]    Todd Grover is a 62 year old, presenting for the following health issues:  Urinary Problem (Some burning and odor , since nov. 2022)        5/10/2023     9:45 AM   Additional Questions   Roomed by Ginger LONGO     History of Present Illness       Reason for visit:  Bad urine odor after eating meat and dairy products    She eats 2-3 servings of fruits and vegetables daily.She consumes 0 sweetened beverage(s) daily.She exercises with enough effort to increase her heart rate 30 to 60 minutes per day.  She exercises with enough effort to increase her heart rate 5 days per week.   She is taking medications regularly.       Genitourinary - Female  Onset/Duration: 11/2022  Description:   Painful urination (Dysuria): YES-  "burning in the vaginal region           Frequency: No  Blood in urine (Hematuria): No  Delay in urine (Hesitency): No  Has not been ha  No vaginal discharge  Intensity: severe  Progression of Symptoms:  worsening and intermittent  Accompanying Signs & Symptoms:  Fever/chills: No  Flank pain: No  Nausea and vomiting: No  Vaginal symptoms: none  Abdominal/Pelvic Pain: No  History:   History of frequent UTI s: YES- 12/29/2022  History of kidney stones: YES- 15 yrs ago   Sexually Active: No  Possibility of pregnancy: No  Precipitating or alleviating factors: tylenol and diet cranberry juice    Therapies tried and outcome: Cranberry juice prn (contraindicated in Coumadin patients)- some relief     Vinegar smell of urine after dairy and meat.  This started in November 2023.  Gradual but then expanded.      Review of Systems   Constitutional, HEENT, cardiovascular, pulmonary, gi and gu systems are negative, except as otherwise noted.      Objective    /60 (BP Location: Right arm, Patient Position: Sitting, Cuff Size: Adult Regular)   Pulse 66   Temp 97.8  F (36.6  C) (Oral)   Resp 15   Ht 1.651 m (5' 5\")   Wt 67.9 kg (149 lb 12.8 oz)   SpO2 98%   BMI 24.93 kg/m    Body mass index is 24.93 kg/m .  Physical Exam   GENERAL: healthy, alert and no distress  ABDOMEN: soft, non tender  BACK: no CVA tenderness, no paralumbar tenderness  PSYCH: mentation appears normal, affect normal/bright    Results for orders placed or performed in visit on 05/10/23   UA with Microscopic reflex to Culture - lab collect     Status: Abnormal    Specimen: Urine, Midstream   Result Value Ref Range    Color Urine Yellow Colorless, Straw, Light Yellow, Yellow    Appearance Urine Cloudy (A) Clear    Glucose Urine Negative Negative mg/dL    Bilirubin Urine Negative Negative    Ketones Urine Negative Negative mg/dL    Specific Gravity Urine 1.025 1.003 - 1.035    Blood Urine Trace (A) Negative    pH Urine 6.0 5.0 - 7.0    Protein Albumin " Urine Trace (A) Negative mg/dL    Urobilinogen Urine 0.2 0.2, 1.0 E.U./dL    Nitrite Urine Positive (A) Negative    Leukocyte Esterase Urine Moderate (A) Negative   Urine Microscopic Exam     Status: Abnormal   Result Value Ref Range    Bacteria Urine Many (A) None Seen /HPF    RBC Urine 0-2 0-2 /HPF /HPF    WBC Urine 10-25 (A) 0-5 /HPF /HPF   HEMOGLOBIN A1C     Status: Normal   Result Value Ref Range    Hemoglobin A1C 5.6 0.0 - 5.6 %   Basic metabolic panel  (Ca, Cl, CO2, Creat, Gluc, K, Na, BUN)     Status: Abnormal   Result Value Ref Range    Sodium 140 136 - 145 mmol/L    Potassium 4.6 3.4 - 5.3 mmol/L    Chloride 103 98 - 107 mmol/L    Carbon Dioxide (CO2) 27 22 - 29 mmol/L    Anion Gap 10 7 - 15 mmol/L    Urea Nitrogen 10.7 8.0 - 23.0 mg/dL    Creatinine 0.90 0.51 - 0.95 mg/dL    Calcium 9.3 8.8 - 10.2 mg/dL    Glucose 101 (H) 70 - 99 mg/dL    GFR Estimate 73 >60 mL/min/1.73m2   Urine Culture     Status: Abnormal    Specimen: Urine, Midstream   Result Value Ref Range    Culture >100,000 CFU/mL Escherichia coli (A)        Susceptibility    Escherichia coli - GIANCARLO     Ampicillin <=2 Susceptible ug/mL     Ampicillin/ Sulbactam <=2 Susceptible ug/mL     Piperacillin/Tazobactam <=4 Susceptible ug/mL     Cefazolin* <=4 Susceptible ug/mL      * Cefazolin GIANCARLO breakpoints are for the treatment of uncomplicated urinary tract infections. For the treatment of systemic infections, please contact the laboratory for additional testing.     Cefoxitin <=4 Susceptible ug/mL     Ceftazidime <=1 Susceptible ug/mL     Ceftriaxone <=1 Susceptible ug/mL     Cefepime <=1 Susceptible ug/mL     Gentamicin <=1 Susceptible ug/mL     Tobramycin <=1 Susceptible ug/mL     Ciprofloxacin 0.5 Intermediate ug/mL     Levofloxacin 1 Intermediate ug/mL     Nitrofurantoin <=16 Susceptible ug/mL     Trimethoprim/Sulfamethoxazole >16/304 Resistant ug/mL

## 2023-05-11 LAB
ANION GAP SERPL CALCULATED.3IONS-SCNC: 10 MMOL/L (ref 7–15)
BUN SERPL-MCNC: 10.7 MG/DL (ref 8–23)
CALCIUM SERPL-MCNC: 9.3 MG/DL (ref 8.8–10.2)
CHLORIDE SERPL-SCNC: 103 MMOL/L (ref 98–107)
CREAT SERPL-MCNC: 0.9 MG/DL (ref 0.51–0.95)
DEPRECATED HCO3 PLAS-SCNC: 27 MMOL/L (ref 22–29)
GFR SERPL CREATININE-BSD FRML MDRD: 73 ML/MIN/1.73M2
GLUCOSE SERPL-MCNC: 101 MG/DL (ref 70–99)
POTASSIUM SERPL-SCNC: 4.6 MMOL/L (ref 3.4–5.3)
SODIUM SERPL-SCNC: 140 MMOL/L (ref 136–145)

## 2023-05-12 LAB — BACTERIA UR CULT: ABNORMAL

## 2023-05-15 ENCOUNTER — TELEPHONE (OUTPATIENT)
Dept: FAMILY MEDICINE | Facility: CLINIC | Age: 63
End: 2023-05-15
Payer: COMMERCIAL

## 2023-05-15 RX ORDER — NITROFURANTOIN 25; 75 MG/1; MG/1
100 CAPSULE ORAL 2 TIMES DAILY
Qty: 10 CAPSULE | Refills: 0 | Status: SHIPPED | OUTPATIENT
Start: 2023-05-15 | End: 2023-05-20

## 2023-05-15 NOTE — TELEPHONE ENCOUNTER
----- Message from Aubree Laguna NP sent at 5/15/2023 12:30 PM CDT -----  Please call to make sure she is aware of urine culture result- I sent in prescription for Nitrofurantion (Macrobid) to treat this.    Aubree Laguna, ZAYNAB, APRN, CNP   
Attempt #1 to call patient.     RN left voicemail and requested return call to [facility] at [phone].     Belinda Sol RN  [facility]: [place]  
Patient returned call to clinic, let her know lab results and medication sent to pharmacy.      Carmen Hairston RN  
No bruits; no thyromegaly or nodules

## 2023-05-15 NOTE — PATIENT INSTRUCTIONS
Urinary Tract Infections in Women  Urinary tract infections (UTIs) are most often caused by bacteria. These bacteria enter the urinary tract. The bacteria may come from inside the body. Or they may travel from the skin outside the rectum or vagina into the urethra. Female anatomy makes it easy for bacteria from the bowel to enter a woman s urinary tract, which is the most common source of UTI. This means women develop UTIs more often than men. Pain in or around the urinary tract is a common UTI symptom. But the only way to know for sure if you have a UTI for the healthcare provider to test your urine. The two tests that may be done are the urinalysis and urine culture.    Types of UTIs    Cystitis. A bladder infection (cystitis) is the most common UTI in women. You may have urgent or frequent need to pee. You may also have pain, burning when you pee, and bloody urine.    Urethritis. This is an inflamed urethra, which is the tube that carries urine from the bladder to outside the body. You may have lower stomach or back pain. You may also have urgent or frequent need to pee.    Pyelonephritis. This is a kidney infection. If not treated, it can be serious and damage your kidneys. In severe cases, you may need to stay in the hospital. You may have a fever and lower back pain.    Medicines to treat a UTI  Most UTIs are treated with antibiotics. These kill the bacteria. The length of time you need to take them depends on the type of infection. It may be as short as 3 days. If you have repeated UTIs, you may need a low-dose antibiotic for several months. Take antibiotics exactly as directed. Don t stop taking them until all of the medicine is gone, even if you feel better. If you stop taking the antibiotic too soon, the infection may not go away. You may also develop a resistance to the antibiotic. This can make it much harder to treat.  Lifestyle changes to treat and prevent UTIs  The lifestyle changes below will  help get rid of your UTI. They may also help prevent future UTIs.    Drink plenty of fluids. This includes water, juice, or other caffeine-free drinks. Fluids help flush bacteria out of your body.    Empty your bladder. Always empty your bladder when you feel the urge to pee. And always pee before going to sleep. Urine that stays in your bladder can lead to infection. Try to pee before and after sex as well.    Practice good personal hygiene. Wipe yourself from front to back after using the toilet. This helps keep bacteria from getting into the urethra.    Wear cotton underwear. Don't wear synthetic or tight-fitting underwear that can trap moisture. Change out of wet bathing suits and workout clothing quickly.    Take showers. Showers are better than baths for preventing UTIs.    Use condoms during sex. These help prevent UTIs caused by sexually transmitted bacteria. Also don't use spermicides during sex. These can increase the risk for UTIs. Choose other forms of birth control instead. For women who tend to get UTIs after sex, a low-dose of a preventive antibiotic may be used. Be sure to discuss this option with your healthcare provider.    Follow up with your healthcare provider as directed. They may test to make sure the infection has cleared. If needed, more treatment may be started.  Joana last reviewed this educational content on 9/1/2021 2000-2022 [employer]. All rights reserved. This information is not intended as a substitute for professional medical care. Always follow your healthcare professional's instructions.

## 2023-05-22 ENCOUNTER — HOSPITAL ENCOUNTER (OUTPATIENT)
Dept: NUCLEAR MEDICINE | Facility: CLINIC | Age: 63
Setting detail: NUCLEAR MEDICINE
Discharge: HOME | End: 2023-05-22
Attending: STUDENT IN AN ORGANIZED HEALTH CARE EDUCATION/TRAINING PROGRAM | Admitting: STUDENT IN AN ORGANIZED HEALTH CARE EDUCATION/TRAINING PROGRAM
Payer: COMMERCIAL

## 2023-05-22 DIAGNOSIS — R11.10 REGURGITATION OF FOOD: ICD-10-CM

## 2023-05-22 DIAGNOSIS — R68.81 EARLY SATIETY: ICD-10-CM

## 2023-05-22 PROCEDURE — 78264 GASTRIC EMPTYING IMG STUDY: CPT | Mod: 26 | Performed by: RADIOLOGY

## 2023-05-22 PROCEDURE — 78264 GASTRIC EMPTYING IMG STUDY: CPT

## 2023-05-22 PROCEDURE — 343N000001 HC RX 343: Performed by: STUDENT IN AN ORGANIZED HEALTH CARE EDUCATION/TRAINING PROGRAM

## 2023-05-22 PROCEDURE — A9541 TC99M SULFUR COLLOID: HCPCS | Performed by: STUDENT IN AN ORGANIZED HEALTH CARE EDUCATION/TRAINING PROGRAM

## 2023-05-22 RX ADMIN — Medication 2 MILLICURIE: at 08:27

## 2023-05-23 DIAGNOSIS — K31.84 GASTROPARESIS: Primary | ICD-10-CM

## 2023-06-02 ENCOUNTER — TELEPHONE (OUTPATIENT)
Dept: GASTROENTEROLOGY | Facility: CLINIC | Age: 63
End: 2023-06-02
Payer: COMMERCIAL

## 2023-06-02 NOTE — TELEPHONE ENCOUNTER
Screening Questions  BLUE  KIND OF PREP RED  LOCATION [review exclusion criteria] GREEN  SEDATION TYPE        Y Are you active on mychart?       CADY ROCHA Ordering/Referring Provider?        HP What type of coverage do you have?      N Have you had a positive covid test in the last 14 days?     24.1 1. BMI  [BMI 40+ - review exclusion criteria& smart-phrase document]    Y  2. Are you able to give consent for your medical care? [IF NO,RN REVIEW]          N  3. Are you taking any prescription pain medications on a routine schedule   (ex narcotics: oxycodone, roxicodone, oxycontin,  and percocet)? [RN Review]          3a. EXTENDED PREP What kind of prescription?     N 4. Do you have any chemical dependencies such as alcohol, street drugs, or methadone?        **If yes 3- 5 , please schedule with MAC sedation.**          IF YES TO ANY 6 - 10 - HOSPITAL SETTING ONLY.     N 6.   Do you need assistance transferring?     N 7.   Have you had a heart or lung transplant?    N 8.   Are you currently on dialysis?   N 9.   Do you use daily home oxygen?   N 10. Do you take nitroglycerin?   10a.  If yes, how often?      11. Are you currently pregnant?    11a.  If yes, how many weeks? [ Greater than 12 weeks, OR NEEDED]    N 12. Do you have Pulmonary Hypertension? *NEED PAC APPT AT [facility] w/ [facility]*     N 13. [review exclusion criteria]  Do you have any implantable devices in your body (pacemaker, defib, LVAD)?    N 14. In the past 6 months, have you had any heart related issues including cardiomyopathy or heart attack?     14a.  If yes, did it require cardiac stenting if so when?     N 15. Have you had a stroke or Transient ischemic attack (TIA - aka  mini stroke ) within 6 months?      N 16. Do you have mod to severe Obstructive Sleep Apnea?  [Hospital only]    N 17. Do you have SEVERE AND UNCONTROLLED asthma? *NEED PAC APPT AT [facility] w/[facility]*     18.Do you take blood thinners?  No    N 19. Do you take any of the following  "medications?    Phentermine    Ozempic    Wegovy (Semaglutide)      19a. If yes, \"Hold for 7 days before procedure.  Please consult your prescribing provider if you have questions about holding this medication.\"     N  20. Do you have chronic kidney disease?      N  21. Do you have a diagnosis of diabetes?       22. On a regular basis do you go 3-5 days between bowel movements?      23. Preferred LOCAL Pharmacy for Pre Prescription      [id number] [address] AT [facility] & [place]        - CLOSING REMINDERS -    You will receive a call from a Nurse to review instructions and health history.  This assessment must be completed prior to your procedure.  Failure to complete the Nurse assessment may result in the procedure being cancelled.      On the day of your procedure, please designatean adult(s) who can drive you home stay with you for the next 24 hours. The medicines used in the exam will make you sleepy. You will not be able to drive.      You cannot take public transportation, ride share services, or non-medical taxi service without a responsible caregiver.  Medical transport services are allowed with the requirement that a responsible caregiver will receive you at your destination.  We require that drivers and caregivers are confirmed prior to your procedure.      - SCHEDULING DETAILS -  N &  Hospital Setting Required & If yes, what is the exclusion?   ANNIE  Surgeon    9/7  Date of Procedure  Upper Endoscopy [EGD]  Type of Procedure Scheduled  [facility] Location   Which Colonoscopy Prep was Sent?     MAC Sedation Type     N PAC / Pre-op Required                 "

## 2023-06-08 ENCOUNTER — ANCILLARY PROCEDURE (OUTPATIENT)
Dept: GENERAL RADIOLOGY | Facility: CLINIC | Age: 63
End: 2023-06-08
Attending: PHYSICIAN ASSISTANT
Payer: COMMERCIAL

## 2023-06-08 ENCOUNTER — OFFICE VISIT (OUTPATIENT)
Dept: URGENT CARE | Facility: URGENT CARE | Age: 63
End: 2023-06-08
Payer: COMMERCIAL

## 2023-06-08 VITALS
WEIGHT: 148.1 LBS | HEART RATE: 78 BPM | TEMPERATURE: 98.3 F | DIASTOLIC BLOOD PRESSURE: 65 MMHG | OXYGEN SATURATION: 98 % | RESPIRATION RATE: 20 BRPM | SYSTOLIC BLOOD PRESSURE: 95 MMHG | BODY MASS INDEX: 24.65 KG/M2

## 2023-06-08 DIAGNOSIS — S69.92XA INJURY OF LEFT HAND, INITIAL ENCOUNTER: ICD-10-CM

## 2023-06-08 DIAGNOSIS — M65.312 TRIGGER FINGER OF LEFT THUMB: ICD-10-CM

## 2023-06-08 DIAGNOSIS — E11.42 DIABETIC POLYNEUROPATHY ASSOCIATED WITH TYPE 2 DIABETES MELLITUS (H): ICD-10-CM

## 2023-06-08 DIAGNOSIS — S63.602A SPRAIN OF LEFT THUMB, UNSPECIFIED SITE OF DIGIT, INITIAL ENCOUNTER: Primary | ICD-10-CM

## 2023-06-08 PROCEDURE — 73130 X-RAY EXAM OF HAND: CPT | Mod: TC | Performed by: RADIOLOGY

## 2023-06-08 PROCEDURE — 99213 OFFICE O/P EST LOW 20 MIN: CPT | Performed by: PHYSICIAN ASSISTANT

## 2023-06-08 ASSESSMENT — ENCOUNTER SYMPTOMS
CARDIOVASCULAR NEGATIVE: 1
SORE THROAT: 0
HEADACHES: 0
NAUSEA: 0
DIZZINESS: 0
NECK PAIN: 0
WEAKNESS: 0
EYES NEGATIVE: 1
COUGH: 0
RHINORRHEA: 0
NECK STIFFNESS: 0
LIGHT-HEADEDNESS: 0
SHORTNESS OF BREATH: 0
ARTHRALGIAS: 1
BACK PAIN: 0
PALPITATIONS: 0
ENDOCRINE NEGATIVE: 1
CHILLS: 0
VOMITING: 0
RESPIRATORY NEGATIVE: 1
JOINT SWELLING: 0
WOUND: 0
FEVER: 0
ALLERGIC/IMMUNOLOGIC NEGATIVE: 1
MYALGIAS: 0
DIARRHEA: 0

## 2023-06-08 NOTE — PROGRESS NOTES
Chief Complaint:     Chief Complaint   Patient presents with     Hand Injury     Left; hit on doorframe; thumb pain had previously but now increased discomfort       ASSESSMENT     1. Diabetic polyneuropathy associated with type 2 diabetes mellitus (H)    2. Injury of left hand, initial encounter       PLAN    XR of the L hand was negative for any acute fracture per my read.  RICE discussed.  Order placed for follow up with ortho for further evaluation and possible injection of trigger finger of L thumb.  Patient given Thumb Spica splint for support and comfort.  Recommended rest and avoidance of activities which cause pain or swelling.  Pain relief: Acetaminophen and or Ibuprofen with food.  Patient at higher risk for delayed healing with DM.  Patient instructed to monitor blood sugars closely with illness.  Continue monitoring and follow up with your primary provider for any DM medication changes if needed.  Patient verbalized understanding, and agrees with this plan.       HPI: Lenore Martinez is an 62 year old female who presents today for evaluation of left tumb injury. Onset of the injury was 2 days ago when she hit her thumb on a door frame. She has been icing the area and using tylenol since, but has gotten increasingly more uncomfortable. Prior to the injury she had been experiencing some discomfort in her distal left thumb and was wearing a brace on her L wrist and thumb because of a popping sensation in the DIP joint of her thumb each time she extended it. This has been going on for about 10 days total. The brace had been helping with that discomfort, but since her injury on the door frame she has not been able to wear the brace.     Patient denies any numbness, tingling, or dysfunction of the left hand or thumb.       ROS:      Review of Systems   Constitutional: Negative for chills and fever.   HENT: Negative for congestion, ear pain, rhinorrhea and sore throat.    Eyes: Negative.    Respiratory:  Negative.  Negative for cough and shortness of breath.    Cardiovascular: Negative.  Negative for chest pain and palpitations.   Gastrointestinal: Negative for diarrhea, nausea and vomiting.   Endocrine: Negative.    Genitourinary: Negative.    Musculoskeletal: Positive for arthralgias. Negative for back pain, joint swelling, myalgias, neck pain and neck stiffness.   Skin: Negative.  Negative for rash and wound.   Allergic/Immunologic: Negative.  Negative for immunocompromised state.   Neurological: Negative for dizziness, weakness, light-headedness and headaches.        Problem history  Patient Active Problem List   Diagnosis     PTSD (post-traumatic stress disorder)     Hypovitaminosis D     Migraine with aura      Bipolar disease, chronic (H)     Diabetic neuropathy (H)     Hyperlipidemia LDL goal <70        Allergies  Allergies   Allergen Reactions     Compazine      Anxiety          Smoking History  History   Smoking Status     Never   Smokeless Tobacco     Never        Current Meds    Current Outpatient Medications:      Aspirin-Acetaminophen-Caffeine (EXCEDRIN MIGRAINE PO), Take  by mouth as needed., Disp: , Rfl:      citalopram (CELEXA) 20 MG tablet, Take 20 mg by mouth daily, Disp: , Rfl:      citalopram (CELEXA) 40 MG tablet, Take 40 mg by mouth daily, Disp: , Rfl:      Digestive Enzymes (DIGESTIVE ENZYME PO), [employer] digestive enzymes, Disp: , Rfl:      gabapentin (NEURONTIN) 600 MG tablet, Take 2 tablets (1200 mg) by mouth 3 times daily, Disp: 180 tablet, Rfl: 11     lamoTRIgine (LAMICTAL) 100 MG tablet, Take 100 mg by mouth 3 times daily., Disp: , Rfl:      Specialty Vitamins Products (BRAIN PO), [employer] dynamic brain supplement, Disp: , Rfl:      Vitamin D, Cholecalciferol, 1000 units TABS, Take 1,000 mg by mouth daily, Disp: , Rfl:      ondansetron (ZOFRAN ODT) 4 MG ODT tab, , Disp: , Rfl:         Vital signs reviewed by Tyrese Leal PA-C  BP 95/65   Pulse 78   Temp 98.3  F  (36.8  C) (Tympanic)   Resp 20   Wt 67.2 kg (148 lb 1.6 oz)   SpO2 98%   BMI 24.65 kg/m      Physical Exam     Physical Exam  Vitals and nursing note reviewed.   Constitutional:       General: She is not in acute distress.     Appearance: She is well-developed. She is not ill-appearing, toxic-appearing or diaphoretic.   HENT:      Head: Normocephalic and atraumatic.      Right Ear: Tympanic membrane and external ear normal. No drainage, swelling or tenderness. Tympanic membrane is not perforated, erythematous, retracted or bulging.      Left Ear: Tympanic membrane and external ear normal. No drainage, swelling or tenderness. Tympanic membrane is not perforated, erythematous, retracted or bulging.      Nose: No mucosal edema, congestion or rhinorrhea.      Right Sinus: No maxillary sinus tenderness or frontal sinus tenderness.      Left Sinus: No maxillary sinus tenderness or frontal sinus tenderness.      Mouth/Throat:      Pharynx: No pharyngeal swelling, oropharyngeal exudate, posterior oropharyngeal erythema or uvula swelling.      Tonsils: No tonsillar abscesses.   Eyes:      Pupils: Pupils are equal, round, and reactive to light.   Neck:      Trachea: Trachea normal.   Cardiovascular:      Rate and Rhythm: Normal rate and regular rhythm.      Heart sounds: Normal heart sounds, S1 normal and S2 normal. No murmur heard.     No friction rub. No gallop.   Pulmonary:      Effort: Pulmonary effort is normal. No respiratory distress.      Breath sounds: Normal breath sounds. No decreased breath sounds, wheezing, rhonchi or rales.   Abdominal:      General: Bowel sounds are normal. There is no distension.      Palpations: Abdomen is soft. Abdomen is not rigid. There is no mass.      Tenderness: There is no abdominal tenderness. There is no guarding or rebound.   Musculoskeletal:      Right hand: Normal.      Left hand: Swelling and tenderness present. No deformity or lacerations. Decreased range of motion. Normal  strength. Normal sensation. Normal capillary refill. Normal pulse.      Cervical back: Full passive range of motion without pain, normal range of motion and neck supple.      Comments: Left thumb   Lymphadenopathy:      Cervical: No cervical adenopathy.   Skin:     General: Skin is warm and dry.   Neurological:      Mental Status: She is alert and oriented to person, place, and time.      Cranial Nerves: No cranial nerve deficit.      Deep Tendon Reflexes: Reflexes are normal and symmetric.   Psychiatric:         Behavior: Behavior normal. Behavior is cooperative.         Thought Content: Thought content normal.         Judgment: Judgment normal.             Tyrese Leal PA-C  6/8/2023, 11:20 AM

## 2023-06-29 ENCOUNTER — E-VISIT (OUTPATIENT)
Dept: FAMILY MEDICINE | Facility: CLINIC | Age: 63
End: 2023-06-29

## 2023-06-29 DIAGNOSIS — R35.0 URINARY FREQUENCY: ICD-10-CM

## 2023-06-29 DIAGNOSIS — R30.0 DYSURIA: ICD-10-CM

## 2023-06-29 PROCEDURE — 99421 OL DIG E/M SVC 5-10 MIN: CPT

## 2023-06-29 NOTE — PATIENT INSTRUCTIONS
Dear Lenore Martinez,     After reviewing your responses, I would like you to come in for a urine test to make sure we treat you correctly. This urine test is to evaluate you for a possible urinary tract infection, and should be scheduled for today or tomorrow. Schedule a Lab Only appointment here.     Lab appointments are not available at most locations on the weekends. If no Lab Only appointment is available, you should be seen in any of our convenient Walk-in or Urgent Care Centers, which can be found on our website here.     You will receive instructions with your results in Dipity once they are available.     If your symptoms worsen, you develop pain in your back or stomach, develop fevers, or are not improving in 5 days, please contact your primary care provider for an appointment or visit a Walk-in or Urgent Care Center to be seen.     Thanks again for choosing us as your health care partner,     ANIVAL Tsang CNP

## 2023-06-30 ENCOUNTER — LAB (OUTPATIENT)
Dept: LAB | Facility: CLINIC | Age: 63
End: 2023-06-30
Payer: COMMERCIAL

## 2023-06-30 DIAGNOSIS — R35.0 URINARY FREQUENCY: ICD-10-CM

## 2023-06-30 DIAGNOSIS — R30.0 DYSURIA: ICD-10-CM

## 2023-06-30 LAB
ALBUMIN UR-MCNC: NEGATIVE MG/DL
APPEARANCE UR: CLEAR
BILIRUB UR QL STRIP: NEGATIVE
COLOR UR AUTO: YELLOW
GLUCOSE UR STRIP-MCNC: NEGATIVE MG/DL
HGB UR QL STRIP: NEGATIVE
KETONES UR STRIP-MCNC: NEGATIVE MG/DL
LEUKOCYTE ESTERASE UR QL STRIP: NEGATIVE
NITRATE UR QL: NEGATIVE
PH UR STRIP: 6.5 [PH] (ref 5–7)
SP GR UR STRIP: 1.02 (ref 1–1.03)
UROBILINOGEN UR STRIP-ACNC: 1 E.U./DL

## 2023-06-30 PROCEDURE — 81003 URINALYSIS AUTO W/O SCOPE: CPT

## 2023-07-06 ENCOUNTER — OFFICE VISIT (OUTPATIENT)
Dept: ORTHOPEDICS | Facility: CLINIC | Age: 63
End: 2023-07-06
Payer: COMMERCIAL

## 2023-07-06 VITALS — BODY MASS INDEX: 24.66 KG/M2 | HEIGHT: 65 IN | WEIGHT: 148 LBS

## 2023-07-06 DIAGNOSIS — M65.312 TRIGGER THUMB OF LEFT HAND: Primary | ICD-10-CM

## 2023-07-06 PROCEDURE — 20550 NJX 1 TENDON SHEATH/LIGAMENT: CPT | Mod: LT | Performed by: FAMILY MEDICINE

## 2023-07-06 PROCEDURE — 99204 OFFICE O/P NEW MOD 45 MIN: CPT | Mod: 25 | Performed by: FAMILY MEDICINE

## 2023-07-06 RX ORDER — LIDOCAINE HYDROCHLORIDE 10 MG/ML
0.5 INJECTION, SOLUTION INFILTRATION; PERINEURAL
Status: SHIPPED | OUTPATIENT
Start: 2023-07-06

## 2023-07-06 RX ORDER — BETAMETHASONE SODIUM PHOSPHATE AND BETAMETHASONE ACETATE 3; 3 MG/ML; MG/ML
6 INJECTION, SUSPENSION INTRA-ARTICULAR; INTRALESIONAL; INTRAMUSCULAR; SOFT TISSUE
Status: SHIPPED | OUTPATIENT
Start: 2023-07-06

## 2023-07-06 RX ADMIN — BETAMETHASONE SODIUM PHOSPHATE AND BETAMETHASONE ACETATE 6 MG: 3; 3 INJECTION, SUSPENSION INTRA-ARTICULAR; INTRALESIONAL; INTRAMUSCULAR; SOFT TISSUE at 09:34

## 2023-07-06 RX ADMIN — LIDOCAINE HYDROCHLORIDE 0.5 ML: 10 INJECTION, SOLUTION INFILTRATION; PERINEURAL at 09:34

## 2023-07-06 NOTE — LETTER
7/6/2023         RE: Lenore Martinez  [address] [place] [address]        Dear Colleague,    Thank you for referring your patient, Lenore Martinez, to the [facility]. Please see a copy of my visit note below.    ASSESSMENT & PLAN    Lenore was seen today for pain.    Diagnoses and all orders for this visit:    Trigger thumb of left hand  -     Hand / Upper Extremity Injection/Arthrocentesis: L thumb A1      This issue is acute and Worsening.    # Left Trigger Thumb: Lenore Martinez  was seen today for left thumb pain. Symptoms had been going on for 4 weeks without inciting injury.  She does note clicking which causes her to not want to bend her thumb. On examination there are positive findings of triggering of the A1 pulley on the left first digit. Imaging findings showed CMC arthritis but she does not have any pain at the site on examination. Likely cause of patient's condition due to left trigger thumb. Counseled patient on nature of condition and treatment options.  Given this plan as below, follow-up 1 month as needed, sooner if worsening     Image Findings: Left CMC arthritis on previous x-rays reviewed with patient  Treatment: Activities as tolerated, night splint as needed  Job: As tolerated  Medications/Injections: Limited tylenol/ibuprofen for pain for 1-2 weeks, topical Voltaren gel, left trigger thumb steroid injection  Follow-up: In one month if symptoms do not improve, sooner if worsening  Can consider repeat evaluation, referral to hand surgery versus repeat injection if lasting more than 3 months    Zhang Dempsey MD  [facility]    -----  Chief Complaint   Patient presents with     Left Thumb - Pain       SUBJECTIVE  Lenore Martinez is a/an 63 year old female who is seen as a self referral for evaluation of left thumb pain.     The patient is seen by themselves.  The patient is Right handed    Onset: 6/6/23, 4  "week(s) ago. No injury or accident. Seen in UC 6/8/23 and xrays were performed. Reviewed UC notes.  Location of Pain: left MCP  Worsened by: thumb flexion, gripping   Better with: compression  Treatments tried: compression, thumb brace, heat  ice, Tylenol   Associated symptoms: no distal numbness or tingling; denies swelling or warmth    Orthopedic/Surgical history: NO  Social History/Occupation: PCA    No family history pertinent to patient's problem today.      REVIEW OF SYSTEMS:  Review of Systems  Constitutional, HEENT, cardiovascular, pulmonary, gi and gu systems are negative, except as otherwise noted.    OBJECTIVE:  Ht 1.651 m (5' 5\")   Wt 67.1 kg (148 lb)   BMI 24.63 kg/m     General: healthy, alert and in no distress  HEENT: no scleral icterus or conjunctival erythema  Skin: no suspicious lesions or rash. No jaundice.  CV: distal perfusion intact    Resp: normal respiratory effort without conversational dyspnea   Psych: normal mood and affect  Gait: normal steady gait with appropriate coordination and balance    Neuro: Normal light sensory exam of left upper  extremity     Ortho Exam   LEFT HAND  Inspection:    No swelling, bruising, discoloration, or obvious deformity or asymmetry  Palpation:   Carpals: normal   Metacarpals: normal   Thumb: triggering   Fingers: normal  Range of Motion:    Full active flexion and extension at MCP, PIP, and DIP joints; normal finger cascade without malrotation.  Wrist pronation, supination, and ulnar/radial deviation normal.  Strength:     full, extension full, flexion full, abduction full, adduction full, opposition full  Special Tests:    Positive: palpable triggering left 1st digit A1 pulley    Negative: flexor digitorum superficialis testing, flexor digitorum profundus testing      RADIOLOGY:  I independently, visualized and reviewed these images with the patient     Results for orders placed or performed in visit on 06/08/23   XR Hand Left G/E 3 Views    " Narrative    LEFT HAND THREE OR MORE VIEWS   6/8/2023 11:42 AM     HISTORY:  Pain at base of left thumb after hit door frame. Injury of  left hand, initial encounter.      COMPARISON: None.      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 Mild  osteoarthrosis of several of the interphalangeal joints. There is no  evidence of fracture.    NATALYA MORGAN MD         SYSTEM ID:  [id number]         Review of external notes as documented elsewhere in note  Review of the result(s) of each unique test - left hand x-rays       Disclaimer: This note consists of symbols derived from keyboarding, dictation and/or voice recognition software. As a result, there may be errors in the script that have gone undetected. Please consider this when interpreting information found in this chart.    Hand / Upper Extremity Injection/Arthrocentesis: L thumb A1    Date/Time: 7/6/2023 9:34 AM    Performed by: Zhang Dempsey MD  Authorized by: Zhang Dempsey MD    Indications:  Pain and therapeutic  Needle Size:  25 G  Guidance: landmark    Approach:  Volar  Condition: trigger finger    Location:  Thumb    Site:  L thumb A1  Medications:  6 mg betamethasone acet & sod phos 6 (3-3) MG/ML; 0.5 mL lidocaine 1 %  Medications comment:  Actual amount of celestone used 0.5 mL  Outcome:  Tolerated well, no immediate complications  Procedure discussed: discussed risks, benefits, and alternatives    Consent Given by:  Patient  Timeout: timeout called immediately prior to procedure    Prep: patient was prepped and draped in usual sterile fashion     Ultrasound images of procedure were permanently stored.               Again, thank you for allowing me to participate in the care of your patient.        Sincerely,        Zhang Dempsey MD

## 2023-07-06 NOTE — PROGRESS NOTES
ASSESSMENT & PLAN    Lenore was seen today for pain.    Diagnoses and all orders for this visit:    Trigger thumb of left hand  -     Hand / Upper Extremity Injection/Arthrocentesis: L thumb A1      This issue is acute and Worsening.    # Left Trigger Thumb: Lenore Martinez  was seen today for left thumb pain. Symptoms had been going on for 4 weeks without inciting injury.  She does note clicking which causes her to not want to bend her thumb. On examination there are positive findings of triggering of the A1 pulley on the left first digit. Imaging findings showed CMC arthritis but she does not have any pain at the site on examination. Likely cause of patient's condition due to left trigger thumb. Counseled patient on nature of condition and treatment options.  Given this plan as below, follow-up 1 month as needed, sooner if worsening     Image Findings: Left CMC arthritis on previous x-rays reviewed with patient  Treatment: Activities as tolerated, night splint as needed  Job: As tolerated  Medications/Injections: Limited tylenol/ibuprofen for pain for 1-2 weeks, topical Voltaren gel, left trigger thumb steroid injection  Follow-up: In one month if symptoms do not improve, sooner if worsening  Can consider repeat evaluation, referral to hand surgery versus repeat injection if lasting more than 3 months    Zhang Dempsey MD  [facility] SPORTS MEDICINE [facility]    -----  Chief Complaint   Patient presents with     Left Thumb - Pain       SUBJECTIVE  Lenore Martinez is a/an 63 year old female who is seen as a self referral for evaluation of left thumb pain.     The patient is seen by themselves.  The patient is Right handed    Onset: 6/6/23, 4 week(s) ago. No injury or accident. Seen in UC 6/8/23 and xrays were performed. Reviewed UC notes.  Location of Pain: left MCP  Worsened by: thumb flexion, gripping   Better with: compression  Treatments tried: compression, thumb brace, heat  ice, Tylenol  "  Associated symptoms: no distal numbness or tingling; denies swelling or warmth    Orthopedic/Surgical history: NO  Social History/Occupation: PCA    No family history pertinent to patient's problem today.      REVIEW OF SYSTEMS:  Review of Systems  Constitutional, HEENT, cardiovascular, pulmonary, gi and gu systems are negative, except as otherwise noted.    OBJECTIVE:  Ht 1.651 m (5' 5\")   Wt 67.1 kg (148 lb)   BMI 24.63 kg/m     General: healthy, alert and in no distress  HEENT: no scleral icterus or conjunctival erythema  Skin: no suspicious lesions or rash. No jaundice.  CV: distal perfusion intact    Resp: normal respiratory effort without conversational dyspnea   Psych: normal mood and affect  Gait: normal steady gait with appropriate coordination and balance    Neuro: Normal light sensory exam of left upper  extremity     Ortho Exam   LEFT HAND  Inspection:    No swelling, bruising, discoloration, or obvious deformity or asymmetry  Palpation:   Carpals: normal   Metacarpals: normal   Thumb: triggering   Fingers: normal  Range of Motion:    Full active flexion and extension at MCP, PIP, and DIP joints; normal finger cascade without malrotation.  Wrist pronation, supination, and ulnar/radial deviation normal.  Strength:     full, extension full, flexion full, abduction full, adduction full, opposition full  Special Tests:    Positive: palpable triggering left 1st digit A1 pulley    Negative: flexor digitorum superficialis testing, flexor digitorum profundus testing      RADIOLOGY:  I independently, visualized and reviewed these images with the patient     Results for orders placed or performed in visit on 06/08/23   XR Hand Left G/E 3 Views    Narrative    LEFT HAND THREE OR MORE VIEWS   6/8/2023 11:42 AM     HISTORY:  Pain at base of left thumb after hit door frame. Injury of  left hand, initial encounter.      COMPARISON: None.      Impression    IMPRESSION: Severe osteoarthrosis of the first CMC " joint. Mild  osteoarthrosis of several of the interphalangeal joints. There is no  evidence of fracture.    NATALYA MORGAN MD         SYSTEM ID:  [id number]         Review of external notes as documented elsewhere in note  Review of the result(s) of each unique test - left hand x-rays       Disclaimer: This note consists of symbols derived from keyboarding, dictation and/or voice recognition software. As a result, there may be errors in the script that have gone undetected. Please consider this when interpreting information found in this chart.    Hand / Upper Extremity Injection/Arthrocentesis: L thumb A1    Date/Time: 7/6/2023 9:34 AM    Performed by: Zhang Dempsey MD  Authorized by: Zhang Dempsey MD    Indications:  Pain and therapeutic  Needle Size:  25 G  Guidance: landmark    Approach:  Volar  Condition: trigger finger    Location:  Thumb    Site:  L thumb A1  Medications:  6 mg betamethasone acet & sod phos 6 (3-3) MG/ML; 0.5 mL lidocaine 1 %  Medications comment:  Actual amount of celestone used 0.5 mL  Outcome:  Tolerated well, no immediate complications  Procedure discussed: discussed risks, benefits, and alternatives    Consent Given by:  Patient  Timeout: timeout called immediately prior to procedure    Prep: patient was prepped and draped in usual sterile fashion     Ultrasound images of procedure were permanently stored.

## 2023-07-06 NOTE — PATIENT INSTRUCTIONS
# Left Trigger Thumb: Lenore Martinez  was seen today for left thumb pain. Symptoms had been going on for 4 weeks without inciting injury.  She does note clicking which causes her to not want to bend her thumb. On examination there are positive findings of triggering of the A1 pulley on the left first digit. Imaging findings showed CMC arthritis but she does not have any pain at the site on examination. Likely cause of patient's condition due to left trigger thumb. Counseled patient on nature of condition and treatment options.  Given this plan as below, follow-up 1 month as needed, sooner if worsening     Image Findings: Left CMC arthritis on previous x-rays reviewed with patient  Treatment: Activities as tolerated, night splint as needed  Job: As tolerated  Medications/Injections: Limited tylenol/ibuprofen for pain for 1-2 weeks, topical Voltaren gel, left trigger thumb steroid injection  Follow-up: In one month if symptoms do not improve, sooner if worsening  Can consider repeat evaluation, referral to hand surgery versus repeat injection if lasting more than 3 months    Please call [phone]   Ask for my team if you have any questions or concerns    If you have not yet received the influenza vaccine but would like to get one, please call  [phone] or you can schedule via [employer]    It was great seeing you today!    Zhang Dempsey MD, [facility] Injection Discharge Instructions    Procedure: left trigger thumb steroid injection     You may shower, however avoid swimming, tub baths or hot tubs for 24 hours following your procedure  You may have a mild to moderate increase in pain for several days following the injection.  It may take up to 14 days for the steroid medication to start working although you may feel the effect as early as a few days after the procedure.  You may use ice packs for 10-15 minutes, 3 to 4 times a day at the injection site for comfort  You may use anti-inflammatory  medications (such as Ibuprofen or Aleve or Advil) or Tylenol for pain control if necessary  If you were fasting, you may resume your normal diet and medications after the procedure  If you have diabetes, check your blood sugar more frequently than usual as your blood sugar may be higher than normal for 10-14 days following a steroid injection. Contact your doctor who manages your diabetes if your blood sugar is higher than usual    If you experience any of the following, call [facility] @ [phone] or [phone]  -Fever over 100 degree F  -Swelling, bleeding, redness, drainage, warmth at the injection site  - New or worsening pain

## 2023-07-18 NOTE — PROGRESS NOTES
Virtual Visit Details    Type of service:  Video Visit     Originating Location (pt. Location): Home    Distant Location (provider location):  Off-site  Platform used for Video Visit: [facility]      GASTROENTEROLOGY Follow-up VIDEO VISIT    CC/REFERRING MD:    Sudhakar Ledezma  Referred Self    REASON FOR CONSULTATION:   Referred Self for   Chief Complaint   Patient presents with     RECHECK       HISTORY OF PRESENT ILLNESS:    Lenore Martinez is a 63 year old female who is being evaluated via a billable video visit for 3 month follow up on regurgitation and early satiety. Has been ongoing for 2 years and was progressively worsening. She reported that if she ate more than 1 cup of food, or if she drinks a lot of liquids with meals, she will regurgitate mainly liquids, sometimes undigested foods. This seemed to be worse with soups, mashed potatoes and gravy, and worse if she did not separate food from drink. She has a history of constipation which had improved by the time of our last visit. She was having a bowel movement once every 2-3 days on average.     She is due for her next screening colonoscopy in 11/2023. She underwent gastric emptying study on 5/22/23 which showed delayed gastric emptying with 30% retention at 4 hours. She has an EGD scheduled for 9/7/23.     Today, she reports she has been doing well. Her daughter is a health  interested in dietetics and has been helping her with her gastroparesis diet. She has not met with our GI dietitian (referral is placed) but will consider in the future. She has been limiting fatty foods, proteins, meats, and drinking fluids before eating rather than during or after which seems to help with regurgitation. She has not reduced her fiber intake since she finds this helpful to regulate her bowels. She will still have some effortless regurgitation after meals, mainly of liquids, and occasionally of food several hours after eating. Worse if she bends forward after a  meal. Sometimes she states food will come partially up her throat, causing a tickle or a cough. She denies dysphagia to solids or liquids. Reports very rare nausea, mainly only if she eats fructose. Denies vomiting or abdominal pain. Reports bowel movements are regular and not bothersome.       I have reviewed and updated the patient's Past Medical History, Social History, Family History and Medication List.    Exam:    General appearance:  Healthy appearing adult, in no acute distress  Eyes:  Sclera anicteric  Ears, nose, mouth and throat:  No obvious external lesions of ears and nose.  Hearing intact  Neck:  Symmetric, No obvious external lesions  Respiratory:  Normal respiration, no use of accessory muscles   MSK:  No visual upper extremity, neck or facial muscle atrophy  Psychiatric:  Oriented to person, place and time, Appropriate mood and affect.   Neurologic:  Peripheral muscle function and dexterity appear to be intact      PERTINENT STUDIES have been reviewed.    Gastric Emptying Study 5/22/23:   FINDINGS:   Percent retention at 60 min = 76%.  Percent retention at 120 min = 63%.  Percent retention at 180 min = 42%.  Percent retention at 240 min = 30%.     T 1/2 emptying time =  153 mins.                                                                   IMPRESSION: Delayed gastric emptying      ASSESSMENT/PLAN:    Lenore Martinez is a 63 year old female who presents for follow up of gastroparesis, regurgitation, and early satiety    1. Gastroparesis    Symptoms of regurgitation have somewhat improved since initiating a gastroparesis diet. She has been using the advice of her daughter who is a health  with special interest in dietetics, so she has not met with our GI dietitian. She will consider this for the future. She continues a low fat diet with cooked vegetables and low in meats that are tough for her to digest. Eating smaller meals. Bowel movements are well controlled, no abdominal pain or  vomiting. She has EGD scheduled for September. She does not have a history of diabetes or recent viral infection, autoimmune disease or neurological disease, and does not take medications known to cause gastroparesis, so if EGD results are normal, she likely has idiopathic gastroparesis. She may also have a hiatal hernia or ulceration vs stricturing contributing to gastroparesis which we briefly discussed today.     - EGD scheduled for 9/7/23  - Continue gastroparesis diet   - GI dietitian referral has been placed in case she would like to schedule  - Return after EGD    - Adult GI Clinic Follow-Up Order (Blank); Future        Video-Visit Details  Video Visit Time: 11 minutes  Type of service:  Video Visit  Originating Location (pt. Location): Home        Distant Location (provider location):  Off-site  Platform used for Video Visit: [id number]    33 minutes spent on the date of the encounter doing chart review, history and exam, documentation and further activities as noted above.    Veronica La PA-C  Division of Gastroenterology, Hepatology, and Nutrition  [facility]       RTC 2 months

## 2023-07-19 ENCOUNTER — VIRTUAL VISIT (OUTPATIENT)
Dept: GASTROENTEROLOGY | Facility: CLINIC | Age: 63
End: 2023-07-19
Payer: COMMERCIAL

## 2023-07-19 DIAGNOSIS — K31.84 GASTROPARESIS: Primary | ICD-10-CM

## 2023-07-19 PROCEDURE — 99214 OFFICE O/P EST MOD 30 MIN: CPT | Mod: VID | Performed by: STUDENT IN AN ORGANIZED HEALTH CARE EDUCATION/TRAINING PROGRAM

## 2023-07-19 NOTE — PATIENT INSTRUCTIONS
It was a pleasure taking care of you today.  I've included a brief summary of our discussion and care plan from today's visit below.  Please review this information with your primary care provider.  _______________________________________________________________________     My recommendations are summarized as follows:  -- EGD in September  -- Continue gastroparesis diet: The following information is from [facility] online--     The following suggestions can help minimize symptoms:   1.  Drink enough fluids to prevent dehydration. Dehydration can increase symptoms of nausea. Sip liquids steadily throughout the day; don t gulp. For most adults, fluid needs are 6-10 cups or 0181-0398 ml per day. Liquids can pass through the  stomach more easily and quickly than solids. Liquid nutritional supplements such as Ensure  or Boost  may help you achieve adequate calories and protein.   2.  Eat small, frequent meals. Many people find that frequent small meals (5-6 or more per day)  produce fewer symptoms than large meals.   3.  Eat nutritious foods first before filling up on snacks or empty calories. Some people find they tolerate solids better earlier in the day. Start with solids earlier in day and finish with light or liquid meal in the evening.   4.  Reduce fat intake. Fat naturally slows stomach emptying. Consuming foods labeled  low fat,   nonfat,  or  fat-free  may help with symptoms. Avoid all high fat, fried or greasy foods.  Liquid fat in beverages, however, is often tolerated and is encouraged if you are experiencing  unintentional weight loss. Liquid fat = fats and oils that remain liquid even when refrigerated,  such as vegetable oils, cream, non-dairy cream, half-and-half.   5.  Reduce fiber intake. Fiber slows stomach emptying. High-fiber foods should be avoided because they may remain in your stomach or may cause bezoar formation. A bezoar is a mixture of food fibers that may cause a blockage in your  stomach and prevent it from being able to empty well. A bezoar is similar to a hairball in a cat. Refer to the table below on foods to avoid.   6.  Chew foods well. Chew all food to a mashed potato or pudding consistency. Solid foods such as meat may be tolerated if ground or pureed. If you need to puree your food, many foods can be liquefied in a  or , but solid foods will need to be cut in pieces and thinned with some type of liquid. Here are some suggestions:     Meats, fish, poultry: Blend with broths, water, milk, vegetables or vegetable juice, tomato sauce, gravies. Continued   Starches (potatoes, pasta): Blend with water, tomato juice, broths, or strained baby vegetables.  Fruits: Blend with their own juices, other fruit juices, or strained baby fruits.     Mixed dishes (such as lasagna, macaroni and cheese, spaghetti, chili, chop suey): add adequate liquid of your choice, blend well, and strain if necessary.     If you do not have a , strained baby foods will work and can be thinned down as needed with milk, soy milk, rice milk, broth, etc.     Always clean your  after each meal: take apart, unscrew  base and blade, and wash, as food will accumulate.     At meals, take pureed foods and liquid supplements before coffee, tea, or carbonated beverages.   7. Sit up while eating and for at least 1 hour after finishing your meal; don t lay down.   8.  If you have diabetes, keep your blood sugar under control. Call your doctor with any questions  or concerns and work with your registered dietitian to follow a carbohydrate budget for the day. Keeping your blood sugars in goal ranges (before and after meals) may decrease gastroparesis problems. High blood sugars directly interfere with normal stomach emptying.   9. Alcohol should be avoided, since it can also impair gastric emptying.   10. Exercise has been shown to increase stomach emptying in healthy individuals and might  improve symptoms. Walking after meals is recommended.      ______________________________________________________________________     How do I schedule labs, imaging studies, or procedures that were ordered in clinic today?      Labs: To schedule lab appointment you can contact your local [facility] or call [phone] to schedule at any convenient [facility] location.      Procedures: If a colonoscopy, upper endoscopy, breath test, esophageal manometry, or pH impedence was ordered today, our endoscopy team will call you to schedule this. If you have not heard from our endoscopy team within a week, please call [phone] to schedule.      Imaging Studies: If you were scheduled for a CT scan, X-ray, MRI, ultrasound, HIDA scan or other imaging study, please call [phone] to have this scheduled.      Referral: If a referral to another specialty was ordered, expect a phone call or follow instructions above. If you have not heard from anyone regarding your referral in a week, please call our clinic to check the status.      Who do I call with any questions after my visit?  Please be in touch if there are any further questions that arise following today's visit.  There are multiple ways to contact your gastroenterology care team.       During business hours, you may reach a Gastroenterology nurse at [phone]     To schedule or reschedule an appointment, please call [phone].      You can always send a secure message through [employer].  [employer] messages are answered by your nurse or doctor typically within 24 hours.  Please allow extra time on weekends and holidays.       For urgent/emergent questions after business hours, you may reach the on-call GI Fellow by contacting the [facility]  at [phone].     How will I get the results of any tests ordered?    You will receive all of your results.  If you have signed up for [employer], any tests ordered at your visit will be  available to you after your physician reviews them.  Typically this takes 1-2 weeks.  If there are urgent results that require a change in your care plan, your physician or nurse will call you to discuss the next steps.       What is [employer]?  [employer] is a secure way for you to access all of your healthcare records from the [facility].  It is a web based computer program, so you can sign on to it from any location.  It also allows you to send secure messages to your care team.  I recommend signing up for [employer] access if you have not already done so and are comfortable with using a computer.       How to I schedule a follow-up visit?  If you did not schedule a follow-up visit today, please call [phone] to schedule a follow-up office visit.

## 2023-07-19 NOTE — LETTER
7/19/2023         RE: Lenore Martinez  [address]  [place] [address]        Dear Colleague,    Thank you for referring your patient, Lenore Martinez, to the [facility] [place]. Please see a copy of my visit note below.          GASTROENTEROLOGY Follow-up VIDEO VISIT    CC/REFERRING MD:    Sudhakar Ledezma  Referred Self    REASON FOR CONSULTATION:   Referred Self for   Chief Complaint   Patient presents with    RECHECK       HISTORY OF PRESENT ILLNESS:    Lenore Martinez is a 63 year old female who is being evaluated via a billable video visit for 3 month follow up on regurgitation and early satiety. Has been ongoing for 2 years and was progressively worsening. She reported that if she ate more than 1 cup of food, or if she drinks a lot of liquids with meals, she will regurgitate mainly liquids, sometimes undigested foods. This seemed to be worse with soups, mashed potatoes and gravy, and worse if she did not separate food from drink. She has a history of constipation which had improved by the time of our last visit. She was having a bowel movement once every 2-3 days on average.     She is due for her next screening colonoscopy in 11/2023. She underwent gastric emptying study on 5/22/23 which showed delayed gastric emptying with 30% retention at 4 hours. She has an EGD scheduled for 9/7/23.     Today, she reports she has been doing well. Her daughter is a health  interested in dietetics and has been helping her with her gastroparesis diet. She has not met with our GI dietitian (referral is placed) but will consider in the future. She has been limiting fatty foods, proteins, meats, and drinking fluids before eating rather than during or after which seems to help with regurgitation. She has not reduced her fiber intake since she finds this helpful to regulate her bowels. She will still have some effortless regurgitation after meals, mainly of liquids, and occasionally  of food several hours after eating. Worse if she bends forward after a meal. Sometimes she states food will come partially up her throat, causing a tickle or a cough. She denies dysphagia to solids or liquids. Reports very rare nausea, mainly only if she eats fructose. Denies vomiting or abdominal pain. Reports bowel movements are regular and not bothersome.       I have reviewed and updated the patient's Past Medical History, Social History, Family History and Medication List.    Exam:    General appearance:  Healthy appearing adult, in no acute distress  Eyes:  Sclera anicteric  Ears, nose, mouth and throat:  No obvious external lesions of ears and nose.  Hearing intact  Neck:  Symmetric, No obvious external lesions  Respiratory:  Normal respiration, no use of accessory muscles   MSK:  No visual upper extremity, neck or facial muscle atrophy  Psychiatric:  Oriented to person, place and time, Appropriate mood and affect.   Neurologic:  Peripheral muscle function and dexterity appear to be intact      PERTINENT STUDIES have been reviewed.    Gastric Emptying Study 5/22/23:   FINDINGS:   Percent retention at 60 min = 76%.  Percent retention at 120 min = 63%.  Percent retention at 180 min = 42%.  Percent retention at 240 min = 30%.     T 1/2 emptying time =  153 mins.                                                                   IMPRESSION: Delayed gastric emptying      ASSESSMENT/PLAN:    Lenore Martinez is a 63 year old female who presents for follow up of gastroparesis, regurgitation, and early satiety    1. Gastroparesis    Symptoms of regurgitation have somewhat improved since initiating a gastroparesis diet. She has been using the advice of her daughter who is a health  with special interest in dietetics, so she has not met with our GI dietitian. She will consider this for the future. She continues a low fat diet with cooked vegetables and low in meats that are tough for her to digest. Eating smaller  meals. Bowel movements are well controlled, no abdominal pain or vomiting. She has EGD scheduled for September. She does not have a history of diabetes or recent viral infection, autoimmune disease or neurological disease, and does not take medications known to cause gastroparesis, so if EGD results are normal, she likely has idiopathic gastroparesis. She may also have a hiatal hernia or ulceration vs stricturing contributing to gastroparesis which we briefly discussed today.     - EGD scheduled for 9/7/23  - Continue gastroparesis diet   - GI dietitian referral has been placed in case she would like to schedule  - Return after EGD    - Adult GI Clinic Follow-Up Order (Blank); Future      33 minutes spent on the date of the encounter doing chart review, history and exam, documentation and further activities as noted above.      RTC 2 months      Again, thank you for allowing me to participate in the care of your patient.      Sincerely,    Veronica La PA-C

## 2023-07-26 ENCOUNTER — TELEPHONE (OUTPATIENT)
Dept: GASTROENTEROLOGY | Facility: CLINIC | Age: 63
End: 2023-07-26
Payer: COMMERCIAL

## 2023-07-26 NOTE — TELEPHONE ENCOUNTER
Spoke with patient and scheduled Veronica La's follow-up order. Patient is scheduled for video visit on 9/20/23 with Veronica La for follow-up after completion of procedure.

## 2023-07-27 ENCOUNTER — PATIENT OUTREACH (OUTPATIENT)
Dept: CARE COORDINATION | Facility: CLINIC | Age: 63
End: 2023-07-27
Payer: COMMERCIAL

## 2023-08-07 ENCOUNTER — TELEPHONE (OUTPATIENT)
Dept: GASTROENTEROLOGY | Facility: CLINIC | Age: 63
End: 2023-08-07
Payer: COMMERCIAL

## 2023-08-07 NOTE — TELEPHONE ENCOUNTER
Caller: Lenore  Reason for Reschedule/Cancellation (please be detailed, any staff messages or encounters to note?): Patel out       Prior to reschedule please review:  Ordering Provider:     Veronica La PA-C in [facility]     Sedation per order: MAC  Does patient have any ASC Exclusions, please identify?: n      Notes on Cancelled Procedure:  Procedure: Upper Endoscopy [EGD]   Date: 09/07/2023  Location: [facility]; [address] [place] [address]  Surgeon: Liana      Rescheduled: Yes  Procedure: Upper Endoscopy [EGD]   Date: 10/19/2023  Location: [facility]; [address] [place] [address]  Surgeon: Apple  Sedation Level Scheduled  MAC,  Reason for Sedation Level per order  Prep/Instructions updated and sent: y     Send In - basket message to Panc - Adryan Pool if EUS  procedure is canceled or rescheduled: [ N/A, YES or NO] na

## 2023-08-10 ENCOUNTER — PATIENT OUTREACH (OUTPATIENT)
Dept: CARE COORDINATION | Facility: CLINIC | Age: 63
End: 2023-08-10
Payer: COMMERCIAL

## 2023-08-20 ENCOUNTER — HEALTH MAINTENANCE LETTER (OUTPATIENT)
Age: 63
End: 2023-08-20

## 2023-09-26 NOTE — PROGRESS NOTES
ASSESSMENT & PLAN    Lenore was seen today for follow up.    Diagnoses and all orders for this visit:    Arthritis of carpometacarpal (CMC) joint of left thumb  -     Small Joint Injection/Arthrocentesis: L thumb CMC        # Left Thumb CMC Arthritis: Lenore Martinez  was seen today for left thumb pain.  She was seen on 7/6/2023 where a left trigger thumb injection was completed that helped her symptoms after stopping pain of the CMC joint.  On examination she is dominant tenderness palpation of the CMC joint.  Previous x-rays showing arthritis at this joint.  Likely cause her pain due to flare of CMC arthritis.  Given this plan to treat as below and follow-up if not improving.    Image Findings: Left CMC arthritis on previous x-rays reviewed with patient  Treatment: Activities as tolerated, thumb brace as needed  Job: As tolerated  Medications/Injections: Limited tylenol/ibuprofen for pain for 1-2 weeks, topical Voltaren gel, left thumb CMC joint steroid injection  Follow-up: In one month if symptoms do not improve, sooner if worsening  Can consider repeat evaluation, referral to hand surgery versus repeat injection if lasting more than 3 months    I was present with the resident during the history and exam.  I discussed the case with the resident and agree with the findings as documented in the assessment and plan.     -----    SUBJECTIVE:  Lenore Martinze is a 63 year old female who is seen in follow-up for left thumb pain.They were last seen 7/6/2023 and left thumb trigger finger injection was performed.  The patient is seen by themselves.    Since their last visit reports that she is having increased pain over the CMC, but does note that there is no pain within the phalange. Does feel like last injection was beneficial and is still having relief. She just recently started having some triggering infrequently in her left first digit.  They indicate that their current pain level is 5/10. They have tried left thumb  "trigger finger injection 7/6/23, compression, thumb brace, heat  ice, Tylenol.       Patient's past medical, surgical, social, and family histories were reviewed today and no changes are noted.    REVIEW OF SYSTEMS:  Constitutional: NEGATIVE for fever, chills, change in weight  Skin: NEGATIVE for worrisome rashes, moles or lesions  GI/: NEGATIVE for bowel or bladder changes  Neuro: NEGATIVE for weakness, dizziness or paresthesias    OBJECTIVE:  Ht 1.651 m (5' 5\")   Wt 67.1 kg (148 lb)   BMI 24.63 kg/m     General: healthy, alert and in no distress  HEENT: no scleral icterus or conjunctival erythema  Skin: no suspicious lesions or rash. No jaundice.  CV: regular rhythm by palpation, no pedal edema  Resp: normal respiratory effort without conversational dyspnea   Psych: normal mood and affect  Gait: normal steady gait with appropriate coordination and balance  Neuro: normal light touch sensory exam of the extremities.    MSK:    LEFT HAND  Inspection:    No swelling, bruising, discoloration, or obvious deformity or asymmetry  Palpation:   Carpals: scaphoid tender    Metacarpals: normal   Thumb: normal, no triggering   Fingers: normal  Range of Motion:    Full active flexion and extension at MCP, PIP, and DIP joints; normal finger cascade without malrotation.  Wrist pronation, supination, and ulnar/radial deviation normal.  Strength:     full, abduction full, adduction full, opposition full  Special Tests:    Positive: CMC grind    Negative: palpable triggering at left first digit     Independent visualization of the below image:  Left hand x-rays, CMC arthritis    Zhang Dempsey MD, [facility]    Disclaimer: This note consists of symbols derived from keyboarding, dictation and/or voice recognition software. As a result, there may be errors in the script that have gone undetected. Please consider this when interpreting information found in this chart.      Small Joint " Injection/Arthrocentesis: L thumb CMC    Date/Time: 9/28/2023 11:10 AM    Performed by: Zhang Dempsey MD  Authorized by: Zhang Dempsey MD    Needle Size:  25 G  Guidance: ultrasound     Approach:  Radial  Location:  Thumb    Site:  L thumb CMC                    Medications:  6 mg betamethasone acet & sod phos 6 (3-3) MG/ML; 1 mL ROPivacaine 5 MG/ML        Outcome:  Tolerated well, no immediate complications  Procedure discussed: discussed risks, benefits, and alternatives      Timeout: timeout called immediately prior to procedure    Prep: patient was prepped and draped in usual sterile fashion        Ultrasound images of procedure were permanently stored.    Patient reported significant improvement of pain after the numbing portion left thumb CMC injection.  Ultrasound guided images were permanently stored.   Aftercare instructions given to patient.  Plan to follow-up as discussed above.     Zhang Dempsey MD [facility]

## 2023-09-28 ENCOUNTER — OFFICE VISIT (OUTPATIENT)
Dept: ORTHOPEDICS | Facility: CLINIC | Age: 63
End: 2023-09-28
Payer: COMMERCIAL

## 2023-09-28 VITALS — HEIGHT: 65 IN | WEIGHT: 148 LBS | BODY MASS INDEX: 24.66 KG/M2

## 2023-09-28 DIAGNOSIS — M18.12 ARTHRITIS OF CARPOMETACARPAL (CMC) JOINT OF LEFT THUMB: Primary | ICD-10-CM

## 2023-09-28 PROCEDURE — 20604 DRAIN/INJ JOINT/BURSA W/US: CPT | Mod: LT | Performed by: FAMILY MEDICINE

## 2023-09-28 RX ORDER — ROPIVACAINE HYDROCHLORIDE 5 MG/ML
1 INJECTION, SOLUTION EPIDURAL; INFILTRATION; PERINEURAL
Status: SHIPPED | OUTPATIENT
Start: 2023-09-28

## 2023-09-28 RX ORDER — BETAMETHASONE SODIUM PHOSPHATE AND BETAMETHASONE ACETATE 3; 3 MG/ML; MG/ML
6 INJECTION, SUSPENSION INTRA-ARTICULAR; INTRALESIONAL; INTRAMUSCULAR; SOFT TISSUE
Status: SHIPPED | OUTPATIENT
Start: 2023-09-28

## 2023-09-28 RX ADMIN — ROPIVACAINE HYDROCHLORIDE 1 ML: 5 INJECTION, SOLUTION EPIDURAL; INFILTRATION; PERINEURAL at 11:10

## 2023-09-28 RX ADMIN — BETAMETHASONE SODIUM PHOSPHATE AND BETAMETHASONE ACETATE 6 MG: 3; 3 INJECTION, SUSPENSION INTRA-ARTICULAR; INTRALESIONAL; INTRAMUSCULAR; SOFT TISSUE at 11:10

## 2023-09-28 NOTE — LETTER
9/28/2023         RE: Lenore Martinez  [address]  [place] [address]        Dear Colleague,    Thank you for referring your patient, Lenore Martinez, to the [facility] [place]. Please see a copy of my visit note below.    ASSESSMENT & PLAN    Lenore was seen today for follow up.    Diagnoses and all orders for this visit:    Arthritis of carpometacarpal (CMC) joint of left thumb  -     Small Joint Injection/Arthrocentesis: L thumb CMC        # Left Thumb CMC Arthritis: Lenore Martinez  was seen today for left thumb pain.  She was seen on 7/6/2023 where a left trigger thumb injection was completed that helped her symptoms after stopping pain of the CMC joint.  On examination she is dominant tenderness palpation of the CMC joint.  Previous x-rays showing arthritis at this joint.  Likely cause her pain due to flare of CMC arthritis.  Given this plan to treat as below and follow-up if not improving.    Image Findings: Left CMC arthritis on previous x-rays reviewed with patient  Treatment: Activities as tolerated, thumb brace as needed  Job: As tolerated  Medications/Injections: Limited tylenol/ibuprofen for pain for 1-2 weeks, topical Voltaren gel, left thumb CMC joint steroid injection  Follow-up: In one month if symptoms do not improve, sooner if worsening  Can consider repeat evaluation, referral to hand surgery versus repeat injection if lasting more than 3 months    I was present with the resident during the history and exam.  I discussed the case with the resident and agree with the findings as documented in the assessment and plan.     -----    SUBJECTIVE:  Lenore Martinez is a 63 year old female who is seen in follow-up for left thumb pain.They were last seen 7/6/2023 and left thumb trigger finger injection was performed.  The patient is seen by themselves.    Since their last visit reports that she is having increased pain over the CMC, but does note that there is no pain  "within the phalange. Does feel like last injection was beneficial and is still having relief. She just recently started having some triggering infrequently in her left first digit.  They indicate that their current pain level is 5/10. They have tried left thumb trigger finger injection 7/6/23, compression, thumb brace, heat  ice, Tylenol.       Patient's past medical, surgical, social, and family histories were reviewed today and no changes are noted.    REVIEW OF SYSTEMS:  Constitutional: NEGATIVE for fever, chills, change in weight  Skin: NEGATIVE for worrisome rashes, moles or lesions  GI/: NEGATIVE for bowel or bladder changes  Neuro: NEGATIVE for weakness, dizziness or paresthesias    OBJECTIVE:  Ht 1.651 m (5' 5\")   Wt 67.1 kg (148 lb)   BMI 24.63 kg/m     General: healthy, alert and in no distress  HEENT: no scleral icterus or conjunctival erythema  Skin: no suspicious lesions or rash. No jaundice.  CV: regular rhythm by palpation, no pedal edema  Resp: normal respiratory effort without conversational dyspnea   Psych: normal mood and affect  Gait: normal steady gait with appropriate coordination and balance  Neuro: normal light touch sensory exam of the extremities.    MSK:    LEFT HAND  Inspection:    No swelling, bruising, discoloration, or obvious deformity or asymmetry  Palpation:   Carpals: scaphoid tender    Metacarpals: normal   Thumb: normal, no triggering   Fingers: normal  Range of Motion:    Full active flexion and extension at MCP, PIP, and DIP joints; normal finger cascade without malrotation.  Wrist pronation, supination, and ulnar/radial deviation normal.  Strength:     full, abduction full, adduction full, opposition full  Special Tests:    Positive: CMC grind    Negative: palpable triggering at left first digit     Independent visualization of the below image:  Left hand x-rays, CMC arthritis    Zhang Dempsey MD, [facility]    Disclaimer: This note " consists of symbols derived from keyboarding, dictation and/or voice recognition software. As a result, there may be errors in the script that have gone undetected. Please consider this when interpreting information found in this chart.      Small Joint Injection/Arthrocentesis: L thumb CMC    Date/Time: 9/28/2023 11:10 AM    Performed by: Zhang Dempsey MD  Authorized by: Zhang Dempsey MD    Needle Size:  25 G  Guidance: ultrasound     Approach:  Radial  Location:  Thumb    Site:  L thumb CMC                    Medications:  6 mg betamethasone acet & sod phos 6 (3-3) MG/ML; 1 mL ROPivacaine 5 MG/ML        Outcome:  Tolerated well, no immediate complications  Procedure discussed: discussed risks, benefits, and alternatives      Timeout: timeout called immediately prior to procedure    Prep: patient was prepped and draped in usual sterile fashion        Ultrasound images of procedure were permanently stored.    Patient reported significant improvement of pain after the numbing portion left thumb CMC injection.  Ultrasound guided images were permanently stored.   Aftercare instructions given to patient.  Plan to follow-up as discussed above.     Zhang Dempsey MD [facility]              Again, thank you for allowing me to participate in the care of your patient.        Sincerely,        Zhang Dempsey MD

## 2023-09-28 NOTE — PATIENT INSTRUCTIONS
# Left Thumb CMC Arthritis: Lenore Martinez  was seen today for left thumb pain.  She was seen on 7/6/2023 where a left trigger thumb injection was completed that helped her symptoms after stopping pain of the CMC joint.  On examination she is dominant tenderness palpation of the CMC joint.  Previous x-rays showing arthritis at this joint.  Likely cause her pain due to flare of CMC arthritis.  Given this plan to treat as below and follow-up if not improving.    Image Findings: Left CMC arthritis on previous x-rays reviewed with patient  Treatment: Activities as tolerated, thumb brace as needed  Job: As tolerated  Medications/Injections: Limited tylenol/ibuprofen for pain for 1-2 weeks, topical Voltaren gel, left thumb CMC joint steroid injection  Follow-up: In one month if symptoms do not improve, sooner if worsening  Can consider repeat evaluation, referral to hand surgery versus repeat injection if lasting more than 3 months    Please call [phone]   Ask for my team if you have any questions or concerns    If you have not yet received the influenza vaccine but would like to get one, please call  [phone] or you can schedule via [employer]    It was great seeing you again today!    Zhang Dempsey MD, CAM     [facility] Injection Discharge Instructions    Procedure: left CMC joint steroid injection     You may shower, however avoid swimming, tub baths or hot tubs for 24 hours following your procedure  You may have a mild to moderate increase in pain for several days following the injection.  It may take up to 14 days for the steroid medication to start working although you may feel the effect as early as a few days after the procedure.  You may use ice packs for 10-15 minutes, 3 to 4 times a day at the injection site for comfort  You may use anti-inflammatory medications (such as Ibuprofen or Aleve or Advil) or Tylenol for pain control if necessary  If you were fasting, you may resume your normal diet and  medications after the procedure  If you have diabetes, check your blood sugar more frequently than usual as your blood sugar may be higher than normal for 10-14 days following a steroid injection. Contact your doctor who manages your diabetes if your blood sugar is higher than usual    If you experience any of the following, call [facility] @ [phone] or [phone]  -Fever over 100 degree F  -Swelling, bleeding, redness, drainage, warmth at the injection site  - New or worsening pain    BraceUP Thumb Spica Splint Brace Right Left Hand Women and Men, CMC with Thumb Support, for Arthritis,

## 2023-10-02 NOTE — TELEPHONE ENCOUNTER
Hypertension is improving with treatment.  Continue current treatment regimen.  Regular aerobic exercise.  Stop smoking.  Blood pressure will be reassessed at the next regular appointment.   Patient called stating she has belly pain around her belly button and it is painful to the touch.  Symptoms started last week.  Patient states she is starting to feel nauseous.     Provider E-Visit time total (minutes): 8

## 2023-10-05 ENCOUNTER — TELEPHONE (OUTPATIENT)
Dept: GASTROENTEROLOGY | Facility: CLINIC | Age: 63
End: 2023-10-05
Payer: COMMERCIAL

## 2023-10-05 NOTE — TELEPHONE ENCOUNTER
Pre visit planning completed.      Procedure details:    Patient scheduled for Upper endoscopy (EGD) on 10/19/2023.     Arrival time: 0700. Procedure time 0830    Pre op exam needed? N/A    Facility location: [facility]; [address] [place] [address], [address] Floor, [place] [address]    Sedation type: MAC    Indication for procedure: Colon adenoma [D12.6]       Chart review:     Electronic implanted devices? No    Diabetic? No    Diabetic medication HOLDING recommendations: (if applicable)  Oral diabetic medications: N/A  Diabetic injectables: N/A  Insulin: N/A      Medication review:    Anticoagulants? No    NSAIDS? No NSAID medications per patient's medication list.  RN will verify with pre-assessment call.    Other medication HOLDING recommendations:  N/A      Prep for procedure:     Bowel prep recommendation: N/A     Prep instructions sent via jv Patel RN  Endoscopy Procedure Pre Assessment RN  [phone] option 4

## 2023-10-05 NOTE — TELEPHONE ENCOUNTER
Pre assessment completed for upcoming procedure.   (Please see previous telephone encounter notes for complete details)       Procedure details:    Arrival time and facility location reviewed.    Pre op exam needed? N/A    Designated  policy reviewed. Instructed to have someone stay 24 hours post procedure.     COVID policy reviewed.      Medication review:    Medications reviewed. Please see supporting documentation below. Holding recommendations discussed (if applicable).       Prep for procedure:     Procedure prep instructions reviewed.        Additional information needed?  N/A      Patient  verbalized understanding and had no questions or concerns at this time.      Dennise Patel RN  Endoscopy Procedure Pre Assessment RN  [phone] option 4

## 2023-10-14 DIAGNOSIS — N30.90 BLADDER INFECTION: ICD-10-CM

## 2023-10-17 RX ORDER — NITROFURANTOIN 25; 75 MG/1; MG/1
CAPSULE ORAL
Qty: 10 CAPSULE | Refills: 0 | OUTPATIENT
Start: 2023-10-17

## 2023-10-17 NOTE — TELEPHONE ENCOUNTER
Pls call pt. Pt needs an appt. This isn't typically a long term/refill medication and we have not discussed recurrent UTI's. If she is having UTI symptoms she can do an e-visit, virtual, or inperson.

## 2023-10-18 ENCOUNTER — ANESTHESIA EVENT (OUTPATIENT)
Dept: GASTROENTEROLOGY | Facility: CLINIC | Age: 63
End: 2023-10-18
Payer: COMMERCIAL

## 2023-10-18 NOTE — ANESTHESIA PREPROCEDURE EVALUATION
Anesthesia Pre-Procedure Evaluation    Patient: Lenore Martinez   MRN: [id number] : 1960        Procedure : Procedure(s):  Esophagoscopy, gastroscopy, duodenoscopy (EGD), combined          Past Medical History:   Diagnosis Date    Anxiety     Bipolar disease, chronic (H)     Diabetic neuropathy (H) 2021    Hyperlipidemia LDL goal <70     Hypovitaminosis D 2014    Iron deficiency     Migraines     Neuropathy     PTSD (post-traumatic stress disorder) 2013    Diagnosed 2005      Past Surgical History:   Procedure Laterality Date    COLONOSCOPY N/A 2022    Procedure: COLONOSCOPY, WITH POLYPECTOMY With Clips;  Surgeon: Darien Norris MD;  Location: [facility] OR    HC REMOVAL OF OVARIAN CYST(S)      HC TOOTH EXTRACTION W/FORCEP      HYSTERECTOMY, PAP NO LONGER INDICATED  10/1/2009    has ovaries    MYRINGOTOMY      as  a child      Allergies   Allergen Reactions    Compazine      Anxiety        Social History     Tobacco Use    Smoking status: Never    Smokeless tobacco: Never    Tobacco comments:     smoke free household.   Substance Use Topics    Alcohol use: Yes     Comment: 1-2 a month      Wt Readings from Last 1 Encounters:   23 67.1 kg (148 lb)        Anesthesia Evaluation            ROS/MED HX  ENT/Pulmonary:       Neurologic:     (+)    peripheral neuropathy,                            Cardiovascular:       METS/Exercise Tolerance:     Hematologic:       Musculoskeletal:       GI/Hepatic:       Renal/Genitourinary:       Endo:     (+)  type II DM,                    Psychiatric/Substance Use:       Infectious Disease:       Malignancy:       Other:            Physical Exam    Airway        Mallampati: II   TM distance: > 3 FB   Neck ROM: full   Mouth opening: > 3 cm    Respiratory Devices and Support         Dental       (+) Completely normal teeth      Cardiovascular   cardiovascular exam normal       Rhythm and rate: regular and normal     Pulmonary   pulmonary exam  "normal        breath sounds clear to auscultation           OUTSIDE LABS:  CBC:   Lab Results   Component Value Date    WBC 5.1 03/12/2015    WBC 4.4 05/12/2014    HGB 13.3 03/12/2015    HGB 12.9 05/12/2014    HCT 40.5 03/12/2015    HCT 37.6 05/12/2014     03/12/2015     05/12/2014     BMP:   Lab Results   Component Value Date     05/10/2023     08/26/2022    POTASSIUM 4.6 05/10/2023    POTASSIUM 4.2 08/26/2022    CHLORIDE 103 05/10/2023    CHLORIDE 107 08/26/2022    CO2 27 05/10/2023    CO2 26 08/26/2022    BUN 10.7 05/10/2023    BUN 13 08/26/2022    CR 0.90 05/10/2023    CR 0.85 08/26/2022     (H) 05/10/2023     (H) 11/04/2022     COAGS: No results found for: \"PTT\", \"INR\", \"FIBR\"  POC: No results found for: \"BGM\", \"HCG\", \"HCGS\"  HEPATIC:   Lab Results   Component Value Date    ALBUMIN 4.7 10/25/2022    PROTTOTAL 6.6 10/25/2022    ALT 14 10/25/2022    AST 15 10/25/2022    ALKPHOS 77 10/25/2022    BILITOTAL 0.7 10/25/2022    BILIDIRECT 0.5 03/09/2012     OTHER:   Lab Results   Component Value Date    A1C 5.6 05/10/2023    GABRIEL 9.3 05/10/2023    TSH 3.63 08/26/2022    T4 0.81 05/12/2014       Anesthesia Plan    ASA Status:  3    NPO Status:  NPO Appropriate    Anesthesia Type: MAC.     - Reason for MAC: immobility needed, straight local not clinically adequate   Induction: Intravenous.   Maintenance: TIVA.        Consents    Anesthesia Plan(s) and associated risks, benefits, and realistic alternatives discussed. Questions answered and patient/representative(s) expressed understanding.     - Discussed: Risks, Benefits and Alternatives for the PROCEDURE were discussed     - Discussed with:  Patient            Postoperative Care    Pain management: IV analgesics.   PONV prophylaxis: Ondansetron (or other 5HT-3), Promethazine or metoclopramide     Comments:                Deric Ruiz MD  "

## 2023-10-18 NOTE — TELEPHONE ENCOUNTER
RN left  for patient requesting call back to clinic.    RN called to relay providers message.    Josef Melton RN, BSN, PHN  [facility]

## 2023-10-18 NOTE — TELEPHONE ENCOUNTER
Pt reports that the pharmacy sent this request and she called them to cancel      No further action needed      Carmen RN    Triage Nurse  [facility]

## 2023-10-19 ENCOUNTER — ANESTHESIA (OUTPATIENT)
Dept: GASTROENTEROLOGY | Facility: CLINIC | Age: 63
End: 2023-10-19
Payer: COMMERCIAL

## 2023-10-19 ENCOUNTER — HOSPITAL ENCOUNTER (OUTPATIENT)
Facility: CLINIC | Age: 63
Discharge: HOME | End: 2023-10-19
Attending: INTERNAL MEDICINE | Admitting: INTERNAL MEDICINE
Payer: COMMERCIAL

## 2023-10-19 VITALS
TEMPERATURE: 97.8 F | SYSTOLIC BLOOD PRESSURE: 103 MMHG | OXYGEN SATURATION: 97 % | RESPIRATION RATE: 16 BRPM | DIASTOLIC BLOOD PRESSURE: 69 MMHG | HEART RATE: 64 BPM

## 2023-10-19 LAB — UPPER GI ENDOSCOPY: NORMAL

## 2023-10-19 PROCEDURE — 250N000009 HC RX 250

## 2023-10-19 PROCEDURE — 258N000003 HC RX IP 258 OP 636: Performed by: NURSE ANESTHETIST, CERTIFIED REGISTERED

## 2023-10-19 PROCEDURE — 43235 EGD DIAGNOSTIC BRUSH WASH: CPT | Performed by: INTERNAL MEDICINE

## 2023-10-19 PROCEDURE — 370N000017 HC ANESTHESIA TECHNICAL FEE, PER MIN: Performed by: INTERNAL MEDICINE

## 2023-10-19 PROCEDURE — 250N000011 HC RX IP 250 OP 636

## 2023-10-19 RX ORDER — NALOXONE HYDROCHLORIDE 0.4 MG/ML
0.4 INJECTION, SOLUTION INTRAMUSCULAR; INTRAVENOUS; SUBCUTANEOUS
Status: CANCELLED | OUTPATIENT
Start: 2023-10-19

## 2023-10-19 RX ORDER — ONDANSETRON 2 MG/ML
4 INJECTION INTRAMUSCULAR; INTRAVENOUS EVERY 30 MIN PRN
Status: DISCONTINUED | OUTPATIENT
Start: 2023-10-19 | End: 2023-10-19 | Stop reason: HOSPADM

## 2023-10-19 RX ORDER — ONDANSETRON 4 MG/1
4 TABLET, ORALLY DISINTEGRATING ORAL EVERY 6 HOURS PRN
Status: CANCELLED | OUTPATIENT
Start: 2023-10-19

## 2023-10-19 RX ORDER — PROCHLORPERAZINE MALEATE 5 MG
10 TABLET ORAL EVERY 6 HOURS PRN
Status: CANCELLED | OUTPATIENT
Start: 2023-10-19

## 2023-10-19 RX ORDER — OXYCODONE HYDROCHLORIDE 5 MG/1
5 TABLET ORAL
Status: DISCONTINUED | OUTPATIENT
Start: 2023-10-19 | End: 2023-10-19 | Stop reason: HOSPADM

## 2023-10-19 RX ORDER — PROPOFOL 10 MG/ML
INJECTION, EMULSION INTRAVENOUS PRN
Status: DISCONTINUED | OUTPATIENT
Start: 2023-10-19 | End: 2023-10-19

## 2023-10-19 RX ORDER — ONDANSETRON 2 MG/ML
4 INJECTION INTRAMUSCULAR; INTRAVENOUS
Status: DISCONTINUED | OUTPATIENT
Start: 2023-10-19 | End: 2023-10-19 | Stop reason: HOSPADM

## 2023-10-19 RX ORDER — SODIUM CHLORIDE, SODIUM LACTATE, POTASSIUM CHLORIDE, CALCIUM CHLORIDE 600; 310; 30; 20 MG/100ML; MG/100ML; MG/100ML; MG/100ML
INJECTION, SOLUTION INTRAVENOUS CONTINUOUS
Status: DISCONTINUED | OUTPATIENT
Start: 2023-10-19 | End: 2023-10-19 | Stop reason: HOSPADM

## 2023-10-19 RX ORDER — OXYCODONE HYDROCHLORIDE 10 MG/1
10 TABLET ORAL
Status: DISCONTINUED | OUTPATIENT
Start: 2023-10-19 | End: 2023-10-19 | Stop reason: HOSPADM

## 2023-10-19 RX ORDER — LIDOCAINE HYDROCHLORIDE 20 MG/ML
INJECTION, SOLUTION INFILTRATION; PERINEURAL PRN
Status: DISCONTINUED | OUTPATIENT
Start: 2023-10-19 | End: 2023-10-19

## 2023-10-19 RX ORDER — ONDANSETRON 2 MG/ML
4 INJECTION INTRAMUSCULAR; INTRAVENOUS EVERY 6 HOURS PRN
Status: CANCELLED | OUTPATIENT
Start: 2023-10-19

## 2023-10-19 RX ORDER — PROPOFOL 10 MG/ML
INJECTION, EMULSION INTRAVENOUS CONTINUOUS PRN
Status: DISCONTINUED | OUTPATIENT
Start: 2023-10-19 | End: 2023-10-19

## 2023-10-19 RX ORDER — NALOXONE HYDROCHLORIDE 0.4 MG/ML
0.2 INJECTION, SOLUTION INTRAMUSCULAR; INTRAVENOUS; SUBCUTANEOUS
Status: CANCELLED | OUTPATIENT
Start: 2023-10-19

## 2023-10-19 RX ORDER — LIDOCAINE 40 MG/G
CREAM TOPICAL
Status: DISCONTINUED | OUTPATIENT
Start: 2023-10-19 | End: 2023-10-19 | Stop reason: HOSPADM

## 2023-10-19 RX ORDER — ONDANSETRON 4 MG/1
4 TABLET, ORALLY DISINTEGRATING ORAL EVERY 30 MIN PRN
Status: DISCONTINUED | OUTPATIENT
Start: 2023-10-19 | End: 2023-10-19 | Stop reason: HOSPADM

## 2023-10-19 RX ORDER — FLUMAZENIL 0.1 MG/ML
0.2 INJECTION, SOLUTION INTRAVENOUS
Status: CANCELLED | OUTPATIENT
Start: 2023-10-19 | End: 2023-10-19

## 2023-10-19 RX ADMIN — LIDOCAINE HYDROCHLORIDE 80 MG: 20 INJECTION, SOLUTION INFILTRATION; PERINEURAL at 08:39

## 2023-10-19 RX ADMIN — PROPOFOL 40 MG: 10 INJECTION, EMULSION INTRAVENOUS at 08:46

## 2023-10-19 RX ADMIN — SODIUM CHLORIDE, POTASSIUM CHLORIDE, SODIUM LACTATE AND CALCIUM CHLORIDE: 600; 310; 30; 20 INJECTION, SOLUTION INTRAVENOUS at 08:39

## 2023-10-19 RX ADMIN — PROPOFOL 150 MCG/KG/MIN: 10 INJECTION, EMULSION INTRAVENOUS at 08:41

## 2023-10-19 RX ADMIN — TOPICAL ANESTHETIC 1 SPRAY: 200 SPRAY DENTAL; PERIODONTAL at 08:39

## 2023-10-19 RX ADMIN — PROPOFOL 30 MG: 10 INJECTION, EMULSION INTRAVENOUS at 09:03

## 2023-10-19 ASSESSMENT — ACTIVITIES OF DAILY LIVING (ADL)
ADLS_ACUITY_SCORE: 35
ADLS_ACUITY_SCORE: 33

## 2023-10-19 NOTE — ANESTHESIA CARE TRANSFER NOTE
Patient: Lenroe DIOR Corless    Procedure: Procedure(s):  Esophagoscopy, gastroscopy, duodenoscopy (EGD), combined       Diagnosis: Gastroesophageal reflux disease without esophagitis [K21.9]  Diagnosis Additional Information: No value filed.    Anesthesia Type:   MAC     Note:    Oropharynx: oropharynx clear of all foreign objects and spontaneously breathing  Level of Consciousness: awake  Oxygen Supplementation: room air    Independent Airway: airway patency satisfactory and stable  Dentition: dentition unchanged  Vital Signs Stable: post-procedure vital signs reviewed and stable  Report to RN Given: handoff report given  Patient transferred to: Phase II    Handoff Report: Identifed the Patient, Identified the Reponsible Provider, Reviewed the pertinent medical history, Discussed the surgical course, Reviewed Intra-OP anesthesia mangement and issues during anesthesia, Set expectations for post-procedure period and Allowed opportunity for questions and acknowledgement of understanding      Vitals:  Vitals Value Taken Time   BP 97/67 10/19/23 0914   Temp     Pulse 66 10/19/23 0914   Resp     SpO2 97 10/19/23 0914       Electronically Signed By: ANIVAL Taylor CRNA  October 19, 2023  9:13 AM

## 2023-10-19 NOTE — BRIEF OP NOTE
[facility]    Brief Operative Note    Pre-operative diagnosis: Gastroesophageal reflux disease without esophagitis [K21.9]  Post-operative diagnosis Same as pre-operative diagnosis    Procedure: Esophagoscopy, gastroscopy, duodenoscopy (EGD), combined, N/A - Esophagus    Surgeon: Surgeon(s) and Role:     * Cira Chiu MD - Primary  Anesthesia: MAC   Estimated Blood Loss: None    Drains: None  Specimens: * No specimens in log *  Findings:   None.  Complications: None.  Implants: * No implants in log *    Findings: 1cm hiatal hernia, presbyesophagus, and diminutive fundus polyp with appearance of fundic gland polyp - not biopsied given benign appearance    Recommendations:  - would consider G-POEM for gastroparesis treatment, follow up with GI provider

## 2023-10-19 NOTE — OR NURSING
EGD with no interventions performed under MAC, patient tolerated well. Transferred to  and report given to recovery RN.

## 2023-10-19 NOTE — ANESTHESIA POSTPROCEDURE EVALUATION
Patient: Lenore Martinez    Procedure: Procedure(s):  Esophagoscopy, gastroscopy, duodenoscopy (EGD), combined       Anesthesia Type:  MAC    Note:  Disposition: Outpatient   Postop Pain Control: Uneventful            Sign Out: Well controlled pain   PONV: No   Neuro/Psych: Uneventful            Sign Out: Acceptable/Baseline neuro status   Airway/Respiratory: Uneventful            Sign Out: Acceptable/Baseline resp. status   CV/Hemodynamics: Uneventful            Sign Out: Acceptable CV status; No obvious hypovolemia; No obvious fluid overload   Other NRE: NONE   DID A NON-ROUTINE EVENT OCCUR? No           Last vitals:  Vitals Value Taken Time   BP     Temp     Pulse     Resp     SpO2         Electronically Signed By: Deric Ruiz MD  October 19, 2023  9:37 AM

## 2023-10-19 NOTE — H&P
Gastroenterology Pre-op History and Physical    Lenore Martinez MRN# [id number]   Age: 63 year old YOB: 1960      Date of Surgery: 10/19/23  Location [facility]      Date of Exam 10/19/2023 Facility Same Day       Primary care provider: Sudhakar Ledezma         Chief Complaint and/or Reason for Procedure:   63F with long-standing GERD, intermittent PPI compliance for EGD for Reyes's screening.           Past Medical and Surgical History:     Past Medical History:   Diagnosis Date    Anxiety     Bipolar disease, chronic (H)     Diabetic neuropathy (H) 06/03/2021    Hyperlipidemia LDL goal <70     Hypovitaminosis D 05/12/2014    Iron deficiency     Migraines     Neuropathy     PTSD (post-traumatic stress disorder) 04/16/2013    Diagnosed 11/2005     Past Surgical History:   Procedure Laterality Date    COLONOSCOPY N/A 11/4/2022    Procedure: COLONOSCOPY, WITH POLYPECTOMY With Clips;  Surgeon: Darien Norris MD;  Location: [facility] OR    HC REMOVAL OF OVARIAN CYST(S)  1987    HC TOOTH EXTRACTION W/FORCEP  1987    HYSTERECTOMY, PAP NO LONGER INDICATED  10/1/2009    has ovaries    MYRINGOTOMY      as  a child            Medications (include herbals and vitamins):        Facility-Administered Medications Prior to Admission   Medication Dose Route Frequency Provider Last Rate Last Admin    1 mL ropivacaine (NAROPIN) injection 5 mg/mL  1 mL   Zhang Dempsey MD   1 mL at 09/28/23 1110    betamethasone acet & sod phos (CELESTONE) injection 6 mg  6 mg   Zhang Dempsey MD   6 mg at 09/28/23 1110    betamethasone acet & sod phos (CELESTONE) injection 6 mg  6 mg   Zhang Dempsey MD   6 mg at 07/06/23 0934    lidocaine 1 % injection 0.5 mL  0.5 mL   Zhang Dempsey MD   0.5 mL at 07/06/23 0934     Medications Prior to Admission   Medication Sig Dispense Refill Last Dose    Aspirin-Acetaminophen-Caffeine (EXCEDRIN MIGRAINE PO) Take  by mouth as needed.   Past Week     citalopram (CELEXA) 20 MG tablet Take 20 mg by mouth daily   10/18/2023    citalopram (CELEXA) 40 MG tablet Take 40 mg by mouth daily   10/18/2023    Digestive Enzymes (DIGESTIVE ENZYME PO) [facility] [employer] digestive enzymes   10/19/2023    gabapentin (NEURONTIN) 600 MG tablet Take 2 tablets (1200 mg) by mouth 3 times daily 180 tablet 11 10/18/2023    lamoTRIgine (LAMICTAL) 100 MG tablet Take 100 mg by mouth 3 times daily.   10/19/2023    Specialty Vitamins Products (BRAIN PO) [employer] dynamic brain supplement   10/18/2023    Vitamin D, Cholecalciferol, 1000 units TABS Take 1,000 mg by mouth daily   10/18/2023             Allergies:      Allergies   Allergen Reactions    Compazine      Anxiety                 Physical Exam:   All vitals have been reviewed  Patient Vitals for the past 8 hrs:   BP Temp Temp src Pulse Resp SpO2   10/19/23 0742 96/59 97.8  F (36.6  C) Oral 68 16 97 %     No intake/output data recorded.  Airway assessment:   Patient is able to open mouth wide  Patient is able to stick out tongue  Mallampatti classification: Class I (visualization of the soft palate, fauces, uvula, anterior and posterior pillars)}      ENT:   Normocephalic, without obvious abnormality, atraumatic, sinuses nontender on palpation, external ears without lesions, oral pharynx with moist mucous membranes, tonsils without erythema or exudates, gums normal and good dentition.     Lungs:   No increased work of breathing, good air exchange, clear to auscultation bilaterally, no crackles or wheezing     Cardiovascular:   Normal apical impulse, regular rate and rhythm, normal S1 and S2, no S3 or S4, and no murmur noted                 Anesthetic risk and/or ASA classification: 3   63F with long-standing GERD, intermittent PPI compliance for EGD for Reyes's screening.    Cira Chiu MD

## 2023-10-23 NOTE — PROGRESS NOTES
NEUROLOGY FOLLOW UP VISIT  NOTE       [facility] NEUROLOGY [place]  [address] [place] [address]  Tel: [phone]  Fax: [phone]  [email or web address]     Lenore Martinez,  1960, MRN [id number]  PCP: Sudhakar Ledezma  Date: 10/25/2023      ASSESSMENT & PLAN     Visit Diagnosis  Diabetic polyneuropathy associated with type 2 diabetes mellitus (H)     Diabetic polyneuropathy  63-year-old female with bipolar disorder, PTSD who returns for follow-up for diabetic polyneuropathy.  She has cut down on her carbohydrate intake and has lost weight and her hemoglobin A1c for the last 1 year has been within normal range.  She has noticed improvement in her neuropathic symptoms.  I have recommended:    1.  Continue gabapentin 1200 mg 3 times daily prescriptions refilled  2.  Check gabapentin level and hepatic profile  3.  Follow-up in 1 year    Thank you again for this referral, please feel free to contact me if you have any questions.    Kedar Conn MD  [facility]  (Formerly, [facility]     HISTORY OF PRESENT ILLNESS     Patient is a 63-year-old female with bipolar disorder, PTSD last seen on 10/25/2022 for diabetic polyneuropathy who returns for follow-up.  During her previous visit she had an EMG that confirmed length-dependent mixed sensorimotor polyneuropathy.  Lab work for common causes of neuropathy was normal and her most recent hemoglobin A1c checked on 5/10/2023 was 5.6.  He was continued on gabapentin 1200 mg 3 times daily and since her last visit reports improvement in her paresthesias.  She has cut down on her carbohydrates, has lost weight and for more than a year her hemoglobin A1c is in the normal range and therefore she is Off any oral hypoglycemic.    Patient also has history of migraine headache usually preceded by an aura that are easily aborted by Excedrin.  For her bipolar disorder and PTSD she takes  lamotrigine and Celexa.     PROBLEM LIST   Patient Active Problem List   Diagnosis Code    PTSD (post-traumatic stress disorder) F43.10    Hypovitaminosis D E55.9    Migraine with aura  G43.109    Bipolar 2 disorder (H) F31.81    Diabetic neuropathy (H) E11.40    Hyperlipidemia LDL goal <70 E78.5         PAST MEDICAL & SURGICAL HISTORY     Past Medical History:   Patient  has a past medical history of Anxiety, Bipolar disease, chronic (H), Diabetic neuropathy (H) (06/03/2021), Hyperlipidemia LDL goal <70, Hypovitaminosis D (05/12/2014), Iron deficiency, Migraines, Neuropathy, and PTSD (post-traumatic stress disorder) (04/16/2013).    Surgical History:  She  has a past surgical history that includes hysterectomy, pap no longer indicated (10/1/2009); REMOVAL OF OVARIAN CYST(S) (1987); TOOTH EXTRACTION W/FORCEP (1987); Myringotomy; Colonoscopy (N/A, 11/4/2022); and Esophagoscopy, gastroscopy, duodenoscopy (EGD), combined (N/A, 10/19/2023).     SOCIAL HISTORY     Reviewed, and she  reports that she has never smoked. She has never used smokeless tobacco. She reports current alcohol use. She reports that she does not use drugs.     FAMILY HISTORY     Reviewed, and family history includes Bipolar Disorder in her son; Cerebrovascular Disease in her maternal grandfather; Diabetes in her brother, paternal grandmother, and son; Hyperlipidemia in her mother; Mental Illness in her brother, brother, mother, paternal grandmother, sister, and son; Substance Abuse in her brother and brother; Thyroid Disease in her son.     ALLERGIES     Allergies   Allergen Reactions    Compazine      Anxiety           REVIEW OF SYSTEMS     A 12 point review of system was performed and was negative except as outlined in the history of present illness.     HOME MEDICATIONS     Current Outpatient Rx   Medication Sig Dispense Refill    Aspirin-Acetaminophen-Caffeine (EXCEDRIN MIGRAINE PO) Take  by mouth as needed.      citalopram (CELEXA) 20 MG tablet  "Take 20 mg by mouth 3 times daily      Digestive Enzymes (DIGESTIVE ENZYME PO) [employer] digestive enzymes      gabapentin (NEURONTIN) 600 MG tablet Take 2 tablets (1200 mg) by mouth 3 times daily 180 tablet 11    lamoTRIgine (LAMICTAL) 100 MG tablet Take 100 mg by mouth 3 times daily.      Specialty Vitamins Products (BRAIN PO) [employer] dynamic brain supplement      Vitamin D, Cholecalciferol, 1000 units TABS Take 1,000 mg by mouth daily           PHYSICAL EXAM     Vital signs  BP 99/64 (BP Location: Right arm, Patient Position: Sitting)   Pulse 74   Ht 1.651 m (5' 5\")   Wt 67.6 kg (149 lb)   BMI 24.79 kg/m      Weight:   149 lbs 0 oz    Patient is alert and oriented x4 in no acute distress. Vital signs were reviewed and are documented in electronic medical record. Neck was supple, no carotid bruits, thyromegaly, JVD, or lymphadenopathy was noted.   NEUROLOGY EXAM:   Patient s speech was normal with no aphasia or dysarthria. Mentation, and affect were also normal.    Funduscopic exam was normal, with normal cup to disc ratio. Cranial nerves II -XII were intact.    Patient had normal mass, tone and motor strength was 5/5 in all extremities without pronator drift.    Sensation was decreased to light touch pinprick below knees   Reflexes were 1+ upper extremity absent in the lower   No dysmetria noted on FNF or HKS. Romberg was negative.   Gait testing was normal.      PERTINENT DIAGNOSTIC STUDIES     Following studies were reviewed:     MRI BRAIN 7/9/2019  1.  No evidence of acute intracranial hemorrhage, mass effect, or infarction.  2.  Mild brain parenchymal volume loss.     EMG 10/26/2021  This is a borderline abnormal EEG that suggests length dependent mixed sensorimotor polyneuropathy.  This likely is due to patient's known prediabetes, clinical correlation is recommended.     EMG 8/21/2018  This is a normal study.  There is no electrophysiological evidence of a diffuse neuropathy, myopathy, " or radiculpathy to affect the tested extremity muscles.      EMG 2/8/2011  This EMG nerve conduction study showed borderline reduced amplitude for the sural potentials, compatible with a very mild electrophysiological sensory neuropathy of the lower extremities.  Clinical correlation is recommended     PERTINENT LABS  Following labs were reviewed:  Admission on 10/19/2023, Discharged on 10/19/2023   Component Date Value Ref Range Status    Upper GI Endoscopy 10/19/2023    Final                    Value:[address] [place] [address] [phone]     Endoscopy Department  _______________________________________________________________________________  Patient Name: Lenore Corless           Procedure Date: 10/19/2023 8:28 AM  MRN: [id number]                       Account Number: [id number]  YOB: 1960              Admit Type: Outpatient  Age: 63                               Room[facility]                      Gender: Female                        Note Status: Finalized  Attending MD: ROGELIO MCLEOD MD,  Pause for the Cause: done  Total Sedation Time:                    _______________________________________________________________________________     Procedure:             Upper GI endoscopy  Indications:           Regurgitation; Established gastroparesis with 30%                          remaining at 4 hours on gastric emptying scan  Providers:             ROGELIO MCLEOD MD, Mi Munoz RN  Referring MD:                                    CADY ROCHA  Medicines:             Monitored Anesthesia Care  Complications:         No immediate complications. Estimated blood loss: None.  _______________________________________________________________________________  Procedure:             Pre-Anesthesia Assessment:                         - Prior to the procedure, a History and Physical was                          performed, and patient  medications, allergies and                          sensitivities were reviewed. The patient's tolerance                          of previous anesthesia was reviewed.                         After obtaining informed consent, the endoscope was                          passed under direct vision. Throughout the procedure,                          the patient's blood pressure, pulse, and oxygen                          saturations were monitored continuously. The Endoscope                          was introduced through the mouth, and advanced to the                          second part                           of duodenum. The upper GI endoscopy was                          accomplished without difficulty. The patient tolerated                          the procedure well.                                                                                   Findings:       The examined esophagus was mildly tortuous.       The exam of the esophagus was otherwise normal.       A 1 cm hiatal hernia was found. The proximal extent of the gastric folds        (end of tubular esophagus) was 37 cm from the incisors. The hiatal        narrowing was 38 cm from the incisors. The Z-line was 37 cm from the        incisors.       A single 2 mm sessile polyp was found in the gastric fundus. Polypectomy        deferred given bengin appearance of fundic gland polyp.       The exam of the stomach was otherwise normal.       The examined duodenum was normal.                                                                                   Impression:            - Tortuous esophagus.                         - 1 cm h                          iatal hernia.                         - A single gastric polyp.                         - Normal examined duodenum.                         - No specimens collected.  Recommendation:        - Discharge patient to home (with escort).                         - Would continue gastroparesis diet - follow up  in                          gastro clinic and consider G-POEM procedure if                          symptoms persist.                         - The findings and recommendations were discussed with                          the patient and their family.                                                                                     Cira Mcleod MD  ________________________  CIRA MCLEOD MD  10/19/2023 9:22:54 AM  I was physically present for the entire viewing portion of the exam.  __________________________  Signature of teaching physician  Manpreet/Dorie MCLEOD MD  Number of Addenda: 0    Note Initiated On: 10/19/2023 8:28 AM  Scope In:  Scope Out:           Total time spent for face to face visit, reviewing labs/imaging studies, counseling and coordination of care was: 30 Minutes spent on the date of the encounter doing chart review, review of outside records, review of test results, interpretation of tests, patient visit, and documentation     This note was dictated using voice recognition software.  Any grammatical or context distortions are unintentional and inherent to the software.    Orders Placed This Encounter   Procedures    Gabapentin Level    Hepatic function panel      New Prescriptions    No medications on file     Modified Medications    Modified Medication Previous Medication    GABAPENTIN (NEURONTIN) 600 MG TABLET gabapentin (NEURONTIN) 600 MG tablet       Take 2 tablets (1200 mg) by mouth 3 times daily    Take 2 tablets (1200 mg) by mouth 3 times daily

## 2023-10-25 ENCOUNTER — OFFICE VISIT (OUTPATIENT)
Dept: NEUROLOGY | Facility: CLINIC | Age: 63
End: 2023-10-25
Payer: COMMERCIAL

## 2023-10-25 VITALS
HEART RATE: 74 BPM | DIASTOLIC BLOOD PRESSURE: 64 MMHG | WEIGHT: 149 LBS | HEIGHT: 65 IN | BODY MASS INDEX: 24.83 KG/M2 | SYSTOLIC BLOOD PRESSURE: 99 MMHG

## 2023-10-25 DIAGNOSIS — E11.42 DIABETIC POLYNEUROPATHY ASSOCIATED WITH TYPE 2 DIABETES MELLITUS (H): Primary | ICD-10-CM

## 2023-10-25 PROCEDURE — 99214 OFFICE O/P EST MOD 30 MIN: CPT | Performed by: PSYCHIATRY & NEUROLOGY

## 2023-10-25 RX ORDER — GABAPENTIN 600 MG/1
TABLET ORAL
Qty: 180 TABLET | Refills: 11 | Status: SHIPPED | OUTPATIENT
Start: 2023-10-25 | End: 2024-09-25

## 2023-10-25 NOTE — LETTER
10/25/2023         RE: Lenore Martinez  [address]  [place] [address]        Dear Colleague,    Thank you for referring your patient, Lenore Martinez, to the [facility] [place]. Please see a copy of my visit note below.    NEUROLOGY FOLLOW UP VISIT  NOTE       [facility] [place]  [address] [place] [address]  Tel: [phone]  Fax: [phone]  [email or web address]     Lenore Martinez,  1960, MRN [id number]  PCP: Sudhakar Ledezma  Date: 10/25/2023      ASSESSMENT & PLAN     Visit Diagnosis  Diabetic polyneuropathy associated with type 2 diabetes mellitus (H)     Diabetic polyneuropathy  63-year-old female with bipolar disorder, PTSD who returns for follow-up for diabetic polyneuropathy.  She has cut down on her carbohydrate intake and has lost weight and her hemoglobin A1c for the last 1 year has been within normal range.  She has noticed improvement in her neuropathic symptoms.  I have recommended:    1.  Continue gabapentin 1200 mg 3 times daily prescriptions refilled  2.  Check gabapentin level and hepatic profile  3.  Follow-up in 1 year    Thank you again for this referral, please feel free to contact me if you have any questions.    Kedar Conn MD  [facility] [place]  (Formerly, [facility]     HISTORY OF PRESENT ILLNESS     Patient is a 63-year-old female with bipolar disorder, PTSD last seen on 10/25/2022 for diabetic polyneuropathy who returns for follow-up.  During her previous visit she had an EMG that confirmed length-dependent mixed sensorimotor polyneuropathy.  Lab work for common causes of neuropathy was normal and her most recent hemoglobin A1c checked on 5/10/2023 was 5.6.  He was continued on gabapentin 1200 mg 3 times daily and since her last visit reports improvement in her paresthesias.  She has cut down on her carbohydrates, has lost weight and for more than a  year her hemoglobin A1c is in the normal range and therefore she is Off any oral hypoglycemic.    Patient also has history of migraine headache usually preceded by an aura that are easily aborted by Excedrin.  For her bipolar disorder and PTSD she takes lamotrigine and Celexa.     PROBLEM LIST   Patient Active Problem List   Diagnosis Code     PTSD (post-traumatic stress disorder) F43.10     Hypovitaminosis D E55.9     Migraine with aura  G43.109     Bipolar 2 disorder (H) F31.81     Diabetic neuropathy (H) E11.40     Hyperlipidemia LDL goal <70 E78.5         PAST MEDICAL & SURGICAL HISTORY     Past Medical History:   Patient  has a past medical history of Anxiety, Bipolar disease, chronic (H), Diabetic neuropathy (H) (06/03/2021), Hyperlipidemia LDL goal <70, Hypovitaminosis D (05/12/2014), Iron deficiency, Migraines, Neuropathy, and PTSD (post-traumatic stress disorder) (04/16/2013).    Surgical History:  She  has a past surgical history that includes hysterectomy, pap no longer indicated (10/1/2009); REMOVAL OF OVARIAN CYST(S) (1987); TOOTH EXTRACTION W/FORCEP (1987); Myringotomy; Colonoscopy (N/A, 11/4/2022); and Esophagoscopy, gastroscopy, duodenoscopy (EGD), combined (N/A, 10/19/2023).     SOCIAL HISTORY     Reviewed, and she  reports that she has never smoked. She has never used smokeless tobacco. She reports current alcohol use. She reports that she does not use drugs.     FAMILY HISTORY     Reviewed, and family history includes Bipolar Disorder in her son; Cerebrovascular Disease in her maternal grandfather; Diabetes in her brother, paternal grandmother, and son; Hyperlipidemia in her mother; Mental Illness in her brother, brother, mother, paternal grandmother, sister, and son; Substance Abuse in her brother and brother; Thyroid Disease in her son.     ALLERGIES     Allergies   Allergen Reactions     Compazine      Anxiety           REVIEW OF SYSTEMS     A 12 point review of system was performed and was  "negative except as outlined in the history of present illness.     HOME MEDICATIONS     Current Outpatient Rx   Medication Sig Dispense Refill     Aspirin-Acetaminophen-Caffeine (EXCEDRIN MIGRAINE PO) Take  by mouth as needed.       citalopram (CELEXA) 20 MG tablet Take 20 mg by mouth 3 times daily       Digestive Enzymes (DIGESTIVE ENZYME PO) [employer] digestive enzymes       gabapentin (NEURONTIN) 600 MG tablet Take 2 tablets (1200 mg) by mouth 3 times daily 180 tablet 11     lamoTRIgine (LAMICTAL) 100 MG tablet Take 100 mg by mouth 3 times daily.       Specialty Vitamins Products (BRAIN PO) [employer] dynamic brain supplement       Vitamin D, Cholecalciferol, 1000 units TABS Take 1,000 mg by mouth daily           PHYSICAL EXAM     Vital signs  BP 99/64 (BP Location: Right arm, Patient Position: Sitting)   Pulse 74   Ht 1.651 m (5' 5\")   Wt 67.6 kg (149 lb)   BMI 24.79 kg/m      Weight:   149 lbs 0 oz    Patient is alert and oriented x4 in no acute distress. Vital signs were reviewed and are documented in electronic medical record. Neck was supple, no carotid bruits, thyromegaly, JVD, or lymphadenopathy was noted.   NEUROLOGY EXAM:    Patient s speech was normal with no aphasia or dysarthria. Mentation, and affect were also normal.     Funduscopic exam was normal, with normal cup to disc ratio. Cranial nerves II -XII were intact.     Patient had normal mass, tone and motor strength was 5/5 in all extremities without pronator drift.     Sensation was decreased to light touch pinprick below knees    Reflexes were 1+ upper extremity absent in the lower    No dysmetria noted on FNF or HKS. Romberg was negative.    Gait testing was normal.      PERTINENT DIAGNOSTIC STUDIES     Following studies were reviewed:     MRI BRAIN 7/9/2019  1.  No evidence of acute intracranial hemorrhage, mass effect, or infarction.  2.  Mild brain parenchymal volume loss.     EMG 10/26/2021  This is a borderline abnormal " EEG that suggests length dependent mixed sensorimotor polyneuropathy.  This likely is due to patient's known prediabetes, clinical correlation is recommended.     EMG 8/21/2018  This is a normal study.  There is no electrophysiological evidence of a diffuse neuropathy, myopathy, or radiculpathy to affect the tested extremity muscles.      EMG 2/8/2011  This EMG nerve conduction study showed borderline reduced amplitude for the sural potentials, compatible with a very mild electrophysiological sensory neuropathy of the lower extremities.  Clinical correlation is recommended     PERTINENT LABS  Following labs were reviewed:  Admission on 10/19/2023, Discharged on 10/19/2023   Component Date Value Ref Range Status     Upper GI Endoscopy 10/19/2023    Final                    Value:[facility]  [address] [place] [address] [phone]     Endoscopy Department  _______________________________________________________________________________  Patient Name: Lenore Corless           Procedure Date: 10/19/2023 8:28 AM  MRN: [id number]                       Account Number: [id number]  YOB: 1960              Admit Type: Outpatient  Age: 63                               Room: [facility]                      Gender: Female                        Note Status: Finalized  Attending MD: ROGELIO MCLEOD MD,  Pause for the Cause: done  Total Sedation Time:                    _______________________________________________________________________________     Procedure:             Upper GI endoscopy  Indications:           Regurgitation; Established gastroparesis with 30%                          remaining at 4 hours on gastric emptying scan  Providers:             ROGELIO MCLEOD MD, Mi Munoz RN  Referring MD:                                    CADY ROCHA  Medicines:             Monitored Anesthesia Care  Complications:         No immediate complications. Estimated  blood loss: None.  _______________________________________________________________________________  Procedure:             Pre-Anesthesia Assessment:                         - Prior to the procedure, a History and Physical was                          performed, and patient medications, allergies and                          sensitivities were reviewed. The patient's tolerance                          of previous anesthesia was reviewed.                         After obtaining informed consent, the endoscope was                          passed under direct vision. Throughout the procedure,                          the patient's blood pressure, pulse, and oxygen                          saturations were monitored continuously. The Endoscope                          was introduced through the mouth, and advanced to the                          second part                           of duodenum. The upper GI endoscopy was                          accomplished without difficulty. The patient tolerated                          the procedure well.                                                                                   Findings:       The examined esophagus was mildly tortuous.       The exam of the esophagus was otherwise normal.       A 1 cm hiatal hernia was found. The proximal extent of the gastric folds        (end of tubular esophagus) was 37 cm from the incisors. The hiatal        narrowing was 38 cm from the incisors. The Z-line was 37 cm from the        incisors.       A single 2 mm sessile polyp was found in the gastric fundus. Polypectomy        deferred given bengin appearance of fundic gland polyp.       The exam of the stomach was otherwise normal.       The examined duodenum was normal.                                                                                   Impression:            - Tortuous esophagus.                         - 1 cm h                          iatal hernia.                          - A single gastric polyp.                         - Normal examined duodenum.                         - No specimens collected.  Recommendation:        - Discharge patient to home (with escort).                         - Would continue gastroparesis diet - follow up in                          gastro clinic and consider G-POEM procedure if                          symptoms persist.                         - The findings and recommendations were discussed with                          the patient and their family.                                                                                     Cira Mcleod MD  ________________________  CIRA MCLEOD MD  10/19/2023 9:22:54 AM  I was physically present for the entire viewing portion of the exam.  __________________________  Signature of teaching physician  Manpreet/Dorie MCLEOD MD  Number of Addenda: 0    Note Initiated On: 10/19/2023 8:28 AM  Scope In:  Scope Out:           Total time spent for face to face visit, reviewing labs/imaging studies, counseling and coordination of care was: 30 Minutes spent on the date of the encounter doing chart review, review of outside records, review of test results, interpretation of tests, patient visit, and documentation     This note was dictated using voice recognition software.  Any grammatical or context distortions are unintentional and inherent to the software.    Orders Placed This Encounter   Procedures     Gabapentin Level     Hepatic function panel      New Prescriptions    No medications on file     Modified Medications    Modified Medication Previous Medication    GABAPENTIN (NEURONTIN) 600 MG TABLET gabapentin (NEURONTIN) 600 MG tablet       Take 2 tablets (1200 mg) by mouth 3 times daily    Take 2 tablets (1200 mg) by mouth 3 times daily                 Again, thank you for allowing me to participate in the care of your patient.        Sincerely,        Kedar Conn MD

## 2023-10-25 NOTE — NURSING NOTE
Chief Complaint   Patient presents with    NEUROPATHY     Annual follow up- patient reports pain is under control     Jud Collier CMA on 10/25/2023 at 12:33 PM  [facility]

## 2023-10-29 ENCOUNTER — HEALTH MAINTENANCE LETTER (OUTPATIENT)
Age: 63
End: 2023-10-29

## 2023-11-03 ENCOUNTER — E-VISIT (OUTPATIENT)
Dept: FAMILY MEDICINE | Facility: CLINIC | Age: 63
End: 2023-11-03
Payer: COMMERCIAL

## 2023-11-03 DIAGNOSIS — J01.90 ACUTE NON-RECURRENT SINUSITIS, UNSPECIFIED LOCATION: Primary | ICD-10-CM

## 2023-11-03 PROCEDURE — 99207 PR NO BILLABLE SERVICE THIS VISIT: CPT | Performed by: NURSE PRACTITIONER

## 2023-11-03 RX ORDER — PSEUDOEPHEDRINE HCL 120 MG/1
120 TABLET, FILM COATED, EXTENDED RELEASE ORAL EVERY 12 HOURS
Qty: 15 TABLET | Refills: 0 | Status: SHIPPED | OUTPATIENT
Start: 2023-11-03 | End: 2024-01-11

## 2023-11-03 RX ORDER — GUAIFENESIN 1200 MG/1
1200 TABLET, EXTENDED RELEASE ORAL 2 TIMES DAILY
Qty: 60 TABLET | Refills: 0 | Status: SHIPPED | OUTPATIENT
Start: 2023-11-03 | End: 2024-02-07

## 2023-11-03 RX ORDER — FLUTICASONE PROPIONATE 50 MCG
1 SPRAY, SUSPENSION (ML) NASAL DAILY
Qty: 16 G | Refills: 0 | Status: SHIPPED | OUTPATIENT
Start: 2023-11-03 | End: 2024-01-11

## 2023-11-03 NOTE — PATIENT INSTRUCTIONS
Thank you for choosing us for your care. I have placed an order for a prescription so that you can start treatment. View your full visit summary for details by clicking on the link below. Your pharmacist will able to address any questions you may have about the medication.     If you're not feeling better within 5-7 days, please schedule an appointment.  You can schedule an appointment right here in [facility], or call [phone]  If the visit is for the same symptoms as your eVisit, we'll refund the cost of your eVisit if seen within seven days.    It appears like you have a sinus infection.  Recommend treatment with Flonase nasal spray (to decrease mucosal inflammation in the sinuses and nose that allows improved sinus drainage) and Guaifenesin (to thin secretions to help promote mucus drainage and clearance from the sinuses/nose).    The vast majority of sinus infections are due to viruses rather than bacterial, and due to increasing concerns regarding antibiotic resistance recommend again antibiotic treatment unless symptoms are persisting/not improving by around ten days of illness.    You may want to try a nasal lavage (also known as nasal irrigation).  You can find over-the-counter products, such as Neti-Pot, at retail locations or make your own at home.  Instructions for homemade nasal lavage and more information on the process are available online at http://www.aafp.org/afp/2009/1115/p1121.html.        Acute Sinusitis: Care Instructions  Overview     Acute sinusitis is an inflammation of the mucous membranes inside the nose and sinuses. Sinuses are the hollow spaces in your skull around the eyes and nose. Acute sinusitis often follows a cold. Acute sinusitis causes thick, discolored mucus that drains from the nose or down the back of the throat. It also can cause pain and pressure in your head and face along with a stuffy or blocked nose.  In most cases, sinusitis gets better on its own in 1 to 2 weeks. But  some mild symptoms may last for several weeks. Sometimes antibiotics are needed if there is a bacterial infection.  Follow-up care is a key part of your treatment and safety. Be sure to make and go to all appointments, and call your doctor if you are having problems. It's also a good idea to know your test results and keep a list of the medicines you take.  How can you care for yourself at home?  Use saline (saltwater) nasal washes. This can help keep your nasal passages open and wash out mucus and allergens.  You can buy saline nose washes at a grocery store or [employer]. Follow the instructions on the package.  You can make your own at home. Add 1 teaspoon of non-iodized salt and 1 teaspoon of baking soda to 2 cups of distilled or boiled and cooled water. Fill a squeeze bottle or a nasal cleansing pot (such as a neti pot) with the nasal wash. Then put the tip into your nostril, and lean over the sink. With your mouth open, gently squirt the liquid. Repeat on the other side.  Try a decongestant nasal spray like oxymetazoline (Afrin). Do not use it for more than 3 days in a row. Using it for more than 3 days can make your congestion worse.  If needed, take an over-the-counter pain medicine, such as acetaminophen (Tylenol), ibuprofen (Advil, Motrin), or naproxen (Aleve). Read and follow all instructions on the label.  If the doctor prescribed antibiotics, take them as directed. Do not stop taking them just because you feel better. You need to take the full course of antibiotics.  Be careful when taking over-the-counter cold or flu medicines and Tylenol at the same time. Many of these medicines have acetaminophen, which is Tylenol. Read the labels to make sure that you are not taking more than the recommended dose. Too much acetaminophen (Tylenol) can be harmful.  Try a steroid nasal spray. It may help with your symptoms.  Breathe warm, moist air. You can use a steamy shower, a hot bath, or a sink filled with hot  "water. Avoid cold, dry air. Using a humidifier in your home may help. Follow the directions for cleaning the machine.  When should you call for help?   Call your doctor now or seek immediate medical care if:    You have new or worse swelling, redness, or pain in your face or around one or both of your eyes.     You have double vision or a change in your vision.     You have a high fever.     You have a severe headache and a stiff neck.     You have mental changes, such as feeling confused or much less alert.   Watch closely for changes in your health, and be sure to contact your doctor if:    You are not getting better as expected.   Where can you learn more?  Go to https://www.[employer].net/patiented  Enter I933 in the search box to learn more about \"Acute Sinusitis: Care Instructions.\"  Current as of: March 1, 2023               Content Version: 13.7    7892-0014 [email or web address].   Care instructions adapted under license by your healthcare professional. If you have questions about a medical condition or this instruction, always ask your healthcare professional. [email or web address] disclaims any warranty or liability for your use of this information.      Dear Lenore Martinez    After reviewing your responses, I've been able to diagnose you with Acute non-recurrent sinusitis, unspecified location.      Based on your responses and diagnosis, I have prescribed   Orders Placed This Encounter   Medications     guaiFENesin 1200 MG TB12     Sig: Take 1 tablet (1,200 mg) by mouth 2 times daily     Dispense:  60 tablet     Refill:  0     pseudoePHEDrine (SUDAFED) 120 MG 12 hr tablet     Sig: Take 1 tablet (120 mg) by mouth every 12 hours     Dispense:  15 tablet     Refill:  0      to treat your symptoms. I have sent this to your pharmacy.?     It is also important to stay well hydrated, get lots of rest and take over-the-counter decongestants,?tylenol?or ibuprofen if you?are able to?take those medications " per your primary care provider to help relieve discomfort.?     It is important that you take?all of?your prescribed medication even if your symptoms are improving after a few doses.? Taking?all of?your medicine helps prevent the symptoms from returning.?     If your symptoms worsen, you develop severe headache, vomiting, high fever (>102), or are not improving in 7 days, please contact your primary care provider for an appointment or visit any of our convenient Walk-in Care or Urgent Care Centers to be seen which can be found on our website?here.?     Thanks again for choosing?us?as your health care partner,?   ?  Aubree Laguna NP?

## 2023-11-04 ENCOUNTER — E-VISIT (OUTPATIENT)
Dept: FAMILY MEDICINE | Facility: CLINIC | Age: 63
End: 2023-11-04
Payer: COMMERCIAL

## 2023-11-04 DIAGNOSIS — R05.9 COUGH, UNSPECIFIED TYPE: Primary | ICD-10-CM

## 2023-11-04 PROCEDURE — 99207 PR NON-BILLABLE SERV PER CHARTING: CPT | Performed by: NURSE PRACTITIONER

## 2023-11-05 ENCOUNTER — OFFICE VISIT (OUTPATIENT)
Dept: URGENT CARE | Facility: URGENT CARE | Age: 63
End: 2023-11-05
Payer: COMMERCIAL

## 2023-11-05 VITALS
OXYGEN SATURATION: 96 % | HEART RATE: 77 BPM | BODY MASS INDEX: 24.63 KG/M2 | DIASTOLIC BLOOD PRESSURE: 75 MMHG | WEIGHT: 148 LBS | TEMPERATURE: 98.7 F | RESPIRATION RATE: 17 BRPM | SYSTOLIC BLOOD PRESSURE: 109 MMHG

## 2023-11-05 DIAGNOSIS — J02.9 SORETHROAT: ICD-10-CM

## 2023-11-05 DIAGNOSIS — J22 LOWER RESP. TRACT INFECTION: Primary | ICD-10-CM

## 2023-11-05 LAB
DEPRECATED S PYO AG THROAT QL EIA: NEGATIVE
GROUP A STREP BY PCR: NOT DETECTED

## 2023-11-05 PROCEDURE — 99214 OFFICE O/P EST MOD 30 MIN: CPT | Performed by: PHYSICIAN ASSISTANT

## 2023-11-05 PROCEDURE — 87651 STREP A DNA AMP PROBE: CPT | Performed by: PHYSICIAN ASSISTANT

## 2023-11-05 RX ORDER — BENZONATATE 200 MG/1
200 CAPSULE ORAL 3 TIMES DAILY PRN
Qty: 30 CAPSULE | Refills: 0 | Status: SHIPPED | OUTPATIENT
Start: 2023-11-05 | End: 2024-01-11

## 2023-11-05 ASSESSMENT — ENCOUNTER SYMPTOMS
SHORTNESS OF BREATH: 0
NECK STIFFNESS: 0
BACK PAIN: 0
NECK PAIN: 0
WEAKNESS: 0
FEVER: 0
PALPITATIONS: 0
EYES NEGATIVE: 1
DIARRHEA: 0
RHINORRHEA: 0
SORE THROAT: 1
ARTHRALGIAS: 0
WOUND: 0
ALLERGIC/IMMUNOLOGIC NEGATIVE: 1
JOINT SWELLING: 0
COUGH: 1
CHILLS: 0
HEADACHES: 0
CARDIOVASCULAR NEGATIVE: 1
NAUSEA: 0
DIZZINESS: 0
LIGHT-HEADEDNESS: 0
ENDOCRINE NEGATIVE: 1
MUSCULOSKELETAL NEGATIVE: 1
MYALGIAS: 0
VOMITING: 0

## 2023-11-05 NOTE — PROGRESS NOTES
Chief Complaint:     Chief Complaint   Patient presents with    Cough     Really bad usually non stop and its non productive, had an Evisit given some things not better can't even talk    congestion     Had a endoscopy and since Wednesday lost voice, allergies?       Results for orders placed or performed in visit on 11/05/23   Streptococcus A Rapid Screen w/Reflex to PCR - Clinic Collect     Status: Normal    Specimen: Throat; Swab   Result Value Ref Range    Group A Strep antigen Negative Negative       Medical Decision Making:    Vital signs reviewed by Tyrese Leal PA-C  /75   Pulse 77   Temp 98.7  F (37.1  C) (Tympanic)   Resp 17   Wt 67.1 kg (148 lb)   SpO2 96%   BMI 24.63 kg/m      Differential Diagnosis:  URI Adult/Peds:  Bronchitis-viral, Influenza, Pneumonia, Strep pharyngitis, Tonsilitis, Viral pharyngitis, Viral syndrome, and Viral upper respiratory illness        ASSESSMENT    1. Lower resp. tract infection    2. Sorethroat        PLAN    Patient is in no acute distress.    Temp is 98.7 in clinic today, lung sounds were clear, and O2 sats at 96% on RA.    With symptoms, Rx for Augmentin sent in. Patient will fill this if symptoms are not improving.    RST was negative.  We will call with PCR results only if positive.  Rest, Push fluids, vaporizer, elevation of head of bed.  Ibuprofen and or Tylenol for any fever or body aches.  Rx for Tessalon counter cough suppressant- PRN- as discussed.   If symptoms worsen, recheck immediately otherwise follow up with your PCP in 1 week if symptoms are not improving.  Worrisome symptoms discussed with instructions to go to the ED.  Patient verbalized understanding and agreed with this plan.    36 minutes was spent in the care of this patient including chart review, HPI, ROS, PE, review of plan, and placing of orders.      Labs:    Results for orders placed or performed in visit on 11/05/23   Streptococcus A Rapid Screen w/Reflex to PCR - Clinic  Collect     Status: Normal    Specimen: Throat; Swab   Result Value Ref Range    Group A Strep antigen Negative Negative        Vital signs reviewed by Tyrese Leal PA-C  /75   Pulse 77   Temp 98.7  F (37.1  C) (Tympanic)   Resp 17   Wt 67.1 kg (148 lb)   SpO2 96%   BMI 24.63 kg/m      Current Meds      Current Outpatient Medications:     amoxicillin-clavulanate (AUGMENTIN) 875-125 MG tablet, Take 1 tablet by mouth 2 times daily for 10 days, Disp: 20 tablet, Rfl: 0    Aspirin-Acetaminophen-Caffeine (EXCEDRIN MIGRAINE PO), Take  by mouth as needed., Disp: , Rfl:     benzonatate (TESSALON) 200 MG capsule, Take 1 capsule (200 mg) by mouth 3 times daily as needed for cough, Disp: 30 capsule, Rfl: 0    citalopram (CELEXA) 20 MG tablet, Take 20 mg by mouth 3 times daily, Disp: , Rfl:     Digestive Enzymes (DIGESTIVE ENZYME PO), [facility], Disp: , Rfl:     fluticasone (FLONASE) 50 MCG/ACT nasal spray, Spray 1 spray into both nostrils daily, Disp: 16 g, Rfl: 0    gabapentin (NEURONTIN) 600 MG tablet, Take 2 tablets (1200 mg) by mouth 3 times daily, Disp: 180 tablet, Rfl: 11    guaiFENesin 1200 MG TB12, Take 1 tablet (1,200 mg) by mouth 2 times daily, Disp: 60 tablet, Rfl: 0    lamoTRIgine (LAMICTAL) 100 MG tablet, Take 100 mg by mouth 3 times daily., Disp: , Rfl:     pseudoePHEDrine (SUDAFED) 120 MG 12 hr tablet, Take 1 tablet (120 mg) by mouth every 12 hours, Disp: 15 tablet, Rfl: 0    Specialty Vitamins Products (BRAIN PO), [employer] dynamic brain supplement, Disp: , Rfl:     Vitamin D, Cholecalciferol, 1000 units TABS, Take 1,000 mg by mouth daily, Disp: , Rfl:     Current Facility-Administered Medications:     1 mL ropivacaine (NAROPIN) injection 5 mg/mL, 1 mL, , , Zhang Dempsey MD, 1 mL at 09/28/23 1110    betamethasone acet & sod phos (CELESTONE) injection 6 mg, 6 mg, , , Zhang Dempsey MD, 6 mg at 09/28/23 1110    betamethasone acet & sod phos (CELESTONE)  injection 6 mg, 6 mg, , , Zhang Dempsey MD, 6 mg at 23 0934    lidocaine 1 % injection 0.5 mL, 0.5 mL, , , Zhang Dempsey MD, 0.5 mL at 23 0934      Respiratory History    occasional episodes of bronchitis and pneumonia      SUBJECTIVE    HPI: Lenore Martinez is an 63 year old female who presents with chest congestion, cough nonproductive, occasional, and sore throat.  Symptoms began 4 days ago and has unchanged.  There is no shortness of breath, wheezing, and chest pain.  Patient is eating and drinking well.  No fever, nausea, vomiting, or diarrhea.    Patient denies any recent travel or exposure to known COVID positive tested individual.      ROS:     Review of Systems   Constitutional:  Negative for chills and fever.   HENT:  Positive for congestion and sore throat. Negative for ear pain and rhinorrhea.    Eyes: Negative.    Respiratory:  Positive for cough. Negative for shortness of breath.    Cardiovascular: Negative.  Negative for chest pain and palpitations.   Gastrointestinal:  Negative for diarrhea, nausea and vomiting.   Endocrine: Negative.    Genitourinary: Negative.    Musculoskeletal: Negative.  Negative for arthralgias, back pain, joint swelling, myalgias, neck pain and neck stiffness.   Skin: Negative.  Negative for rash and wound.   Allergic/Immunologic: Negative.  Negative for immunocompromised state.   Neurological:  Negative for dizziness, weakness, light-headedness and headaches.         Family History   Family History   Problem Relation Age of Onset    Hyperlipidemia Mother             Mental Illness Mother     Mental Illness Sister         Bipolar    Diabetes Brother     Mental Illness Brother         Schizophrenic    Substance Abuse Brother     Mental Illness Brother         Schizophrenic Shot himself    Substance Abuse Brother          under drug inducement    Cerebrovascular Disease Maternal Grandfather     Diabetes Paternal Grandmother         Type2     Mental Illness Paternal Grandmother     Diabetes Son         Type1    Mental Illness Son         Bipolar    Thyroid Disease Son     Bipolar Disorder Son     Coronary Artery Disease No family hx of     Cancer No family hx of         Problem history  Patient Active Problem List   Diagnosis    PTSD (post-traumatic stress disorder)    Hypovitaminosis D    Migraine with aura     Bipolar 2 disorder (H)    Diabetic neuropathy (H)    Hyperlipidemia LDL goal <70    Major depression        Allergies  Allergies   Allergen Reactions    Compazine      Anxiety          Social History  Social History     Socioeconomic History    Marital status:      Spouse name: Arturo     Number of children: 2    Years of education: 14    Highest education level: Not on file   Occupational History    Occupation: home health aide      Employer: [employer]   Tobacco Use    Smoking status: Never    Smokeless tobacco: Never    Tobacco comments:     smoke free household.   Vaping Use    Vaping Use: Never used   Substance and Sexual Activity    Alcohol use: Yes     Comment: 1-2 a month    Drug use: No    Sexual activity: Not Currently     Partners: Male     Birth control/protection: Female Surgical   Other Topics Concern    Parent/sibling w/ CABG, MI or angioplasty before 65F 55M? Not Asked   Social History Narrative    Not on file     Social Determinants of Health     Financial Resource Strain: Not on file   Food Insecurity: Not on file   Transportation Needs: Not on file   Physical Activity: Not on file   Stress: Not on file   Social Connections: Not on file   Interpersonal Safety: Not on file   Housing Stability: Not on file        OBJECTIVE     Vital signs reviewed by Tyrese Leal PA-C  /75   Pulse 77   Temp 98.7  F (37.1  C) (Tympanic)   Resp 17   Wt 67.1 kg (148 lb)   SpO2 96%   BMI 24.63 kg/m       Physical Exam  Vitals and nursing note reviewed.   Constitutional:       General: She is not in acute distress.     Appearance:  She is well-developed. She is not ill-appearing, toxic-appearing or diaphoretic.   HENT:      Head: Normocephalic and atraumatic.      Right Ear: Hearing, tympanic membrane, ear canal and external ear normal. Tympanic membrane is not perforated, erythematous, retracted or bulging.      Left Ear: Hearing, tympanic membrane, ear canal and external ear normal. Tympanic membrane is not perforated, erythematous, retracted or bulging.      Nose: Congestion present. No mucosal edema or rhinorrhea.      Right Sinus: No maxillary sinus tenderness or frontal sinus tenderness.      Left Sinus: No maxillary sinus tenderness or frontal sinus tenderness.      Mouth/Throat:      Pharynx: Posterior oropharyngeal erythema present. No pharyngeal swelling, oropharyngeal exudate or uvula swelling.      Tonsils: No tonsillar exudate or tonsillar abscesses. 0 on the right. 0 on the left.   Eyes:      General:         Right eye: No discharge.         Left eye: No discharge.      Pupils: Pupils are equal, round, and reactive to light.   Cardiovascular:      Rate and Rhythm: Normal rate and regular rhythm.      Heart sounds: Normal heart sounds. No murmur heard.     No friction rub. No gallop.   Pulmonary:      Effort: Pulmonary effort is normal. No respiratory distress.      Breath sounds: Normal breath sounds. No decreased breath sounds, wheezing, rhonchi or rales.   Chest:      Chest wall: No tenderness.   Abdominal:      General: Bowel sounds are normal. There is no distension.      Palpations: Abdomen is soft. There is no mass.      Tenderness: There is no abdominal tenderness. There is no guarding.   Musculoskeletal:      Cervical back: Normal range of motion and neck supple.   Lymphadenopathy:      Head:      Right side of head: No submental, submandibular, tonsillar, preauricular or posterior auricular adenopathy.      Left side of head: No submental, submandibular, tonsillar, preauricular or posterior auricular adenopathy.       Cervical: No cervical adenopathy.      Right cervical: No superficial or posterior cervical adenopathy.     Left cervical: No superficial or posterior cervical adenopathy.   Skin:     General: Skin is warm and dry.      Findings: No rash.   Neurological:      Mental Status: She is alert and oriented to person, place, and time.      Cranial Nerves: No cranial nerve deficit.      Deep Tendon Reflexes: Reflexes are normal and symmetric.   Psychiatric:         Behavior: Behavior normal. Behavior is cooperative.         Thought Content: Thought content normal.         Judgment: Judgment normal.           Tyrese Leal PA-C  11/5/2023, 10:11 AM

## 2023-11-05 NOTE — PATIENT INSTRUCTIONS
Dear Lenore Martinez,    We are sorry you are not feeling well. Based on the responses you provided, it is recommended that you be seen in-person in urgent care so we can better evaluate your symptoms. Please click here to find the nearest urgent care location to you.   You will not be charged for this Visit. Thank you for trusting us with your care.    Aubree Laguna NP

## 2023-11-06 ENCOUNTER — LAB (OUTPATIENT)
Dept: LAB | Facility: CLINIC | Age: 63
End: 2023-11-06
Payer: COMMERCIAL

## 2023-11-06 DIAGNOSIS — E11.42 DIABETIC POLYNEUROPATHY ASSOCIATED WITH TYPE 2 DIABETES MELLITUS (H): ICD-10-CM

## 2023-11-06 LAB
ALBUMIN SERPL BCG-MCNC: 4.5 G/DL (ref 3.5–5.2)
ALP SERPL-CCNC: 81 U/L (ref 35–104)
ALT SERPL W P-5'-P-CCNC: 16 U/L (ref 0–50)
AST SERPL W P-5'-P-CCNC: 25 U/L (ref 0–45)
BILIRUB DIRECT SERPL-MCNC: <0.2 MG/DL (ref 0–0.3)
BILIRUB SERPL-MCNC: 0.4 MG/DL
PROT SERPL-MCNC: 6.8 G/DL (ref 6.4–8.3)

## 2023-11-06 PROCEDURE — 80076 HEPATIC FUNCTION PANEL: CPT

## 2023-11-06 PROCEDURE — 80171 DRUG SCREEN QUANT GABAPENTIN: CPT | Mod: 90

## 2023-11-06 PROCEDURE — 99000 SPECIMEN HANDLING OFFICE-LAB: CPT

## 2023-11-06 PROCEDURE — 36415 COLL VENOUS BLD VENIPUNCTURE: CPT

## 2023-11-08 ENCOUNTER — PATIENT OUTREACH (OUTPATIENT)
Dept: GASTROENTEROLOGY | Facility: CLINIC | Age: 63
End: 2023-11-08
Payer: COMMERCIAL

## 2023-11-09 LAB — GABAPENTIN SERPLBLD-MCNC: 14.1 UG/ML

## 2023-11-15 NOTE — PROGRESS NOTES
GASTROENTEROLOGY Follow-up VIDEO VISIT    CC/REFERRING MD:    Sudhakar Ledezma  Referred Self    REASON FOR CONSULTATION:   Referred Self for   Chief Complaint   Patient presents with    RECHECK       HISTORY OF PRESENT ILLNESS:    Lenore Martinez is a 63 year old female who is being evaluated via a billable video visit for follow up after EGD, gastroparesis with symptoms of regurgitation and early satiety.    Has been ongoing for 2 years and was progressively worsening. She reported that if she ate more than 1 cup of food, or if she drinks a lot of liquids with meals, she will regurgitate mainly liquids, sometimes undigested foods. This seemed to be worse with soups, mashed potatoes and gravy, and worse if she did not separate food from drink. She has a history of constipation which had improved by the time of our last visit. She was having a bowel movement once every 2-3 days on average.      She is due for her next screening colonoscopy in 11/2023. She underwent gastric emptying study on 5/22/23 which showed delayed gastric emptying with 30% retention at 4 hours. She has an EGD scheduled for 9/7/23.      History 7/19/23:  Today, she reports she has been doing well. Her daughter is a health  interested in dietetics and has been helping her with her gastroparesis diet. She has not met with our GI dietitian (referral is placed) but will consider in the future. She has been limiting fatty foods, proteins, meats, and drinking fluids before eating rather than during or after which seems to help with regurgitation. She has not reduced her fiber intake since she finds this helpful to regulate her bowels. She will still have some effortless regurgitation after meals, mainly of liquids, and occasionally of food several hours after eating. Worse if she bends forward after a meal. Sometimes she states food will come partially up her throat, causing a tickle or a cough. She denies dysphagia to solids or liquids. Reports  very rare nausea, mainly only if she eats fructose. Denies vomiting or abdominal pain. Reports bowel movements are regular and not bothersome.     Interval History 11/16/23:   EGD on 10/19/23 showed 1 cm hiatal hernia, a single gastric polyp, no specimens collected. The endoscopist recommended continuation of gastroparesis diet and consider G-POEM procedure if symptoms persist. The patient is interested in having the G-POEM procedure.     She reports that she has been continuing to follow her daughter's dietary recommendations who is a health . She tries to eat a low fat, low fiber diet. She eats small amounts and avoids drinking large amounts of liquids during meals. If she eats meat she will choose ground meat. She had 2 pieces of pizza the other day spaced out by several minutes and reports her stomach felt full the entire next day so she wasn't able to eat much the next day at all. She continues to have regurgitation of food particularly if she bends over after eating or drinks significant amounts of liquid with meals. She rarely has acid reflux or heartburn. Her weight has been steady. She watches what she eats since she is prediabetic. A1c has been normal at 5.6%.    Bowel movements are somewhat irregular. She has a history of constipation. She has a BSC4 stool every other or every 2 days. Sometimes this is followed by 3 BM in one day. Rarely has loose stools. She has tried miralax without much improvement in the past. She was taking 1/4 capful and increased to 1/2 capful at the time. She tried milk of mag which upset her stomach.     I have reviewed and updated the patient's Past Medical History, Social History, Family History and Medication List.    Exam:    General appearance:  Healthy appearing adult, in no acute distress  Eyes:  Sclera anicteric  Ears, nose, mouth and throat:  No obvious external lesions of ears and nose.  Hearing intact  Neck:  Symmetric, No obvious external lesions  Respiratory:   Normal respiration, no use of accessory muscles   MSK:  No visual upper extremity, neck or facial muscle atrophy  Psychiatric:  Oriented to person, place and time, Appropriate mood and affect.   Neurologic:  Peripheral muscle function and dexterity appear to be intact      PERTINENT STUDIES have been reviewed.    ASSESSMENT/PLAN:    Lenore Martinez is a 63 year old female who presents for follow up of gastroparesis     1. Gastroparesis  2. Regurgitation of food  3. Early satiety  4. Irregular bowel habits    2 years of regurgitation and early satiety. Symptoms have somewhat improved since initiating a gastroparesis diet. She has been using the advice of her daughter who is a health  with special interest in dietetics, so she has not met with our GI dietitian. She will consider this for the future. She continues a low fat diet with cooked vegetables and low in meats that are tough for her to digest. Eating smaller meals. Bowel movements are irregular occurring once every 2-3 days, no abdominal pain or vomiting. She does not have a history of diabetes or recent viral infection, autoimmune disease or neurological disease, and does not take medications known to cause gastroparesis. We reviewed how irregular bowel movements/constipation can contribute to gastroparesis.    -- Handout will be mailed on Diet for Gastroparesis by the [facility]   -- Start 1/2 capful of miralax for 7 days and then increase to 1 capful daily. If you do not notice any softer or more frequent bowel movements, go ahead and increase to 2-3 capfuls daily  -- Reglan prescribed: 5 mg BID with 2 largest meals for 2 months, then discontinue. We reviewed side effects, and that this medication is intended for short term use only.   -- I will reach out to [facility] regarding her desire for G-POEM and inquire if a timed barium esophagram may be helpful   -- Follow up in 2-3 months       Gastric Emptying Study 5/22/23:   FINDINGS:    Percent retention at 60 min = 76%.  Percent retention at 120 min = 63%.  Percent retention at 180 min = 42%.  Percent retention at 240 min = 30%.  T 1/2 emptying time =  153 mins.                                                                   EGD 10/19/23 with 1 cm hiatal hernia, a single gastric polyp.    - metoclopramide (REGLAN) 5 MG tablet; Take 1 tablet (5 mg) by mouth 2 times daily as needed (before largest meals)  Dispense: 120 tablet; Refill: 0  - Adult GI Clinic Follow-Up Order (Blank); Future      Video-Visit Details  Start 8:00 AM  End 8:23 AM  Video Visit Time: 23 minutes  Type of service:  Video Visit  Originating Location (pt. Location): Other car        Distant Location (provider location):  Off-site    Platform used for Video Visit: [employer]    44 minutes spent on the date of the encounter doing chart review, history and exam, documentation and further activities as noted above.    Veronica La PA-C  Division of Gastroenterology, Hepatology, and Nutrition  [facility]       RTC 2-3 months

## 2023-11-16 ENCOUNTER — VIRTUAL VISIT (OUTPATIENT)
Dept: GASTROENTEROLOGY | Facility: CLINIC | Age: 63
End: 2023-11-16
Payer: COMMERCIAL

## 2023-11-16 VITALS — BODY MASS INDEX: 24.66 KG/M2 | HEIGHT: 65 IN | WEIGHT: 148 LBS

## 2023-11-16 DIAGNOSIS — K31.84 GASTROPARESIS: Primary | ICD-10-CM

## 2023-11-16 DIAGNOSIS — R19.8 IRREGULAR BOWEL HABITS: ICD-10-CM

## 2023-11-16 DIAGNOSIS — R68.81 EARLY SATIETY: ICD-10-CM

## 2023-11-16 DIAGNOSIS — R11.10 REGURGITATION OF FOOD: ICD-10-CM

## 2023-11-16 PROCEDURE — 99215 OFFICE O/P EST HI 40 MIN: CPT | Mod: VID | Performed by: STUDENT IN AN ORGANIZED HEALTH CARE EDUCATION/TRAINING PROGRAM

## 2023-11-16 RX ORDER — METOCLOPRAMIDE 5 MG/1
5 TABLET ORAL 2 TIMES DAILY PRN
Qty: 120 TABLET | Refills: 0 | Status: SHIPPED | OUTPATIENT
Start: 2023-11-16 | End: 2024-01-11 | Stop reason: SINTOL

## 2023-11-16 ASSESSMENT — PAIN SCALES - GENERAL: PAINLEVEL: NO PAIN (0)

## 2023-11-16 NOTE — PATIENT INSTRUCTIONS
It was a pleasure taking care of you today.  I've included a brief summary of our discussion and care plan from today's visit below.  Please review this information with your primary care provider.  _______________________________________________________________________     My recommendations are summarized as follows:   -- Start 1/2 capful of miralax for 7 days and then increase to 1 capful daily. If you do not notice any softer or more frequent bowel movements, go ahead and increase to 2-3 capfuls daily. This will help the stools move easier through the colon and not back up and slow the stomach emptying further   -- Reglan sent to pharmacy to increase stomach emptying. Use 5 mg before your 2 largest meals of the day. If you are just going to eat one meal, just take once. Please stop this medication and let me know right away if you develop drowsiness, dizziness, confusion, involuntary limb movements, tightness in your muscles, restlessness.   -- I will mail you a copy of the Diet for Gastroparesis by [facility] which has great dietary recommendations   -- Follow up in 2-3 months    ______________________________________________________________________     How do I schedule labs, imaging studies, or procedures that were ordered in clinic today?      Labs: To schedule lab appointment you can contact your local [facility] or call [phone] to schedule at any convenient [facility] location.      Procedures: If a colonoscopy, upper endoscopy, breath test, esophageal manometry, or pH impedence was ordered today, our endoscopy team will call you to schedule this. If you have not heard from our endoscopy team within a week, please call [phone] to schedule.      Imaging Studies: If you were scheduled for a CT scan, X-ray, MRI, ultrasound, HIDA scan or other imaging study, please call [phone] to have this scheduled.      Referral: If a referral to another specialty was ordered, expect a  phone call or follow instructions above. If you have not heard from anyone regarding your referral in a week, please call our clinic to check the status.      Who do I call with any questions after my visit?  Please be in touch if there are any further questions that arise following today's visit.  There are multiple ways to contact your gastroenterology care team.       During business hours, you may reach a Gastroenterology nurse at [phone]     To schedule or reschedule an appointment, please call [phone].      You can always send a secure message through [employer].  [employer] messages are answered by your nurse or doctor typically within 24 hours.  Please allow extra time on weekends and holidays.       For urgent/emergent questions after business hours, you may reach the on-call GI Fellow by contacting the [facility]  at [phone].     How will I get the results of any tests ordered?    You will receive all of your results.  If you have signed up for [employer], any tests ordered at your visit will be available to you after your physician reviews them.  Typically this takes 1-2 weeks.  If there are urgent results that require a change in your care plan, your physician or nurse will call you to discuss the next steps.       What is Ranberry?  Ranberry is a secure way for you to access all of your healthcare records from the [facility].  It is a web based computer program, so you can sign on to it from any location.  It also allows you to send secure messages to your care team.  I recommend signing up for Ranberry access if you have not already done so and are comfortable with using a computer.       How to I schedule a follow-up visit?  If you did not schedule a follow-up visit today, please call [phone] to schedule a follow-up office visit.

## 2023-11-16 NOTE — NURSING NOTE
Is the patient currently in the state of [place]? YES    Visit mode:VIDEO    If the visit is dropped, the patient can be reconnected by: VIDEO VISIT: Text to cell phone:   Telephone Information:   Mobile [phone]       Will anyone else be joining the visit? NO  (If patient encounters technical issues they should call [phone]    How would you like to obtain your AVS? MyChart    Are changes needed to the allergy or medication list? No    Reason for visit: GILBERTO KRISHNAMURTHY

## 2023-11-16 NOTE — LETTER
11/16/2023         RE: Lenore Martinez  [address]  [place] [address]        Dear Colleague,    Thank you for referring your patient, Lenore Martinez, to the [facility]STROENTEROLOG[facility] [place]. Please see a copy of my visit note below.        GASTROENTEROLOGY Follow-up VIDEO VISIT    CC/REFERRING MD:    Sudhakar Ledezma  Referred Self    REASON FOR CONSULTATION:   Referred Self for   Chief Complaint   Patient presents with    RECHECK       HISTORY OF PRESENT ILLNESS:    Lenore Martinez is a 63 year old female who is being evaluated via a billable video visit for follow up after EGD, gastroparesis with symptoms of regurgitation and early satiety.    Has been ongoing for 2 years and was progressively worsening. She reported that if she ate more than 1 cup of food, or if she drinks a lot of liquids with meals, she will regurgitate mainly liquids, sometimes undigested foods. This seemed to be worse with soups, mashed potatoes and gravy, and worse if she did not separate food from drink. She has a history of constipation which had improved by the time of our last visit. She was having a bowel movement once every 2-3 days on average.      She is due for her next screening colonoscopy in 11/2023. She underwent gastric emptying study on 5/22/23 which showed delayed gastric emptying with 30% retention at 4 hours. She has an EGD scheduled for 9/7/23.      History 7/19/23:  Today, she reports she has been doing well. Her daughter is a health  interested in dietetics and has been helping her with her gastroparesis diet. She has not met with our GI dietitian (referral is placed) but will consider in the future. She has been limiting fatty foods, proteins, meats, and drinking fluids before eating rather than during or after which seems to help with regurgitation. She has not reduced her fiber intake since she finds this helpful to regulate her bowels. She will still have some effortless  regurgitation after meals, mainly of liquids, and occasionally of food several hours after eating. Worse if she bends forward after a meal. Sometimes she states food will come partially up her throat, causing a tickle or a cough. She denies dysphagia to solids or liquids. Reports very rare nausea, mainly only if she eats fructose. Denies vomiting or abdominal pain. Reports bowel movements are regular and not bothersome.     Interval History 11/16/23:   EGD on 10/19/23 showed 1 cm hiatal hernia, a single gastric polyp, no specimens collected. The endoscopist recommended continuation of gastroparesis diet and consider G-POEM procedure if symptoms persist. The patient is interested in having the G-POEM procedure.     She reports that she has been continuing to follow her daughter's dietary recommendations who is a health . She tries to eat a low fat, low fiber diet. She eats small amounts and avoids drinking large amounts of liquids during meals. If she eats meat she will choose ground meat. She had 2 pieces of pizza the other day spaced out by several minutes and reports her stomach felt full the entire next day so she wasn't able to eat much the next day at all. She continues to have regurgitation of food particularly if she bends over after eating or drinks significant amounts of liquid with meals. She rarely has acid reflux or heartburn. Her weight has been steady. She watches what she eats since she is prediabetic. A1c has been normal at 5.6%.    Bowel movements are somewhat irregular. She has a history of constipation. She has a BSC4 stool every other or every 2 days. Sometimes this is followed by 3 BM in one day. Rarely has loose stools. She has tried miralax without much improvement in the past. She was taking 1/4 capful and increased to 1/2 capful at the time. She tried milk of mag which upset her stomach.     I have reviewed and updated the patient's Past Medical History, Social History, Family History  and Medication List.    Exam:    General appearance:  Healthy appearing adult, in no acute distress  Eyes:  Sclera anicteric  Ears, nose, mouth and throat:  No obvious external lesions of ears and nose.  Hearing intact  Neck:  Symmetric, No obvious external lesions  Respiratory:  Normal respiration, no use of accessory muscles   MSK:  No visual upper extremity, neck or facial muscle atrophy  Psychiatric:  Oriented to person, place and time, Appropriate mood and affect.   Neurologic:  Peripheral muscle function and dexterity appear to be intact      PERTINENT STUDIES have been reviewed.    ASSESSMENT/PLAN:    Lenore Martinez is a 63 year old female who presents for follow up of gastroparesis     1. Gastroparesis  2. Regurgitation of food  3. Early satiety  4. Irregular bowel habits    2 years of regurgitation and early satiety. Symptoms have somewhat improved since initiating a gastroparesis diet. She has been using the advice of her daughter who is a health  with special interest in dietetics, so she has not met with our GI dietitian. She will consider this for the future. She continues a low fat diet with cooked vegetables and low in meats that are tough for her to digest. Eating smaller meals. Bowel movements are irregular occurring once every 2-3 days, no abdominal pain or vomiting. She does not have a history of diabetes or recent viral infection, autoimmune disease or neurological disease, and does not take medications known to cause gastroparesis. We reviewed how irregular bowel movements/constipation can contribute to gastroparesis.    -- Handout will be mailed on Diet for Gastroparesis by the [facility]   -- Start 1/2 capful of miralax for 7 days and then increase to 1 capful daily. If you do not notice any softer or more frequent bowel movements, go ahead and increase to 2-3 capfuls daily  -- Reglan prescribed: 5 mg BID with 2 largest meals for 2 months, then discontinue. We reviewed side  effects, and that this medication is intended for short term use only.   -- I will reach out to [facility] regarding her desire for G-POEM and inquire if a timed barium esophagram may be helpful   -- Follow up in 2-3 months       Gastric Emptying Study 5/22/23:   FINDINGS:   Percent retention at 60 min = 76%.  Percent retention at 120 min = 63%.  Percent retention at 180 min = 42%.  Percent retention at 240 min = 30%.  T 1/2 emptying time =  153 mins.                                                                   EGD 10/19/23 with 1 cm hiatal hernia, a single gastric polyp.    - metoclopramide (REGLAN) 5 MG tablet; Take 1 tablet (5 mg) by mouth 2 times daily as needed (before largest meals)  Dispense: 120 tablet; Refill: 0  - Adult GI Clinic Follow-Up Order (Blank); Future        RTC 2-3 months          Again, thank you for allowing me to participate in the care of your patient.      Sincerely,    Veronica La PA-C

## 2023-11-30 ENCOUNTER — OFFICE VISIT (OUTPATIENT)
Dept: ORTHOPEDICS | Facility: CLINIC | Age: 63
End: 2023-11-30
Payer: COMMERCIAL

## 2023-11-30 VITALS — HEIGHT: 65 IN | BODY MASS INDEX: 24.63 KG/M2

## 2023-11-30 DIAGNOSIS — M65.312 TRIGGER THUMB OF LEFT HAND: Primary | ICD-10-CM

## 2023-11-30 PROCEDURE — 99214 OFFICE O/P EST MOD 30 MIN: CPT | Performed by: FAMILY MEDICINE

## 2023-11-30 ASSESSMENT — PAIN SCALES - GENERAL: PAINLEVEL: MODERATE PAIN (5)

## 2023-11-30 NOTE — PROGRESS NOTES
ASSESSMENT & PLAN    Lenore was seen today for pain and follow up.    Diagnoses and all orders for this visit:    Trigger thumb of left hand  -     Orthopedic  Referral; Future        # Left Thumb Trigger Thumb: Lenore Martinez  was seen today for left thumb pain.  She was seen on 7/6/2023 where a left trigger thumb injection, a CMC joint steroid injection was completed on 9/28/23 that helped her pain at the base of the thumb significantly.  On examination there is palpable triggering over the left thumb.  Previous x-rays showing arthritis at this joint.  Likely cause her pain due to flare of CMC arthritis.  Given this plan to treat as below and follow-up if not improving.     Image Findings: Left CMC arthritis on previous x-rays reviewed with patient  Treatment: Activities as tolerated, thumb brace as needed, ortho hand surgery referral placed  Job: As tolerated  Medications/Injections: Limited tylenol/ibuprofen for pain for 1-2 weeks, topical Voltaren gel, left thumb CMC joint steroid injection  Follow-up: Hand surgery    -----    SUBJECTIVE:  Lenore Martinez is a 63 year old female who is seen in follow-up for left thumb pain. They were last seen 9/28/2023 and left CMC steroid injection was performed.  Provided good relief for 2 months. The patient is seen by themselves.    Since their last visit reports returned swelling IP joint.  They indicate that their current pain level is 5/10. They have tried left CMC steroid injection 9/28/23, left thumb trigger finger injection 7/6/23, compression, thumb brace, heat  ice, Tylenol.  The triggering improved for 2 months. Wants to discuss possible surgical options.       Patient's past medical, surgical, social, and family histories were reviewed today and no changes are noted.    REVIEW OF SYSTEMS:  Constitutional: NEGATIVE for fever, chills, change in weight  Skin: NEGATIVE for worrisome rashes, moles or lesions  GI/: NEGATIVE for bowel or bladder  "changes  Neuro: NEGATIVE for weakness, dizziness or paresthesias    OBJECTIVE:  Ht 1.651 m (5' 5\")   BMI 24.63 kg/m     General: healthy, alert and in no distress  HEENT: no scleral icterus or conjunctival erythema  Skin: no suspicious lesions or rash. No jaundice.  CV: regular rhythm by palpation, no pedal edema  Resp: normal respiratory effort without conversational dyspnea   Psych: normal mood and affect  Gait: normal steady gait with appropriate coordination and balance  Neuro: normal light touch sensory exam of the extremities.    MSK:    Ortho Exam   LEFT HAND  Inspection:    No swelling, bruising, discoloration, or obvious deformity or asymmetry  Palpation:   Carpals: normal   Metacarpals: normal   Thumb: triggering, TTP over the base of the thumb volar side   Fingers: normal  Range of Motion:    Full active flexion and extension at MCP, PIP, and DIP joints; normal finger cascade without malrotation.  Wrist pronation, supination, and ulnar/radial deviation normal.  Strength:     full, extension full, flexion full, abduction full, adduction full, opposition full  Special Tests:    Positive: palpable triggering left 1st digit A1 pulley    Negative: flexor digitorum superficialis testing, flexor digitorum profundus testing       Independent visualization of the below image:   Reviewed previous left hand x-rays    Zhang Dempsey MD, [facility]    Disclaimer: This note consists of symbols derived from keyboarding, dictation and/or voice recognition software. As a result, there may be errors in the script that have gone undetected. Please consider this when interpreting information found in this chart.    "

## 2023-11-30 NOTE — PATIENT INSTRUCTIONS
# Left Thumb Trigger Thumb: Lenore Martinez  was seen today for left thumb pain.  She was seen on 7/6/2023 where a left trigger thumb injection, a CMC joint steroid injection was completed on 9/28/23 that helped her pain at the base of the thumb significantly.  On examination there is palpable triggering over the left thumb.  Previous x-rays showing arthritis at this joint.  Likely cause her pain due to flare of CMC arthritis.  Given this plan to treat as below and follow-up if not improving.     Image Findings: Left CMC arthritis on previous x-rays reviewed with patient  Treatment: Activities as tolerated, thumb brace as needed, ortho hand surgery referral placed  Job: As tolerated  Medications/Injections: Limited tylenol/ibuprofen for pain for 1-2 weeks, topical Voltaren gel, left thumb CMC joint steroid injection  Follow-up: Hand surgery    Please call [phone]   Ask for my team if you have any questions or concerns    If you have not yet received the influenza vaccine but would like to get one, please call  [phone] or you can schedule via [employer]    It was great seeing you again today!    Zhang Dempsey MD, CAQSM

## 2023-11-30 NOTE — LETTER
11/30/2023         RE: Lenore Martinez  [address] [place] [address]        Dear Colleague,    Thank you for referring your patient, Lenore Martinez, to the [facility] [place]. Please see a copy of my visit note below.    ASSESSMENT & PLAN    Lenore was seen today for pain and follow up.    Diagnoses and all orders for this visit:    Trigger thumb of left hand  -     Orthopedic  Referral; Future        # Left Thumb Trigger Thumb: Lenore Martinez  was seen today for left thumb pain.  She was seen on 7/6/2023 where a left trigger thumb injection, a CMC joint steroid injection was completed on 9/28/23 that helped her pain at the base of the thumb significantly.  On examination there is palpable triggering over the left thumb.  Previous x-rays showing arthritis at this joint.  Likely cause her pain due to flare of CMC arthritis.  Given this plan to treat as below and follow-up if not improving.     Image Findings: Left CMC arthritis on previous x-rays reviewed with patient  Treatment: Activities as tolerated, thumb brace as needed, ortho hand surgery referral placed  Job: As tolerated  Medications/Injections: Limited tylenol/ibuprofen for pain for 1-2 weeks, topical Voltaren gel, left thumb CMC joint steroid injection  Follow-up: Hand surgery    -----    SUBJECTIVE:  Lenore Martinez is a 63 year old female who is seen in follow-up for left thumb pain. They were last seen 9/28/2023 and left CMC steroid injection was performed.  Provided good relief for 2 months. The patient is seen by themselves.    Since their last visit reports returned swelling IP joint.  They indicate that their current pain level is 5/10. They have tried left CMC steroid injection 9/28/23, left thumb trigger finger injection 7/6/23, compression, thumb brace, heat  ice, Tylenol.  The triggering improved for 2 months. Wants to discuss possible surgical options.       Patient's past medical, surgical,  "social, and family histories were reviewed today and no changes are noted.    REVIEW OF SYSTEMS:  Constitutional: NEGATIVE for fever, chills, change in weight  Skin: NEGATIVE for worrisome rashes, moles or lesions  GI/: NEGATIVE for bowel or bladder changes  Neuro: NEGATIVE for weakness, dizziness or paresthesias    OBJECTIVE:  Ht 1.651 m (5' 5\")   BMI 24.63 kg/m     General: healthy, alert and in no distress  HEENT: no scleral icterus or conjunctival erythema  Skin: no suspicious lesions or rash. No jaundice.  CV: regular rhythm by palpation, no pedal edema  Resp: normal respiratory effort without conversational dyspnea   Psych: normal mood and affect  Gait: normal steady gait with appropriate coordination and balance  Neuro: normal light touch sensory exam of the extremities.    MSK:    Ortho Exam   LEFT HAND  Inspection:    No swelling, bruising, discoloration, or obvious deformity or asymmetry  Palpation:   Carpals: normal   Metacarpals: normal   Thumb: triggering, TTP over the base of the thumb volar side   Fingers: normal  Range of Motion:    Full active flexion and extension at MCP, PIP, and DIP joints; normal finger cascade without malrotation.  Wrist pronation, supination, and ulnar/radial deviation normal.  Strength:     full, extension full, flexion full, abduction full, adduction full, opposition full  Special Tests:    Positive: palpable triggering left 1st digit A1 pulley    Negative: flexor digitorum superficialis testing, flexor digitorum profundus testing       Independent visualization of the below image:   Reviewed previous left hand x-rays    Zhang Dempsey MD, [facility]    Disclaimer: This note consists of symbols derived from keyboarding, dictation and/or voice recognition software. As a result, there may be errors in the script that have gone undetected. Please consider this when interpreting information found in this chart.      Again, thank you for " allowing me to participate in the care of your patient.        Sincerely,        Zhang Dempsey MD

## 2023-12-14 NOTE — PROGRESS NOTES
NEUROLOGY FOLLOW UP VISIT  NOTE       [facility] [place]  [address] [place] [address]  Tel: [phone]  Fax: [phone]  [email or web address]     Lenore Martinez,  1960, MRN [id number]  PCP: Nona Oropeza  Date: 10/26/2021      ASSESSMENT & PLAN     Visit Diagnosis  Diabetic polyneuropathy associated with diabetes mellitus due to underlying condition (H)     Diabetic polyneuropathy  61-year-old female with history of bipolar disorder, PTSD who is been followed in our clinic for more than 10 years for polyneuropathy.  She has borderline elevated hemoglobin A1c and her EMG today confirms length dependent mixed sensorimotor polyneuropathy.  Compared to her previous EMG done in 2018 there is minimal change.  I have asked her to continue on gabapentin.  Recent lab work included normal folate, vitamin B1, B6, B12 and a gabapentin level.  Her hemoglobin A1c was elevated at 6.1.  I have asked her to continue on current dose of gabapentin 1200 mg twice daily.  Prescription was filled during her previous visit.  Regular follow-up will be in 1 year    Thank you again for this referral, please feel free to contact me if you have any questions.    Kedar Conn MD  [facility]  (Formerly, [facility]     HISTORY OF PRESENT ILLNESS     Patient is a 61-year-old female with history of bipolar disorder, PTSD who is been followed in our clinic for complaint of numbness and tingling in the hands and feet.  She had painful dysesthesias and had an evaluation done at [facility] but no etiology was found.  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  EMG in  showed borderline reduced amplitude for the sural potential compatible with early electrophysiological sensory neuropathy.  She was started on Neurontin.  As she was having right-sided tremors and MRI  was done and although patient does not think this was a major issue the diagnosis of essential tremor was entertained.    She also has history of migraine headaches usually preceded by an aura that are easily aborted with Excedrin Migraine.  She also has bipolar disorder and PTSD and takes lamotrigine and Celexa.  During her last visit she had reported that her symptoms are progressively getting worse and had a repeat EMG done today that shows low amplitude compound motor action potential and also sural SNAP suggesting length dependent sensorimotor polyneuropathy.  Lab work for common causes of neuropathy was normal except her hemoglobin A1c was 6.1 suggesting she is prediabetic.  She also had a gabapentin level checked during her last visit that was therapeutic.     PROBLEM LIST   Patient Active Problem List   Diagnosis Code     PTSD (post-traumatic stress disorder) F43.10     CARDIOVASCULAR SCREENING; LDL GOAL LESS THAN 160 Z13.6     Hypovitaminosis D E55.9     Migraine with aura  G43.109     Bipolar disease, chronic (H) F31.9     Diabetic neuropathy (H) E11.40         PAST MEDICAL & SURGICAL HISTORY     Past Medical History:   Patient  has a past medical history of Anxiety, Bipolar disease, chronic (H), Diabetic neuropathy (H) (6/3/2021), Hypovitaminosis D (5/12/2014), Iron deficiency, Migraines, Neuropathy, and PTSD (post-traumatic stress disorder) (4/16/2013).    Surgical History:  She  has a past surgical history that includes hysterectomy, pap no longer indicated (10/1/2009); REMOVAL OF OVARIAN CYST(S) (1987); TOOTH EXTRACTION W/FORCEP (1987); and Myringotomy.     SOCIAL HISTORY     Reviewed, and she  reports that she has never smoked. She has never used smokeless tobacco. She reports current alcohol use. She reports that she does not use drugs.     FAMILY HISTORY     Reviewed, and family history includes Bipolar Disorder in her son; Diabetes in her paternal grandmother and son.     ALLERGIES     Allergies  "  Allergen Reactions     Compazine      Anxiety           REVIEW OF SYSTEMS     A 12 point review of system was performed and was negative except as outlined in the history of present illness.     HOME MEDICATIONS     Current Outpatient Rx   Medication Sig Dispense Refill     albuterol (PROAIR HFA/PROVENTIL HFA/VENTOLIN HFA) 108 (90 Base) MCG/ACT inhaler Inhale 2 puffs into the lungs every 4 hours as needed for shortness of breath / dyspnea or wheezing Via spacer 8.5 g 1     Aspirin-Acetaminophen-Caffeine (EXCEDRIN MIGRAINE PO) Take  by mouth as needed.       cetirizine (ZYRTEC) 10 MG tablet Take 1 tablet (10 mg) by mouth daily 30 tablet 1     citalopram (CELEXA) 20 MG tablet Take 20 mg by mouth daily       gabapentin (NEURONTIN) 600 MG tablet Take 2 tablets (1,200 mg) by mouth 2 times daily 120 tablet 11     lamoTRIgine (LAMICTAL) 100 MG tablet Take 100 mg by mouth 3 times daily.       omeprazole (PRILOSEC) 20 MG DR capsule Take 1 capsule (20 mg) by mouth daily 15 capsule 1     Vitamin D, Cholecalciferol, 1000 units TABS Take 1,000 mg by mouth daily       citalopram (CELEXA) 40 MG tablet Take 40 mg by mouth daily. (Patient not taking: Reported on 10/26/2021)           PHYSICAL EXAM     Vital signs  BP 93/60 (BP Location: Left arm, Patient Position: Sitting)   Pulse 74   Ht 1.651 m (5' 5\")   Wt 64.9 kg (143 lb)   BMI 23.80 kg/m      Weight:   143 lbs 0 oz    Patient is alert and oriented x4 in no acute distress. Vital signs were reviewed and are documented in electronic medical record. Neck was supple, no carotid bruits, thyromegaly, JVD, or lymphadenopathy was noted.   NEUROLOGY EXAM:    Patient s speech was normal with no aphasia or dysarthria. Mentation, and affect were also normal.     Funduscopic exam was normal, with normal cup to disc ratio. Cranial nerves II -XII were intact.     Patient had normal mass, tone and motor strength was 5/5 in all extremities without pronator drift.     Sensation was intact " to light touch, pinprick, and vibratory sensation.     Reflexes were 1+ symmetrical with downgoing toes.     No dysmetria noted on FNF or HKS. Romberg was negative.    Gait testing was normal. Able to walk on toes/heels. Tandem walk normal.     DIAGNOSTIC STUDIES     PERTINENT RADIOLOGY  Following imaging studies were reviewed:     MRI BRAIN 7/9/2019  1.  No evidence of acute intracranial hemorrhage, mass effect, or infarction.  2.  Mild brain parenchymal volume loss.     EMG 10/26/2021  This is a borderline abnormal EEG that suggests length dependent mixed sensorimotor polyneuropathy.  This likely is due to patient's known prediabetes, clinical correlation is recommended.    EMG 8/21/2018  This is a normal study.  There is no electrophysiological evidence of a diffuse neuropathy, myopathy, or radiculpathy to affect the tested extremity muscles.      EMG 2/8/2011  This EMG nerve conduction study showed borderline reduced amplitude for the sural potentials, compatible with a very mild electrophysiological sensory neuropathy of the lower extremities.  Clinical correlation is recommended     PERTINENT LABS  Following labs were reviewed:  No visits with results within 3 Month(s) from this visit.   Latest known visit with results is:   Orders Only on 06/11/2021   Component Date Value     Gabapentin Level 06/11/2021 10.4      Vitamin B1 06/11/2021 42*     Vitamin B12 06/11/2021 374      Folate 06/11/2021 8.9      Bilirubin Direct 06/11/2021 0.2      Bilirubin Total 06/11/2021 0.9      Albumin 06/11/2021 4.2      Protein Total 06/11/2021 6.8      Alkaline Phosphatase 06/11/2021 73      ALT 06/11/2021 19      AST 06/11/2021 14      Hemoglobin A1C 06/11/2021 6.1*         Total time spent for face to face visit, reviewing labs/imaging studies, counseling and coordination of care was: 20 minutes spent on the date of the encounter doing chart review, review of outside records, review of test results, interpretation of tests,  patient visit and documentation     This note was dictated using voice recognition software.  Any grammatical or context distortions are unintentional and inherent to the software.    No orders of the defined types were placed in this encounter.     New Prescriptions    No medications on file     Modified Medications    No medications on file                  [Follow-Up: _____] : a [unfilled] follow-up visit

## 2023-12-18 ENCOUNTER — OFFICE VISIT (OUTPATIENT)
Dept: ORTHOPEDICS | Facility: CLINIC | Age: 63
End: 2023-12-18
Attending: FAMILY MEDICINE
Payer: COMMERCIAL

## 2023-12-18 ENCOUNTER — TELEPHONE (OUTPATIENT)
Dept: ORTHOPEDICS | Facility: CLINIC | Age: 63
End: 2023-12-18

## 2023-12-18 VITALS
BODY MASS INDEX: 24.66 KG/M2 | DIASTOLIC BLOOD PRESSURE: 65 MMHG | SYSTOLIC BLOOD PRESSURE: 92 MMHG | RESPIRATION RATE: 18 BRPM | HEART RATE: 81 BPM | HEIGHT: 65 IN | WEIGHT: 148 LBS

## 2023-12-18 DIAGNOSIS — M18.12 PRIMARY OSTEOARTHRITIS OF FIRST CARPOMETACARPAL JOINT OF LEFT HAND: Primary | ICD-10-CM

## 2023-12-18 DIAGNOSIS — M65.312 TRIGGER THUMB OF LEFT HAND: ICD-10-CM

## 2023-12-18 PROCEDURE — 99203 OFFICE O/P NEW LOW 30 MIN: CPT | Performed by: ORTHOPAEDIC SURGERY

## 2023-12-18 NOTE — LETTER
12/18/2023         RE: Lenore Martinez  [address]  [place] [address]        Dear Colleague,    Thank you for referring your patient, Lenore Martinez, [facility]. Please see a copy of my visit note below.    Lenore Martinez is a 63 year old female who is seen in consultation at the request of Dr. Dempsey for left thumb pain.  She has had pain since at least summer 2023.  No definite history of injury recently.  She does recall remote trauma to the thumb many years ago.  She had left trigger thumb injection on 7/6/2023.  This did help pain.  She then had left CMC joint injection on 9/28/2023.  This also helped pain.  She has return of pain from both areas now.  She describes occasional sharp pain rated 5 out of 10.  It is worse with lifting objects.  Better with ibuprofen.  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    X-ray of the hand shows severe osteoarthritis of the thumb CMC joint with bone-on-bone.    Past Medical History:   Diagnosis Date     Anxiety      Bipolar disease, chronic (H)      Diabetic neuropathy (H) 06/03/2021     Hyperlipidemia LDL goal <70      Hypovitaminosis D 05/12/2014     Iron deficiency      Migraines      Neuropathy      Primary osteoarthritis of first carpometacarpal joint of left hand 12/18/2023     PTSD (post-traumatic stress disorder) 04/16/2013    Diagnosed 11/2005       Past Surgical History:   Procedure Laterality Date     COLONOSCOPY N/A 11/4/2022    Procedure: COLONOSCOPY, WITH POLYPECTOMY With Clips;  Surgeon: Darien Norris MD;  Location: [facility] OR     ESOPHAGOSCOPY, GASTROSCOPY, DUODENOSCOPY (EGD), COMBINED N/A 10/19/2023    Procedure: Esophagoscopy, gastroscopy, duodenoscopy (EGD), combined;  Surgeon: Cira Chiu MD;  Location: [facility]     HC REMOVAL OF OVARIAN CYST(S)  1987     HC TOOTH EXTRACTION W/FORCEP  1987     HYSTERECTOMY, PAP NO LONGER INDICATED  10/1/2009    has ovaries     MYRINGOTOMY      as  a child        Family History   Problem Relation Age of Onset     Hyperlipidemia Mother              Mental Illness Mother      Mental Illness Sister         Bipolar     Diabetes Brother      Mental Illness Brother         Schizophrenic     Substance Abuse Brother      Mental Illness Brother         Schizophrenic Shot himself     Substance Abuse Brother          under drug inducement     Cerebrovascular Disease Maternal Grandfather      Diabetes Paternal Grandmother         Type2     Mental Illness Paternal Grandmother      Diabetes Son         Type1     Mental Illness Son         Bipolar     Thyroid Disease Son      Bipolar Disorder Son      Coronary Artery Disease No family hx of      Cancer No family hx of        Social History     Socioeconomic History     Marital status:      Spouse name: Arturo      Number of children: 2     Years of education: 14     Highest education level: Not on file   Occupational History     Occupation: home health aide      Employer: [employer]   Tobacco Use     Smoking status: Never     Smokeless tobacco: Never     Tobacco comments:     smoke free household.   Vaping Use     Vaping Use: Never used   Substance and Sexual Activity     Alcohol use: Yes     Comment: 1-2 a month     Drug use: No     Sexual activity: Not Currently     Partners: Male     Birth control/protection: Female Surgical   Other Topics Concern     Parent/sibling w/ CABG, MI or angioplasty before 65F 55M? Not Asked   Social History Narrative     Not on file     Social Determinants of Health     Financial Resource Strain: Not on file   Food Insecurity: Not on file   Transportation Needs: Not on file   Physical Activity: Not on file   Stress: Not on file   Social Connections: Not on file   Interpersonal Safety: Not on file   Housing Stability: Not on file       Current Outpatient Medications   Medication Sig Dispense Refill     Aspirin-Acetaminophen-Caffeine (EXCEDRIN MIGRAINE PO) Take  by mouth as needed.        benzonatate (TESSALON) 200 MG capsule Take 1 capsule (200 mg) by mouth 3 times daily as needed for cough 30 capsule 0     citalopram (CELEXA) 20 MG tablet Take 20 mg by mouth 3 times daily       Digestive Enzymes (DIGESTIVE ENZYME PO) [employer] digestive enzymes       fluticasone (FLONASE) 50 MCG/ACT nasal spray Spray 1 spray into both nostrils daily 16 g 0     gabapentin (NEURONTIN) 600 MG tablet Take 2 tablets (1200 mg) by mouth 3 times daily 180 tablet 11     guaiFENesin 1200 MG TB12 Take 1 tablet (1,200 mg) by mouth 2 times daily 60 tablet 0     lamoTRIgine (LAMICTAL) 100 MG tablet Take 100 mg by mouth 3 times daily.       metoclopramide (REGLAN) 5 MG tablet Take 1 tablet (5 mg) by mouth 2 times daily as needed (before largest meals) 120 tablet 0     pseudoePHEDrine (SUDAFED) 120 MG 12 hr tablet Take 1 tablet (120 mg) by mouth every 12 hours 15 tablet 0     Specialty Vitamins Products (BRAIN PO) [facility] dynamic brain supplement       Vitamin D, Cholecalciferol, 1000 units TABS Take 1,000 mg by mouth daily         Allergies   Allergen Reactions     Compazine      Anxiety         REVIEW OF SYSTEMS:  CONSTITUTIONAL:  NEGATIVE for fever, chills, change in weight, not feeling tired  SKIN:  NEGATIVE for worrisome rashes, no skin lumps, no skin ulcers and no non-healing wounds  EYES:  NEGATIVE for vision changes or irritation.  ENT/MOUTH:  NEGATIVE.  No hearing loss, no hoarseness, no difficulty swallowing.  RESP:  NEGATIVE. No cough or shortness of breath.  CV:  NEGATIVE for chest pain, palpitations or peripheral edema  GI:  NEGATIVE for nausea, abdominal pain, heartburn, or change in bowel habits  :  Negative. No dysuria, no hematuria  MUSCULOSKELETAL:  See HPI above  NEURO:  NEGATIVE . No headaches, no dizziness,  no numbness  ENDOCRINE:  NEGATIVE for temperature intolerance, skin/hair changes  HEME/ALLERGY/IMMUNE:  NEGATIVE for bleeding problems  PSYCHIATRIC:  NEGATIVE. no anxiety, no  "depression.     Exam:  Vitals: BP 92/65   Pulse 81   Resp 18   Ht 1.651 m (5' 5\")   Wt 67.1 kg (148 lb)   BMI 24.63 kg/m    BMI= Body mass index is 24.63 kg/m .  Constitutional:  healthy, alert and no distress  Neuro: Alert and Oriented x 3, no focal defects.  Psych: Affect normal   Respiratory: Breathing not labored.  Cardiovascular: normal peripheral pulses  Lymph: no adenopathy  Skin: No rashes,worrisome lesions or skin problems  Left thumb CMC joint shows tenderness and mild prominence.  She has positive grind test of the thumb CMC joint.  She has a palmaris longus tendon present.  She also has triggering noted of the left thumb with tenderness at the A1 pulley.  I can feel the tendon nodule move.  Sensation, motor and circulation are intact.  Negative Tinel at the median nerve.    Assessment:  1. Left thumb carpometacarpal osteoarthritis.  2. Left trigger thumb.    Plan: We discussed surgical options for her, as she has failed ibuprofen use and steroid injections.  If we do trapezial excision with interposition arthroplasty and trigger thumb simultaneously, she may develop significant stiffness from the trigger thumb as it cannot be moved in the thumb spica splint for a month.  We discussed doing them individually.  If we do the CMC joint arthroplasty first, the thumb will have a month breast and may quiet down and not need surgery.  We have decided to proceed with this plan.  This will be done in same-day surgery with general anesthetic.  Left trapezial excision with palmaris longus interposition arthroplasty     Again, thank you for allowing me to participate in the care of your patient.        Sincerely,        Nasim Nice MD  "

## 2023-12-18 NOTE — PATIENT INSTRUCTIONS
Surgery:  left trapezial excision with palmaris longus interposition arthroplasty     Preop physical with primary physician is needed within 30 days of the surgery.  Nothing to eat or drink for 8 hours before surgery.  For same day surgery you need a ride.  Someone should stay with you for 12 hours afterward.  Stop blood thinners 5 days before surgery (aspirin, warfarin, anti-inflammatories).      Surgery schedulers will call you to schedule surgery.    If you don't get a call in the next few days, then call [phone] to schedule your surgery for [place] or [phone] to schedule for [place].

## 2023-12-18 NOTE — PROGRESS NOTES
Lenore Martinez is a 63 year old female who is seen in consultation at the request of Dr. Dempsey for left thumb pain.  She has had pain since at least summer 2023.  No definite history of injury recently.  She does recall remote trauma to the thumb many years ago.  She had left trigger thumb injection on 2023.  This did help pain.  She then had left CMC joint injection on 2023.  This also helped pain.  She has return of pain from both areas now.  She describes occasional sharp pain rated 5 out of 10.  It is worse with lifting objects.  Better with ibuprofen.  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    X-ray of the hand shows severe osteoarthritis of the thumb CMC joint with bone-on-bone.    Past Medical History:   Diagnosis Date    Anxiety     Bipolar disease, chronic (H)     Diabetic neuropathy (H) 2021    Hyperlipidemia LDL goal <70     Hypovitaminosis D 2014    Iron deficiency     Migraines     Neuropathy     Primary osteoarthritis of first carpometacarpal joint of left hand 2023    PTSD (post-traumatic stress disorder) 2013    Diagnosed 2005       Past Surgical History:   Procedure Laterality Date    COLONOSCOPY N/A 2022    Procedure: COLONOSCOPY, WITH POLYPECTOMY With Clips;  Surgeon: Darien Norris MD;  Location: [facility] OR    ESOPHAGOSCOPY, GASTROSCOPY, DUODENOSCOPY (EGD), COMBINED N/A 10/19/2023    Procedure: Esophagoscopy, gastroscopy, duodenoscopy (EGD), combined;  Surgeon: Cira Chiu MD;  Location: [facility]    HC REMOVAL OF OVARIAN CYST(S)      HC TOOTH EXTRACTION W/FORCEP      HYSTERECTOMY, PAP NO LONGER INDICATED  10/1/2009    has ovaries    MYRINGOTOMY      as  a child       Family History   Problem Relation Age of Onset    Hyperlipidemia Mother             Mental Illness Mother     Mental Illness Sister         Bipolar    Diabetes Brother     Mental Illness Brother         Schizophrenic    Substance Abuse Brother     Mental  Illness Brother         Schizophrenic Shot himself    Substance Abuse Brother          under drug inducement    Cerebrovascular Disease Maternal Grandfather     Diabetes Paternal Grandmother         Type2    Mental Illness Paternal Grandmother     Diabetes Son         Type1    Mental Illness Son         Bipolar    Thyroid Disease Son     Bipolar Disorder Son     Coronary Artery Disease No family hx of     Cancer No family hx of        Social History     Socioeconomic History    Marital status:      Spouse name: Arturo     Number of children: 2    Years of education: 14    Highest education level: Not on file   Occupational History    Occupation: home health aide      Employer: [employer]   Tobacco Use    Smoking status: Never    Smokeless tobacco: Never    Tobacco comments:     smoke free household.   Vaping Use    Vaping Use: Never used   Substance and Sexual Activity    Alcohol use: Yes     Comment: 1-2 a month    Drug use: No    Sexual activity: Not Currently     Partners: Male     Birth control/protection: Female Surgical   Other Topics Concern    Parent/sibling w/ CABG, MI or angioplasty before 65F 55M? Not Asked   Social History Narrative    Not on file     Social Determinants of Health     Financial Resource Strain: Not on file   Food Insecurity: Not on file   Transportation Needs: Not on file   Physical Activity: Not on file   Stress: Not on file   Social Connections: Not on file   Interpersonal Safety: Not on file   Housing Stability: Not on file       Current Outpatient Medications   Medication Sig Dispense Refill    Aspirin-Acetaminophen-Caffeine (EXCEDRIN MIGRAINE PO) Take  by mouth as needed.      benzonatate (TESSALON) 200 MG capsule Take 1 capsule (200 mg) by mouth 3 times daily as needed for cough 30 capsule 0    citalopram (CELEXA) 20 MG tablet Take 20 mg by mouth 3 times daily      Digestive Enzymes (DIGESTIVE ENZYME PO) [facility] digestive enzymes      fluticasone (FLONASE) 50  "MCG/ACT nasal spray Spray 1 spray into both nostrils daily 16 g 0    gabapentin (NEURONTIN) 600 MG tablet Take 2 tablets (1200 mg) by mouth 3 times daily 180 tablet 11    guaiFENesin 1200 MG TB12 Take 1 tablet (1,200 mg) by mouth 2 times daily 60 tablet 0    lamoTRIgine (LAMICTAL) 100 MG tablet Take 100 mg by mouth 3 times daily.      metoclopramide (REGLAN) 5 MG tablet Take 1 tablet (5 mg) by mouth 2 times daily as needed (before largest meals) 120 tablet 0    pseudoePHEDrine (SUDAFED) 120 MG 12 hr tablet Take 1 tablet (120 mg) by mouth every 12 hours 15 tablet 0    Specialty Vitamins Products (BRAIN PO) [employer] dynamic brain supplement      Vitamin D, Cholecalciferol, 1000 units TABS Take 1,000 mg by mouth daily         Allergies   Allergen Reactions    Compazine      Anxiety         REVIEW OF SYSTEMS:  CONSTITUTIONAL:  NEGATIVE for fever, chills, change in weight, not feeling tired  SKIN:  NEGATIVE for worrisome rashes, no skin lumps, no skin ulcers and no non-healing wounds  EYES:  NEGATIVE for vision changes or irritation.  ENT/MOUTH:  NEGATIVE.  No hearing loss, no hoarseness, no difficulty swallowing.  RESP:  NEGATIVE. No cough or shortness of breath.  CV:  NEGATIVE for chest pain, palpitations or peripheral edema  GI:  NEGATIVE for nausea, abdominal pain, heartburn, or change in bowel habits  :  Negative. No dysuria, no hematuria  MUSCULOSKELETAL:  See HPI above  NEURO:  NEGATIVE . No headaches, no dizziness,  no numbness  ENDOCRINE:  NEGATIVE for temperature intolerance, skin/hair changes  HEME/ALLERGY/IMMUNE:  NEGATIVE for bleeding problems  PSYCHIATRIC:  NEGATIVE. no anxiety, no depression.     Exam:  Vitals: BP 92/65   Pulse 81   Resp 18   Ht 1.651 m (5' 5\")   Wt 67.1 kg (148 lb)   BMI 24.63 kg/m    BMI= Body mass index is 24.63 kg/m .  Constitutional:  healthy, alert and no distress  Neuro: Alert and Oriented x 3, no focal defects.  Psych: Affect normal   Respiratory: Breathing not " labored.  Cardiovascular: normal peripheral pulses  Lymph: no adenopathy  Skin: No rashes,worrisome lesions or skin problems  Left thumb CMC joint shows tenderness and mild prominence.  She has positive grind test of the thumb CMC joint.  She has a palmaris longus tendon present.  She also has triggering noted of the left thumb with tenderness at the A1 pulley.  I can feel the tendon nodule move.  Sensation, motor and circulation are intact.  Negative Tinel at the median nerve.    Assessment:  1. Left thumb carpometacarpal osteoarthritis.  2. Left trigger thumb.    Plan: We discussed surgical options for her, as she has failed ibuprofen use and steroid injections.  If we do trapezial excision with interposition arthroplasty and trigger thumb simultaneously, she may develop significant stiffness from the trigger thumb as it cannot be moved in the thumb spica splint for a month.  We discussed doing them individually.  If we do the CMC joint arthroplasty first, the thumb will have a month breast and may quiet down and not need surgery.  We have decided to proceed with this plan.  This will be done in same-day surgery with general anesthetic.  Left trapezial excision with palmaris longus interposition arthroplasty

## 2023-12-19 NOTE — TELEPHONE ENCOUNTER
Type of surgery: ARTHROPLASTY, CARPOMETACARPAL JOINT, THUMB, palmaris longus interposition arthroplasty   Location of surgery: [facility]  Date and time of surgery: 02/16/2024  Surgeon: JESS  Pre-Op Appt Date: 02/07/2024  Post-Op Appt Date: 03/04/2024   Packet sent out: Yes  Pre-cert/Authorization completed:  No  Date:

## 2024-01-07 ENCOUNTER — HEALTH MAINTENANCE LETTER (OUTPATIENT)
Age: 64
End: 2024-01-07

## 2024-01-11 ENCOUNTER — VIRTUAL VISIT (OUTPATIENT)
Dept: GASTROENTEROLOGY | Facility: CLINIC | Age: 64
End: 2024-01-11
Attending: STUDENT IN AN ORGANIZED HEALTH CARE EDUCATION/TRAINING PROGRAM
Payer: COMMERCIAL

## 2024-01-11 DIAGNOSIS — R11.10 REGURGITATION OF FOOD: Primary | ICD-10-CM

## 2024-01-11 DIAGNOSIS — R19.8 IRREGULAR BOWEL HABITS: ICD-10-CM

## 2024-01-11 DIAGNOSIS — K31.84 GASTROPARESIS: ICD-10-CM

## 2024-01-11 PROCEDURE — 99215 OFFICE O/P EST HI 40 MIN: CPT | Mod: 95 | Performed by: STUDENT IN AN ORGANIZED HEALTH CARE EDUCATION/TRAINING PROGRAM

## 2024-01-11 NOTE — LETTER
1/11/2024         RE: Lenore Martinez  [address]  [place] [address]        Dear Colleague,    Thank you for referring your patient, Lenore Martinez, to the [facility] [place]. Please see a copy of my visit note below.        GASTROENTEROLOGY Follow-up VIDEO VISIT      CC/REFERRING MD:    Sudhakar Ledezma    REASON FOR CONSULTATION:   Veronica La for   Chief Complaint   Patient presents with    RECHECK       HISTORY OF PRESENT ILLNESS:    Lenore Martinez is a 63 year old female who is being evaluated via a billable video visit for follow up    Symptoms have been ongoing for 2 years and was progressively worsening. She reported that if she ate more than 1 cup of food, or if she drinks a lot of liquids with meals, she will regurgitate mainly liquids, sometimes undigested foods. This seemed to be worse with soups, mashed potatoes and gravy, and worse if she did not separate food from drink. She has a history of constipation which had improved by the time of our last visit. She was having a bowel movement once every 2-3 days on average.      She is due for her next screening colonoscopy in 11/2023. She underwent gastric emptying study on 5/22/23 which showed delayed gastric emptying with 30% retention at 4 hours. She has an EGD scheduled for 9/7/23.      History 7/19/23:  Today, she reports she has been doing well. Her daughter is a health  interested in dietetics and has been helping her with her gastroparesis diet. She has not met with our GI dietitian (referral is placed) but will consider in the future. She has been limiting fatty foods, proteins, meats, and drinking fluids before eating rather than during or after which seems to help with regurgitation. She has not reduced her fiber intake since she finds this helpful to regulate her bowels. She will still have some effortless regurgitation after meals, mainly of liquids, and occasionally of food several  hours after eating. Worse if she bends forward after a meal. Sometimes she states food will come partially up her throat, causing a tickle or a cough. She denies dysphagia to solids or liquids. Reports very rare nausea, mainly only if she eats fructose. Denies vomiting or abdominal pain. Reports bowel movements are regular and not bothersome.      History 11/16/23:   EGD on 10/19/23 showed 1 cm hiatal hernia, a single gastric polyp, no specimens collected. The endoscopist recommended continuation of gastroparesis diet and consider G-POEM procedure if symptoms persist. The patient is interested in having the G-POEM procedure.      She reports that she has been continuing to follow her daughter's dietary recommendations who is a health . She tries to eat a low fat, low fiber diet. She eats small amounts and avoids drinking large amounts of liquids during meals. If she eats meat she will choose ground meat. She had 2 pieces of pizza the other day spaced out by several minutes and reports her stomach felt full the entire next day so she wasn't able to eat much the next day at all. She continues to have regurgitation of food particularly if she bends over after eating or drinks significant amounts of liquid with meals. She rarely has acid reflux or heartburn. Her weight has been steady. She watches what she eats since she is prediabetic. A1c has been normal at 5.6%.     Bowel movements are somewhat irregular. She has a history of constipation. She has a BSC4 stool every other or every 2 days. Sometimes this is followed by 3 BM in one day. Rarely has loose stools. She has tried miralax without much improvement in the past. She was taking 1/4 capful and increased to 1/2 capful at the time. She tried milk of mag which upset her stomach.     Interval History 1/11/24:   She reports today that she is doing really well. She took miralax daily for 2 months and feels that this has really helped. She feels a lot of her  symptoms were related to constipation. She was having small bowel movements every 2-3 days prior to using miralax. Stools became loose, so she ended up stopping miralax and is now having 1-2 larger bowel movements per day, formed. She notes a big improvement in postprandial fullness and bloating. She did start Reglan and took it for maybe 6 days but didn't feel well after taking it, so discontinued. It did seem to help a bit with bloating and distension. She feels this may have contributed to loose stools. Weight is stable, though she hasn't weighed at home in a while.    Her biggest issue is still that she has bland fluid coming up into the esophagus at times. Not having as much regurgitation of food. Still not having heartburn. She has found that by eating small meals, increasing fiber, she manages well. She is satisfied with how she is feeling now. She has watched a video on abdominal massage which has been helpful for stomach emptying and regular stools.    GCSI (Gastroparesis Cardinal Symptom Index):   Nausea: 1  Retchin  Vomiting 0  Stomach fullness: 2  Not able to finish a normal sized meal: 1  Feeling excessively full after meals: 1  Loss of appetite: 1  Bloatin  Stomach or belly visibly larger: 1   TOTAL (average of 3 subscores): 2.33     Wt Readings from Last 10 Encounters:   23 67.1 kg (148 lb)   23 67.1 kg (148 lb)   23 67.1 kg (148 lb)   10/25/23 67.6 kg (149 lb)   23 67.1 kg (148 lb)   23 67.1 kg (148 lb)   23 67.2 kg (148 lb 1.6 oz)   05/10/23 67.9 kg (149 lb 12.8 oz)   22 67.1 kg (148 lb)   10/25/22 67.1 kg (148 lb)        I have reviewed and updated the patient's Past Medical History, Social History, Family History and Medication List.    Exam:    General appearance:  Healthy appearing adult, in no acute distress  Eyes:  Sclera anicteric  Ears, nose, mouth and throat:  No obvious external lesions of ears and nose.  Hearing intact  Neck:  Symmetric,  No obvious external lesions  Respiratory:  Normal respiration, no use of accessory muscles   MSK:  No visual upper extremity, neck or facial muscle atrophy  Psychiatric:  Oriented to person, place and time, Appropriate mood and affect.   Neurologic:  Peripheral muscle function and dexterity appear to be intact      PERTINENT STUDIES have been reviewed.    ASSESSMENT/PLAN:    Lenore Martinez is a 63 year old female who presents for follow up of regurgitation, bloating    1. Gastroparesis  2. Regurgitation of food  3. Irregular Bowel Habits     2 years of regurgitation and early satiety. Symptoms have somewhat improved since initiating a gastroparesis diet. She has been using the advice of her daughter who is a health  with special interest in dietetics, so she has not met with our GI dietitian. She will consider this for the future. She continues a low fat diet with cooked vegetables and low in meats that are tough for her to digest. Eating smaller meals. She has reviewed the [facility] Diet for Gastroparesis handout.     After 2 months of miralax after our last appointment, bowel movements have gone from irregular to 1-2 times daily. She is feeling much better with more regular bowel movement sand has noted significant improvement in bloating, early satiety. She did try Reglan about 6 times, but didn't feel well each time after taking the medication so she has since discontinued.     She has had a positive NMGES with 30% retention at 4 hours in 5/2023. She does not have a history of diabetes or recent viral infection, autoimmune disease or neurological disease, and does not take medications known to cause gastroparesis. We reviewed how irregular bowel movements/constipation can contribute to gastroparesis. GCSI score today is 2.33.    Symptoms of regurgitation could be related to known hiatal hernia (EGD 10/2023 noted a 1 cm hiatal hernia), and made worse by gastroparesis and constipation. At this time,  she is happy with how she is feeling. She has stopped miralax due to regular 1-2x daily stools and improvement in symptoms.     -- Use miralax as needed for skipped days, increased straining by taking 1 capful for 3 days in a row   -- She will reach out if she would like a follow up appointment   -- Future considerations include consideration of hiatal hernia repair, alternative prokinetics such as prucalopride, or an 8 week trial of PPI, dietitian referral    44 minutes spent on the date of the encounter doing chart review, history and exam, documentation and further activities as noted above.      RTC PRN per patient preference            Again, thank you for allowing me to participate in the care of your patient.      Sincerely,    Veronica La PA-C

## 2024-01-11 NOTE — PATIENT INSTRUCTIONS
It was a pleasure taking care of you today.  I've included a brief summary of our discussion and care plan from today's visit below.  Please review this information with your primary care provider.  _______________________________________________________________________     My recommendations are summarized as follows:   -- Use miralax as needed if you begin to have harder stools, straining to have a bowel movement, or skip days without a bowel movement again   -- Reach out if you feel a follow up is needed at any point   -- Future considerations could include an alternative gastric motility medication, an 8 week course of an acid reflux medication    _____________________________________________________________________     How do I schedule labs, imaging studies, or procedures that were ordered in clinic today?      Labs: To schedule lab appointment you can contact your local [facility] or call [phone] to schedule at any convenient [facility] location.      Procedures: If a colonoscopy, upper endoscopy, breath test, esophageal manometry, or pH impedence was ordered today, our endoscopy team will call you to schedule this. If you have not heard from our endoscopy team within a week, please call [phone] to schedule.      Imaging Studies: If you were scheduled for a CT scan, X-ray, MRI, ultrasound, HIDA scan or other imaging study, please call [phone] to have this scheduled.      Referral: If a referral to another specialty was ordered, expect a phone call or follow instructions above. If you have not heard from anyone regarding your referral in a week, please call our clinic to check the status.      Who do I call with any questions after my visit?  Please be in touch if there are any further questions that arise following today's visit.  There are multiple ways to contact your gastroenterology care team.       During business hours, you may reach a Gastroenterology nurse at  [phone]     To schedule or reschedule an appointment, please call [phone].      You can always send a secure message through [employer].  [employer] messages are answered by your nurse or doctor typically within 24 hours.  Please allow extra time on weekends and holidays.       For urgent/emergent questions after business hours, you may reach the on-call GI Fellow by contacting the [facility]  at [phone].     How will I get the results of any tests ordered?    You will receive all of your results.  If you have signed up for [employer], any tests ordered at your visit will be available to you after your physician reviews them.  Typically this takes 1-2 weeks.  If there are urgent results that require a change in your care plan, your physician or nurse will call you to discuss the next steps.       What is [employer]?  [employer] is a secure way for you to access all of your healthcare records from the [facility].  It is a web based computer program, so you can sign on to it from any location.  It also allows you to send secure messages to your care team.  I recommend signing up for [employer] access if you have not already done so and are comfortable with using a computer.       How to I schedule a follow-up visit?  If you did not schedule a follow-up visit today, please call [phone] to schedule a follow-up office visit.

## 2024-01-11 NOTE — NURSING NOTE
Is the patient currently in the state of [place]? YES    Visit mode:VIDEO    If the visit is dropped, the patient can be reconnected by: VIDEO VISIT: Text to cell phone:   Telephone Information:   Mobile [phone]       Will anyone else be joining the visit? NO  (If patient encounters technical issues they should call [phone])    How would you like to obtain your AVS? MyChart    Are changes needed to the allergy or medication list? No    Reason for visit: RECHECK    Vaughn KRISHNAMURTHY

## 2024-01-11 NOTE — PROGRESS NOTES
GASTROENTEROLOGY Follow-up VIDEO VISIT    Virtual Visit Details    Type of service:  Video Visit   Originating Location (pt. Location): Home    Distant Location (provider location):  Off-site  Platform used for Video Visit: Logan BERG/REFERRING MD:    Sudhakar Ledezma    REASON FOR CONSULTATION:   Veronica La for   Chief Complaint   Patient presents with    RECHECK       HISTORY OF PRESENT ILLNESS:    Lenore Martinez is a 63 year old female who is being evaluated via a billable video visit for follow up    Symptoms have been ongoing for 2 years and was progressively worsening. She reported that if she ate more than 1 cup of food, or if she drinks a lot of liquids with meals, she will regurgitate mainly liquids, sometimes undigested foods. This seemed to be worse with soups, mashed potatoes and gravy, and worse if she did not separate food from drink. She has a history of constipation which had improved by the time of our last visit. She was having a bowel movement once every 2-3 days on average.      She is due for her next screening colonoscopy in 11/2023. She underwent gastric emptying study on 5/22/23 which showed delayed gastric emptying with 30% retention at 4 hours. She has an EGD scheduled for 9/7/23.      History 7/19/23:  Today, she reports she has been doing well. Her daughter is a health  interested in dietetics and has been helping her with her gastroparesis diet. She has not met with our GI dietitian (referral is placed) but will consider in the future. She has been limiting fatty foods, proteins, meats, and drinking fluids before eating rather than during or after which seems to help with regurgitation. She has not reduced her fiber intake since she finds this helpful to regulate her bowels. She will still have some effortless regurgitation after meals, mainly of liquids, and occasionally of food several hours after eating. Worse if she bends forward after a meal. Sometimes she  states food will come partially up her throat, causing a tickle or a cough. She denies dysphagia to solids or liquids. Reports very rare nausea, mainly only if she eats fructose. Denies vomiting or abdominal pain. Reports bowel movements are regular and not bothersome.      History 11/16/23:   EGD on 10/19/23 showed 1 cm hiatal hernia, a single gastric polyp, no specimens collected. The endoscopist recommended continuation of gastroparesis diet and consider G-POEM procedure if symptoms persist. The patient is interested in having the G-POEM procedure.      She reports that she has been continuing to follow her daughter's dietary recommendations who is a health . She tries to eat a low fat, low fiber diet. She eats small amounts and avoids drinking large amounts of liquids during meals. If she eats meat she will choose ground meat. She had 2 pieces of pizza the other day spaced out by several minutes and reports her stomach felt full the entire next day so she wasn't able to eat much the next day at all. She continues to have regurgitation of food particularly if she bends over after eating or drinks significant amounts of liquid with meals. She rarely has acid reflux or heartburn. Her weight has been steady. She watches what she eats since she is prediabetic. A1c has been normal at 5.6%.     Bowel movements are somewhat irregular. She has a history of constipation. She has a BSC4 stool every other or every 2 days. Sometimes this is followed by 3 BM in one day. Rarely has loose stools. She has tried miralax without much improvement in the past. She was taking 1/4 capful and increased to 1/2 capful at the time. She tried milk of mag which upset her stomach.     Interval History 1/11/24:   She reports today that she is doing really well. She took miralax daily for 2 months and feels that this has really helped. She feels a lot of her symptoms were related to constipation. She was having small bowel movements every  2-3 days prior to using miralax. Stools became loose, so she ended up stopping miralax and is now having 1-2 larger bowel movements per day, formed. She notes a big improvement in postprandial fullness and bloating. She did start Reglan and took it for maybe 6 days but didn't feel well after taking it, so discontinued. It did seem to help a bit with bloating and distension. She feels this may have contributed to loose stools. Weight is stable, though she hasn't weighed at home in a while.    Her biggest issue is still that she has bland fluid coming up into the esophagus at times. Not having as much regurgitation of food. Still not having heartburn. She has found that by eating small meals, increasing fiber, she manages well. She is satisfied with how she is feeling now. She has watched a video on abdominal massage which has been helpful for stomach emptying and regular stools.    GCSI (Gastroparesis Cardinal Symptom Index):   Nausea: 1  Retchin  Vomiting 0  Stomach fullness: 2  Not able to finish a normal sized meal: 1  Feeling excessively full after meals: 1  Loss of appetite: 1  Bloatin  Stomach or belly visibly larger: 1   TOTAL (average of 3 subscores): 2.33     Wt Readings from Last 10 Encounters:   23 67.1 kg (148 lb)   23 67.1 kg (148 lb)   23 67.1 kg (148 lb)   10/25/23 67.6 kg (149 lb)   23 67.1 kg (148 lb)   23 67.1 kg (148 lb)   23 67.2 kg (148 lb 1.6 oz)   05/10/23 67.9 kg (149 lb 12.8 oz)   22 67.1 kg (148 lb)   10/25/22 67.1 kg (148 lb)        I have reviewed and updated the patient's Past Medical History, Social History, Family History and Medication List.    Exam:    General appearance:  Healthy appearing adult, in no acute distress  Eyes:  Sclera anicteric  Ears, nose, mouth and throat:  No obvious external lesions of ears and nose.  Hearing intact  Neck:  Symmetric, No obvious external lesions  Respiratory:  Normal respiration, no use of accessory  muscles   MSK:  No visual upper extremity, neck or facial muscle atrophy  Psychiatric:  Oriented to person, place and time, Appropriate mood and affect.   Neurologic:  Peripheral muscle function and dexterity appear to be intact      PERTINENT STUDIES have been reviewed.    ASSESSMENT/PLAN:    Lenore Martinez is a 63 year old female who presents for follow up of regurgitation, bloating    1. Gastroparesis  2. Regurgitation of food  3. Irregular Bowel Habits     2 years of regurgitation and early satiety. Symptoms have somewhat improved since initiating a gastroparesis diet. She has been using the advice of her daughter who is a health  with special interest in dietetics, so she has not met with our GI dietitian. She will consider this for the future. She continues a low fat diet with cooked vegetables and low in meats that are tough for her to digest. Eating smaller meals. She has reviewed the [facility] Diet for Gastroparesis handout.     After 2 months of miralax after our last appointment, bowel movements have gone from irregular to 1-2 times daily. She is feeling much better with more regular bowel movement sand has noted significant improvement in bloating, early satiety. She did try Reglan about 6 times, but didn't feel well each time after taking the medication so she has since discontinued.     She has had a positive NMGES with 30% retention at 4 hours in 5/2023. She does not have a history of diabetes or recent viral infection, autoimmune disease or neurological disease, and does not take medications known to cause gastroparesis. We reviewed how irregular bowel movements/constipation can contribute to gastroparesis. GCSI score today is 2.33.    Symptoms of regurgitation could be related to known hiatal hernia (EGD 10/2023 noted a 1 cm hiatal hernia), and made worse by gastroparesis and constipation. At this time, she is happy with how she is feeling. She has stopped miralax due to regular 1-2x  daily stools and improvement in symptoms.     -- Use miralax as needed for skipped days, increased straining by taking 1 capful for 3 days in a row   -- She will reach out if she would like a follow up appointment   -- Future considerations include consideration of hiatal hernia repair, alternative prokinetics such as prucalopride, or an 8 week trial of PPI, dietitian referral      Video-Visit Details  Video Visit Time: 18 minutes  Type of service:  Video Visit  Originating Location (pt. Location): Home        Distant Location (provider location):  Off-site  Platform used for Video Visit: [employer]    44 minutes spent on the date of the encounter doing chart review, history and exam, documentation and further activities as noted above.    Veronica La PA-C  Division of Gastroenterology, Hepatology, and Nutrition  [facility]       RTC PRN per patient preference

## 2024-01-13 ENCOUNTER — APPOINTMENT (OUTPATIENT)
Dept: GENERAL RADIOLOGY | Facility: CLINIC | Age: 64
End: 2024-01-13
Attending: EMERGENCY MEDICINE
Payer: OTHER MISCELLANEOUS

## 2024-01-13 ENCOUNTER — NURSE TRIAGE (OUTPATIENT)
Dept: NURSING | Facility: CLINIC | Age: 64
End: 2024-01-13
Payer: COMMERCIAL

## 2024-01-13 ENCOUNTER — APPOINTMENT (OUTPATIENT)
Dept: CT IMAGING | Facility: CLINIC | Age: 64
End: 2024-01-13
Attending: EMERGENCY MEDICINE
Payer: OTHER MISCELLANEOUS

## 2024-01-13 ENCOUNTER — HOSPITAL ENCOUNTER (EMERGENCY)
Facility: CLINIC | Age: 64
Discharge: HOME | End: 2024-01-13
Attending: EMERGENCY MEDICINE | Admitting: EMERGENCY MEDICINE
Payer: OTHER MISCELLANEOUS

## 2024-01-13 VITALS
RESPIRATION RATE: 16 BRPM | DIASTOLIC BLOOD PRESSURE: 78 MMHG | BODY MASS INDEX: 24.66 KG/M2 | TEMPERATURE: 97.7 F | WEIGHT: 148 LBS | HEIGHT: 65 IN | HEART RATE: 70 BPM | OXYGEN SATURATION: 98 % | SYSTOLIC BLOOD PRESSURE: 129 MMHG

## 2024-01-13 DIAGNOSIS — S00.83XA CONTUSION OF CHEEK: Primary | ICD-10-CM

## 2024-01-13 DIAGNOSIS — M25.532 LEFT WRIST PAIN: ICD-10-CM

## 2024-01-13 PROCEDURE — 29125 APPL SHORT ARM SPLINT STATIC: CPT | Mod: LT

## 2024-01-13 PROCEDURE — 99284 EMERGENCY DEPT VISIT MOD MDM: CPT | Mod: GC | Performed by: EMERGENCY MEDICINE

## 2024-01-13 PROCEDURE — 70486 CT MAXILLOFACIAL W/O DYE: CPT | Mod: 26 | Performed by: RADIOLOGY

## 2024-01-13 PROCEDURE — 70486 CT MAXILLOFACIAL W/O DYE: CPT

## 2024-01-13 PROCEDURE — 99284 EMERGENCY DEPT VISIT MOD MDM: CPT | Mod: 25

## 2024-01-13 PROCEDURE — 73110 X-RAY EXAM OF WRIST: CPT | Mod: 26 | Performed by: RADIOLOGY

## 2024-01-13 PROCEDURE — 73110 X-RAY EXAM OF WRIST: CPT | Mod: LT

## 2024-01-13 RX ORDER — IBUPROFEN 600 MG/1
600 TABLET, FILM COATED ORAL ONCE
Status: DISCONTINUED | OUTPATIENT
Start: 2024-01-13 | End: 2024-01-13 | Stop reason: HOSPADM

## 2024-01-13 ASSESSMENT — ACTIVITIES OF DAILY LIVING (ADL): ADLS_ACUITY_SCORE: 35

## 2024-01-13 ASSESSMENT — VISUAL ACUITY: OU: 1
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
## 2024-01-14 NOTE — DISCHARGE INSTRUCTIONS
Please wear your thumb spica splint at all times for 1 week.  Follow-up with your primary care doctor and have a repeat x-ray, if no fracture at that time then no need to continue wearing the splint.    Please return for any visual concerns or complaints or changes.    Take 1000 mg of Tylenol and 600 mg of ibuprofen every 6 hours for pain control.

## 2024-01-15 ENCOUNTER — TELEPHONE (OUTPATIENT)
Dept: OPHTHALMOLOGY | Facility: CLINIC | Age: 64
End: 2024-01-15
Payer: COMMERCIAL

## 2024-01-15 NOTE — TELEPHONE ENCOUNTER
Called and spoke to Lenore     She declined an appointment after discussing billing / insurance     She is going to see her primary eye care provider     Nathaly Morales Communication Facilitator on 1/15/2024 at 12:42 PM

## 2024-01-16 ENCOUNTER — TELEPHONE (OUTPATIENT)
Dept: FAMILY MEDICINE | Facility: CLINIC | Age: 64
End: 2024-01-16
Payer: COMMERCIAL

## 2024-01-16 NOTE — TELEPHONE ENCOUNTER
Hospital follow up    Patient in ED 1/13/24     Diagnosis   Left wrist pain.    Recommended follow-up    Please wear your thumb spica splint at all times for 1 week. Follow-up  with your primary care doctor and have a repeat x-ray, if no fracture at  that time then no need to continue wearing the splint.    Christine M Klisch, RN

## 2024-01-16 NOTE — TELEPHONE ENCOUNTER
"Thumb spica splint provided with instructions to wear 27/7 except while bathing  If pain persists, recommend reevaluation in 12-21 days with primary care provider.      ED/Discharge Protocol    \"Hi, my name is Carmen Hairston RN, a registered nurse, and I am calling on behalf of Dr. Ledezma's office at [place].  I am calling to follow up and see how things are going for you after your recent visit.\"    \"I see that you were in the (ER/UC/IP) on 1/13.    How are you doing now that you are home?\" Doing ok- some discomfort in face    Is patient experiencing symptoms that may require a hospital visit?  no    Discharge Instructions    \"Let's review your discharge instructions.  What is/are the follow-up recommendations?  Pt. Response: follow up in a week    \"Were you instructed to make a follow-up appointment?\"  Pt. Response: Yes.  Has appointment been made?   Yes      \"When you see the provider, I would recommend that you bring your discharge instructions with you.    Medications    \"How many new medications are you on since your hospitalization/ED visit?\"    0-1  \"How many of your current medicines changed (dose, timing, name, etc.) while you were in the hospital/ED visit?\"   0-1  \"Do you have questions about your medications?\"   No  \"Were you newly diagnosed with heart failure, COPD, diabetes or did you have a heart attack?\"   No  For patients on insulin: \"Did you start on insulin in the hospital or did you have your insulin dose changed?\"   No  Post Discharge Medication Reconciliation Status: discharge medications reconciled, continue medications without change.    Was MTM referral placed (*Make sure to put transitions as reason for referral)?   No    Call Summary    \"Do you have any questions or concerns about your condition or care plan at the moment?\"    No  Triage nurse advice given: n/a    Patient was in ER 2 times in the past year (assess appropriateness of ER visits.)      \"If you have questions or things " "don't continue to improve, we encourage you contact us through the main clinic number,  [phone].  Even if the clinic is not open, triage nurses are available 24/7 to help you.     We would like you to know that our clinic has extended hours (provide information).  We also have urgent care (provide details on closest location and hours/contact info)\"      \"Thank you for your time and take care!\"     "

## 2024-01-26 PROBLEM — M18.12 PRIMARY OSTEOARTHRITIS OF FIRST CARPOMETACARPAL JOINT OF LEFT HAND: Status: ACTIVE | Noted: 2023-12-18

## 2024-01-31 ENCOUNTER — DOCUMENTATION ONLY (OUTPATIENT)
Dept: FAMILY MEDICINE | Facility: CLINIC | Age: 64
End: 2024-01-31

## 2024-01-31 NOTE — PROGRESS NOTES
This patient has a future lab only appointment and needs orders. Please send orders. Thanks [facility]

## 2024-02-02 ENCOUNTER — LAB (OUTPATIENT)
Dept: LAB | Facility: CLINIC | Age: 64
End: 2024-02-02
Payer: COMMERCIAL

## 2024-02-02 ENCOUNTER — DOCUMENTATION ONLY (OUTPATIENT)
Dept: FAMILY MEDICINE | Facility: CLINIC | Age: 64
End: 2024-02-02

## 2024-02-02 DIAGNOSIS — F43.10 POSTTRAUMATIC STRESS DISORDER: Primary | ICD-10-CM

## 2024-02-02 PROCEDURE — 80175 DRUG SCREEN QUAN LAMOTRIGINE: CPT | Mod: 90

## 2024-02-02 PROCEDURE — 99000 SPECIMEN HANDLING OFFICE-LAB: CPT

## 2024-02-02 PROCEDURE — 36415 COLL VENOUS BLD VENIPUNCTURE: CPT

## 2024-02-02 NOTE — PROGRESS NOTES
Lenore Martinez has an upcoming lab appointment:    Future Appointments   Date Time Provider Department Center   2/2/2024  1:30 PM [facility]   2/7/2024 10:00 AM Sudhakar Ledezma APRN CNP [facility]   3/4/2024  8:00 AM Nasim Nice MD [facility]   10/25/2024 12:00 PM Kedar Conn MD [facility]     Patient is scheduled for the following lab(s): lipid A1c and microalbumin    There is no order available. Please review and place either future orders or HMPO (Review of Health Maintenance Protocol Orders), as appropriate.    Health Maintenance Due   Topic    ANNUAL REVIEW OF HM ORDERS     A1C     LIPID     MICROALBUMIN      Mary Montenegro

## 2024-02-04 LAB — LAMOTRIGINE SERPL-MCNC: 9.2 UG/ML

## 2024-02-07 ENCOUNTER — OFFICE VISIT (OUTPATIENT)
Dept: FAMILY MEDICINE | Facility: CLINIC | Age: 64
End: 2024-02-07
Payer: COMMERCIAL

## 2024-02-07 VITALS
SYSTOLIC BLOOD PRESSURE: 92 MMHG | RESPIRATION RATE: 18 BRPM | OXYGEN SATURATION: 97 % | TEMPERATURE: 98.5 F | WEIGHT: 140.2 LBS | BODY MASS INDEX: 23.36 KG/M2 | HEART RATE: 74 BPM | DIASTOLIC BLOOD PRESSURE: 57 MMHG | HEIGHT: 65 IN

## 2024-02-07 DIAGNOSIS — F43.10 PTSD (POST-TRAUMATIC STRESS DISORDER): ICD-10-CM

## 2024-02-07 DIAGNOSIS — E13.41 DIABETIC MONONEUROPATHY ASSOCIATED WITH OTHER SPECIFIED DIABETES MELLITUS (H): ICD-10-CM

## 2024-02-07 DIAGNOSIS — M18.12 PRIMARY OSTEOARTHRITIS OF FIRST CARPOMETACARPAL JOINT OF LEFT HAND: ICD-10-CM

## 2024-02-07 DIAGNOSIS — F33.1 MODERATE EPISODE OF RECURRENT MAJOR DEPRESSIVE DISORDER (H): ICD-10-CM

## 2024-02-07 DIAGNOSIS — Z01.818 PREOP GENERAL PHYSICAL EXAM: Primary | ICD-10-CM

## 2024-02-07 PROCEDURE — 99214 OFFICE O/P EST MOD 30 MIN: CPT

## 2024-02-07 ASSESSMENT — PATIENT HEALTH QUESTIONNAIRE - PHQ9
SUM OF ALL RESPONSES TO PHQ QUESTIONS 1-9: 2
10. IF YOU CHECKED OFF ANY PROBLEMS, HOW DIFFICULT HAVE THESE PROBLEMS MADE IT FOR YOU TO DO YOUR WORK, TAKE CARE OF THINGS AT HOME, OR GET ALONG WITH OTHER PEOPLE: NOT DIFFICULT AT ALL
SUM OF ALL RESPONSES TO PHQ QUESTIONS 1-9: 2

## 2024-02-07 NOTE — PATIENT INSTRUCTIONS
Preparing for Your Surgery  Getting started  A nurse will call you to review your health history and instructions. They will give you an arrival time based on your scheduled surgery time. Please be ready to share:  Your doctor's clinic name and phone number  Your medical, surgical, and anesthesia history  A list of allergies and sensitivities  A list of medicines, including herbal treatments and over-the-counter drugs  Whether the patient has a legal guardian (ask how to send us the papers in advance)  Please tell us if you're pregnant--or if there's any chance you might be pregnant. Some surgeries may injure a fetus (unborn baby), so they require a pregnancy test. Surgeries that are safe for a fetus don't always need a test, and you can choose whether to have one.   If you have a child who's having surgery, please ask for a copy of Preparing for Your Child's Surgery.    Preparing for surgery  Within 10 to 30 days of surgery: Have a pre-op exam (sometimes called an H&P, or History and Physical). This can be done at a clinic or pre-operative center.  If you're having a , you may not need this exam. Talk to your care team.  At your pre-op exam, talk to your care team about all medicines you take. If you need to stop any medicines before surgery, ask when to start taking them again.  We do this for your safety. Many medicines can make you bleed too much during surgery. Some change how well surgery (anesthesia) drugs work.  Call your insurance company to let them know you're having surgery. (If you don't have insurance, call [phone].)  Call your clinic if there's any change in your health. This includes signs of a cold or flu (sore throat, runny nose, cough, rash, fever). It also includes a scrape or scratch near the surgery site.  If you have questions on the day of surgery, call your hospital or surgery center.  Eating and drinking guidelines  For your safety: Unless your surgeon tells you otherwise,  follow the guidelines below.  Eat and drink as usual until 8 hours before you arrive for surgery. After that, no food or milk.  Drink clear liquids until 2 hours before you arrive. These are liquids you can see through, like water, Gatorade, and Propel Water. They also include plain black coffee and tea (no cream or milk), candy, and breath mints. You can spit out gum when you arrive.  If you drink alcohol: Stop drinking it the night before surgery.  If your care team tells you to take medicine on the morning of surgery, it's okay to take it with a sip of water.  Preventing infection  Shower or bathe the night before and morning of your surgery. Follow the instructions your clinic gave you. (If no instructions, use regular soap.)  Don't shave or clip hair near your surgery site. We'll remove the hair if needed.  Don't smoke or vape the morning of surgery. You may chew nicotine gum up to 2 hours before surgery. A nicotine patch is okay.  Note: Some surgeries require you to completely quit smoking and nicotine. Check with your surgeon.  Your care team will make every effort to keep you safe from infection. We will:  Clean our hands often with soap and water (or an alcohol-based hand rub).  Clean the skin at your surgery site with a special soap that kills germs.  Give you a special gown to keep you warm. (Cold raises the risk of infection.)  Wear special hair covers, masks, gowns and gloves during surgery.  Give antibiotic medicine, if prescribed. Not all surgeries need antibiotics.  What to bring on the day of surgery  Photo ID and insurance card  Copy of your health care directive, if you have one  Glasses and hearing aids (bring cases)  You can't wear contacts during surgery  Inhaler and eye drops, if you use them (tell us about these when you arrive)  CPAP machine or breathing device, if you use them  A few personal items, if spending the night  If you have . . .  A pacemaker, ICD (cardiac defibrillator) or other  implant: Bring the ID card.  An implanted stimulator: Bring the remote control.  A legal guardian: Bring a copy of the certified (court-stamped) guardianship papers.  Please remove any jewelry, including body piercings. Leave jewelry and other valuables at home.  If you're going home the day of surgery  You must have a responsible adult drive you home. They should stay with you overnight as well.  If you don't have someone to stay with you, and you aren't safe to go home alone, we may keep you overnight. Insurance often won't pay for this.  After surgery  If it's hard to control your pain or you need more pain medicine, please call your surgeon's office.  Questions?   If you have any questions for your care team, list them here: _________________________________________________________________________________________________________________________________________________________________________ ____________________________________ ____________________________________ ____________________________________  For informational purposes only. Not to replace the advice of your health care provider. Copyright   2003, 2019 [employer]. All rights reserved. Clinically reviewed by Fay Medina MD. [employer] 199205 - REV 12/22.    How to Take Your Medication Before Surgery  - HOLD (do not take) Aspirin (Excedrin) for 7 days  - Tylenol (aka acetaminophen) is ok without modification  - Lamictal: Continue without modification.  - gabapentin: Continue without modification.  - ibuprofen (Advil, Motrin): HOLD 1 day before surgery.   - naproxen (Aleve, Naprosyn): HOLD 4 days before surgery.   - Celexa: Continue without modification.   - Herbal medications and vitamins: HOLD 14 days prior to surgery.

## 2024-02-07 NOTE — PROGRESS NOTES
Preoperative Evaluation  [address] [address]  Phone: [phone]  Primary Provider: Penelope Jimenez  Pre-op Performing Provider: PENELOPE JIMENEZ  Feb 7, 2024       Lenore is a 63 year old, presenting for the following:  Pre-Op Exam        2/7/2024     9:48 AM   Additional Questions   Roomed by jarret nava ma   Accompanied by self         2/7/2024     9:48 AM   Patient Reported Additional Medications   Patient reports taking the following new medications none     Surgical Information  Surgery/Procedure: ARTHROPLASTY, CARPOMETACARPAL JOINT, THUMB, palmaris longus interposition arthroplasty   Surgery Location: [place]  Surgeon: Arlet  Surgery Date: 2/16/24  Time of Surgery: 7:30 AM  Where patient plans to recover: At home with family  Fax number for surgical facility: Note does not need to be faxed, will be available electronically in Epic.    Assessment & Plan     The proposed surgical procedure is considered LOW risk.    Preop general physical exam    Primary osteoarthritis of first carpometacarpal joint of left hand  Chronic, upcoming surgery.     Moderate episode of recurrent major depressive disorder (H)  Chronic, stable on citalopram. Continue without changes.     Diabetic mononeuropathy associated with other specified diabetes mellitus (H)  Chronic, stable on gabapentin, continue without changes.     PTSD (post-traumatic stress disorder)  Chronic, stable on lamotrigine, continue without changes.            Risks and Recommendations  The patient has the following additional risks and recommendations for perioperative complications:   - No identified additional risk factors other than previously addressed    Antiplatelet or Anticoagulation Medication Instructions   - Patient is on no antiplatelet or anticoagulation medications.    Additional Medication Instructions   - Antiepileptics: Continue without modification.   - pregabalin, gabapentin:  Continue without modification.   - ibuprofen (Advil, Motrin): HOLD 1 day before surgery.    - naproxen (Aleve, Naprosyn): HOLD 4 days before surgery.    - SSRIs, SNRIs, TCAs, Antipsychotics: Continue without modification.    - Herbal medications and vitamins: HOLD 14 days prior to surgery.    Recommendation  APPROVAL GIVEN to proceed with proposed procedure, without further diagnostic evaluation.    Prescription drug management      Subjective       HPI related to upcoming procedure: Arthritis, injury in left thumb years ago. Tried hand therapy, cortisone shots, bracing, now planning surgery with hand specialist.           2/7/2024     9:42 AM   Preop Questions   1. Have you ever had a heart attack or stroke? No   2. Have you ever had surgery on your heart or blood vessels, such as a stent placement, a coronary artery bypass, or surgery on an artery in your head, neck, heart, or legs? No   3. Do you have chest pain with activity? No   4. Do you have a history of  heart failure? No   5. Do you currently have a cold, bronchitis or symptoms of other infection? No   6. Do you have a cough, shortness of breath, or wheezing? No   7. Do you or anyone in your family have previous history of blood clots? No   8. Do you or does anyone in your family have a serious bleeding problem such as prolonged bleeding following surgeries or cuts? No   9. Have you ever had problems with anemia or been told to take iron pills? YES - anemia when she was younger but has not had problems for a while now. Not on iron now. Resolved after menopause.   10. Have you had any abnormal blood loss such as black, tarry or bloody stools, or abnormal vaginal bleeding? No   11. Have you ever had a blood transfusion? No   12. Are you willing to have a blood transfusion if it is medically needed before, during, or after your surgery? NO   13. Have you or any of your relatives ever had problems with anesthesia? No   14. Do you have sleep apnea, excessive  snoring or daytime drowsiness? No   15. Do you have any artifical heart valves or other implanted medical devices like a pacemaker, defibrillator, or continuous glucose monitor? No   16. Do you have artificial joints? No   17. Are you allergic to latex? No     Health Care Directive  Patient does not have a Health Care Directive or Living Will:     Preoperative Review of    reviewed - controlled substances reflected in medication list.      Status of Chronic Conditions:  DEPRESSION/MENTAL HEALTH - Patient has a long history of Depression of moderate severity requiring medication for control with recent symptoms being unchanged..Current symptoms of depression include none - working with therapy.    DIABETES - Patient has a longstanding history of PRE-diabetes with neuropathy. Patient is being treated with none, diet, and exercise and denies significant side effects. Control has been good. Complicating factors include but are not limited to: neuropathy.       Patient Active Problem List    Diagnosis Date Noted    Trigger thumb of left hand 12/18/2023     Priority: Medium    Primary osteoarthritis of first carpometacarpal joint of left hand 12/18/2023     Priority: Medium    Hyperlipidemia LDL goal <70 04/25/2022     Priority: Medium    Diabetic neuropathy (H) 06/03/2021     Priority: Medium    Migraine with aura       Priority: Medium    Bipolar 2 disorder (H)      Priority: Medium    Hypovitaminosis D 05/12/2014     Priority: Medium    PTSD (post-traumatic stress disorder) 04/16/2013     Priority: Medium     Diagnosed 11/2005      Major depression 10/25/1990     Priority: Medium      Past Medical History:   Diagnosis Date    Anxiety     Bipolar disease, chronic (H)     Diabetic neuropathy (H) 06/03/2021    Hyperlipidemia LDL goal <70     Hypovitaminosis D 05/12/2014    Iron deficiency     Migraines     Neuropathy     Primary osteoarthritis of first carpometacarpal joint of left hand 12/18/2023    PTSD  (post-traumatic stress disorder) 2013    Diagnosed 2005     Past Surgical History:   Procedure Laterality Date    COLONOSCOPY N/A 2022    Procedure: COLONOSCOPY, WITH POLYPECTOMY With Clips;  Surgeon: Darien Norris MD;  Location: [facility] OR    ESOPHAGOSCOPY, GASTROSCOPY, DUODENOSCOPY (EGD), COMBINED N/A 10/19/2023    Procedure: Esophagoscopy, gastroscopy, duodenoscopy (EGD), combined;  Surgeon: Cira Chiu MD;  Location: [facility]    HC REMOVAL OF OVARIAN CYST(S)      HC TOOTH EXTRACTION W/FORCEP      HYSTERECTOMY, PAP NO LONGER INDICATED  10/1/2009    has ovaries    MYRINGOTOMY      as  a child     Current Outpatient Medications   Medication Sig Dispense Refill    Aspirin-Acetaminophen-Caffeine (EXCEDRIN MIGRAINE PO) Take  by mouth as needed.      citalopram (CELEXA) 20 MG tablet Take 20 mg by mouth 3 times daily      Digestive Enzymes (DIGESTIVE ENZYME PO) [employer] digestive enzymes      gabapentin (NEURONTIN) 600 MG tablet Take 2 tablets (1200 mg) by mouth 3 times daily 180 tablet 11    lamoTRIgine (LAMICTAL) 100 MG tablet Take 100 mg by mouth 3 times daily.      Specialty Vitamins Products (BRAIN PO) [employer] dynamic brain supplement      Vitamin D, Cholecalciferol, 1000 units TABS Take 1,000 mg by mouth daily         Allergies   Allergen Reactions    Compazine      Anxiety          Social History     Tobacco Use    Smoking status: Never    Smokeless tobacco: Never    Tobacco comments:     smoke free household.   Substance Use Topics    Alcohol use: Yes     Comment: 1-2 a month     Family History   Problem Relation Age of Onset    Hyperlipidemia Mother             Mental Illness Mother     Mental Illness Sister         Bipolar    Diabetes Brother     Mental Illness Brother         Schizophrenic    Substance Abuse Brother     Mental Illness Brother         Schizophrenic Shot himself    Substance Abuse Brother          under drug inducement     "Cerebrovascular Disease Maternal Grandfather     Diabetes Paternal Grandmother         Type2    Mental Illness Paternal Grandmother     Diabetes Son         Type1    Mental Illness Son         Bipolar    Thyroid Disease Son     Bipolar Disorder Son     Coronary Artery Disease No family hx of     Cancer No family hx of      History   Drug Use No         Review of Systems    Review of Systems  CONSTITUTIONAL: NEGATIVE for fever, chills, change in weight  INTEGUMENTARY/SKIN: NEGATIVE for worrisome rashes, moles or lesions  EYES: NEGATIVE for vision changes or irritation  ENT/MOUTH: NEGATIVE for ear, mouth and throat problems  RESP: NEGATIVE for significant cough or SOB  CV: NEGATIVE for chest pain, palpitations or peripheral edema  GI: NEGATIVE for nausea, abdominal pain, heartburn, or change in bowel habits  : NEGATIVE for frequency, dysuria, or hematuria  MUSCULOSKELETAL: NEGATIVE for significant arthralgias or myalgia  NEURO: NEGATIVE for weakness, dizziness or paresthesias  ENDOCRINE: NEGATIVE for temperature intolerance, skin/hair changes  HEME: NEGATIVE for bleeding problems  PSYCHIATRIC: NEGATIVE for changes in mood or affect  Objective    BP 92/57 (BP Location: Right arm, Patient Position: Chair, Cuff Size: Adult Regular)   Pulse 74   Temp 98.5  F (36.9  C) (Oral)   Resp 18   Ht 1.651 m (5' 5\")   Wt 63.6 kg (140 lb 3.2 oz)   SpO2 97%   BMI 23.33 kg/m     Estimated body mass index is 23.33 kg/m  as calculated from the following:    Height as of this encounter: 1.651 m (5' 5\").    Weight as of this encounter: 63.6 kg (140 lb 3.2 oz).  Physical Exam  GENERAL: alert and no distress  EYES: Eyes grossly normal to inspection, PERRL and conjunctivae and sclerae normal  NECK: no adenopathy, no asymmetry, masses, or scars  RESP: lungs clear to auscultation - no rales, rhonchi or wheezes  CV: regular rate and rhythm, normal S1 S2, no S3 or S4, no murmur, click or rub, no peripheral edema  ABDOMEN: soft, " nontender, no hepatosplenomegaly, no masses and bowel sounds normal  MS: no gross musculoskeletal defects noted, no edema  SKIN: no suspicious lesions or rashes    Recent Labs   Lab Test 05/10/23  1048 08/26/22  1515    138   POTASSIUM 4.6 4.2   CR 0.90 0.85   A1C 5.6 5.5        Diagnostics  No labs were ordered during this visit.   No EKG required, no history of coronary heart disease, significant arrhythmia, peripheral arterial disease or other structural heart disease.    Revised Cardiac Risk Index (RCRI)  The patient has the following serious cardiovascular risks for perioperative complications:   - No serious cardiac risks = 0 points     RCRI Interpretation: 0 points: Class I (very low risk - 0.4% complication rate)         Signed Electronically by: ANIVAL Tsang CNP  Copy of this evaluation report is provided to requesting physician.         Answers submitted by the patient for this visit:  Patient Health Questionnaire (Submitted on 2/7/2024)  If you checked off any problems, how difficult have these problems made it for you to do your work, take care of things at home, or get along with other people?: Not difficult at all  PHQ9 TOTAL SCORE: 2

## 2024-02-15 ENCOUNTER — ANESTHESIA EVENT (OUTPATIENT)
Dept: SURGERY | Facility: AMBULATORY SURGERY CENTER | Age: 64
End: 2024-02-15
Payer: COMMERCIAL

## 2024-02-15 RX ORDER — FENTANYL CITRATE 50 UG/ML
50 INJECTION, SOLUTION INTRAMUSCULAR; INTRAVENOUS EVERY 5 MIN PRN
Status: CANCELLED | OUTPATIENT
Start: 2024-02-15

## 2024-02-15 RX ORDER — ONDANSETRON 4 MG/1
4 TABLET, ORALLY DISINTEGRATING ORAL EVERY 30 MIN PRN
Status: CANCELLED | OUTPATIENT
Start: 2024-02-15

## 2024-02-15 RX ORDER — OXYCODONE HYDROCHLORIDE 5 MG/1
5 TABLET ORAL
Status: CANCELLED | OUTPATIENT
Start: 2024-02-15

## 2024-02-15 RX ORDER — FENTANYL CITRATE 50 UG/ML
25 INJECTION, SOLUTION INTRAMUSCULAR; INTRAVENOUS EVERY 5 MIN PRN
Status: CANCELLED | OUTPATIENT
Start: 2024-02-15

## 2024-02-15 RX ORDER — OXYCODONE HYDROCHLORIDE 5 MG/1
10 TABLET ORAL
Status: CANCELLED | OUTPATIENT
Start: 2024-02-15

## 2024-02-15 RX ORDER — ONDANSETRON 2 MG/ML
4 INJECTION INTRAMUSCULAR; INTRAVENOUS EVERY 30 MIN PRN
Status: CANCELLED | OUTPATIENT
Start: 2024-02-15

## 2024-02-15 RX ORDER — SODIUM CHLORIDE, SODIUM LACTATE, POTASSIUM CHLORIDE, CALCIUM CHLORIDE 600; 310; 30; 20 MG/100ML; MG/100ML; MG/100ML; MG/100ML
INJECTION, SOLUTION INTRAVENOUS CONTINUOUS
Status: CANCELLED | OUTPATIENT
Start: 2024-02-15

## 2024-02-15 NOTE — ANESTHESIA PREPROCEDURE EVALUATION
Anesthesia Pre-Procedure Evaluation    Patient: Lenore Martinez   MRN: [id number] : 1960        Procedure : Procedure(s):  ARTHROPLASTY, CARPOMETACARPAL JOINT, THUMB, palmaris longus interposition arthroplasty          Past Medical History:   Diagnosis Date    Anxiety     Bipolar disease, chronic (H)     Diabetic neuropathy (H) 2021    Hyperlipidemia LDL goal <70     Hypovitaminosis D 2014    Iron deficiency     Migraines     Neuropathy     Primary osteoarthritis of first carpometacarpal joint of left hand 2023    PTSD (post-traumatic stress disorder) 2013    Diagnosed 2005      Past Surgical History:   Procedure Laterality Date    COLONOSCOPY N/A 2022    Procedure: COLONOSCOPY, WITH POLYPECTOMY With Clips;  Surgeon: Darien Norris MD;  Location: [facility] OR    ESOPHAGOSCOPY, GASTROSCOPY, DUODENOSCOPY (EGD), COMBINED N/A 10/19/2023    Procedure: Esophagoscopy, gastroscopy, duodenoscopy (EGD), combined;  Surgeon: Cira Chiu MD;  Location: [facility]    HC REMOVAL OF OVARIAN CYST(S)      HC TOOTH EXTRACTION W/FORCEP      HYSTERECTOMY, PAP NO LONGER INDICATED  10/1/2009    has ovaries    MYRINGOTOMY      as  a child      Allergies   Allergen Reactions    Compazine      Anxiety        Social History     Tobacco Use    Smoking status: Never    Smokeless tobacco: Never    Tobacco comments:     smoke free household.   Substance Use Topics    Alcohol use: Yes     Comment: 1-2 a month      Wt Readings from Last 1 Encounters:   24 63.6 kg (140 lb 3.2 oz)        Anesthesia Evaluation   Pt has had prior anesthetic.     No history of anesthetic complications       ROS/MED HX  ENT/Pulmonary:  - neg pulmonary ROS     Neurologic:     (+)      migraines,                          Cardiovascular:  - neg cardiovascular ROS     METS/Exercise Tolerance: >4 METS    Hematologic:  - neg hematologic  ROS     Musculoskeletal:   (+)  arthritis,             GI/Hepatic:  - neg GI/hepatic  "ROS     Renal/Genitourinary:  - neg Renal ROS     Endo:  - neg endo ROS   (+)  type II DM,       Diabetic complications: neuropathy.             Psychiatric/Substance Use:     (+) psychiatric history anxiety, depression and bipolar       Infectious Disease:  - neg infectious disease ROS     Malignancy:  - neg malignancy ROS     Other:  - neg other ROS          Physical Exam    Airway  airway exam normal      Mallampati: I   TM distance: > 3 FB   Neck ROM: full   Mouth opening: > 3 cm    Respiratory Devices and Support         Dental       (+) Completely normal teeth      Cardiovascular   cardiovascular exam normal       Rhythm and rate: regular and normal     Pulmonary   pulmonary exam normal        breath sounds clear to auscultation           OUTSIDE LABS:  CBC:   Lab Results   Component Value Date    WBC 5.1 03/12/2015    WBC 4.4 05/12/2014    HGB 13.3 03/12/2015    HGB 12.9 05/12/2014    HCT 40.5 03/12/2015    HCT 37.6 05/12/2014     03/12/2015     05/12/2014     BMP:   Lab Results   Component Value Date     05/10/2023     08/26/2022    POTASSIUM 4.6 05/10/2023    POTASSIUM 4.2 08/26/2022    CHLORIDE 103 05/10/2023    CHLORIDE 107 08/26/2022    CO2 27 05/10/2023    CO2 26 08/26/2022    BUN 10.7 05/10/2023    BUN 13 08/26/2022    CR 0.90 05/10/2023    CR 0.85 08/26/2022     (H) 05/10/2023     (H) 11/04/2022     COAGS: No results found for: \"PTT\", \"INR\", \"FIBR\"  POC: No results found for: \"BGM\", \"HCG\", \"HCGS\"  HEPATIC:   Lab Results   Component Value Date    ALBUMIN 4.5 11/06/2023    PROTTOTAL 6.8 11/06/2023    ALT 16 11/06/2023    AST 25 11/06/2023    ALKPHOS 81 11/06/2023    BILITOTAL 0.4 11/06/2023    BILIDIRECT 0.5 03/09/2012     OTHER:   Lab Results   Component Value Date    A1C 5.6 05/10/2023    GABRIEL 9.3 05/10/2023    TSH 3.63 08/26/2022    T4 0.81 05/12/2014       Anesthesia Plan    ASA Status:  3    NPO Status:  NPO Appropriate    Anesthesia Type: General.     - " Airway: LMA   Induction: Intravenous, Propofol.   Maintenance: Balanced.        Consents    Anesthesia Plan(s) and associated risks, benefits, and realistic alternatives discussed. Questions answered and patient/representative(s) expressed understanding.     - Discussed:     - Discussed with:  Patient       Use of blood products discussed: No .     Postoperative Care    Pain management: Multi-modal analgesia, Oral pain medications.   PONV prophylaxis: Ondansetron (or other 5HT-3), Dexamethasone or Solumedrol     Comments:               Davie Weldon, DO    I have reviewed the pertinent notes and labs in the chart from the past 30 days and (re)examined the patient.  Any updates or changes from those notes are reflected in this note.

## 2024-02-16 ENCOUNTER — HOSPITAL ENCOUNTER (OUTPATIENT)
Facility: AMBULATORY SURGERY CENTER | Age: 64
Discharge: HOME | End: 2024-02-16
Attending: ORTHOPAEDIC SURGERY | Admitting: ORTHOPAEDIC SURGERY
Payer: COMMERCIAL

## 2024-02-16 ENCOUNTER — ANESTHESIA (OUTPATIENT)
Dept: SURGERY | Facility: AMBULATORY SURGERY CENTER | Age: 64
End: 2024-02-16
Payer: COMMERCIAL

## 2024-02-16 VITALS
SYSTOLIC BLOOD PRESSURE: 104 MMHG | RESPIRATION RATE: 16 BRPM | OXYGEN SATURATION: 100 % | HEART RATE: 68 BPM | TEMPERATURE: 97.6 F | DIASTOLIC BLOOD PRESSURE: 64 MMHG

## 2024-02-16 DIAGNOSIS — M18.12 PRIMARY OSTEOARTHRITIS OF FIRST CARPOMETACARPAL JOINT OF LEFT HAND: Primary | ICD-10-CM

## 2024-02-16 PROCEDURE — G8916 PT W IV AB GIVEN ON TIME: HCPCS

## 2024-02-16 PROCEDURE — 25447 ARTHRP NTRCRP/CRP/MTCR NTRPS: CPT | Performed by: NURSE ANESTHETIST, CERTIFIED REGISTERED

## 2024-02-16 PROCEDURE — 25447 ARTHRP NTRCRP/CRP/MTCR NTRPS: CPT | Performed by: ANESTHESIOLOGY

## 2024-02-16 PROCEDURE — 64415 NJX AA&/STRD BRCH PLXS IMG: CPT | Mod: 59 | Performed by: ANESTHESIOLOGY

## 2024-02-16 PROCEDURE — 25447 ARTHRP NTRCRP/CRP/MTCR NTRPS: CPT | Mod: FA | Performed by: ORTHOPAEDIC SURGERY

## 2024-02-16 PROCEDURE — 25447 ARTHRP NTRCRP/CRP/MTCR NTRPS: CPT | Mod: LT

## 2024-02-16 PROCEDURE — G8907 PT DOC NO EVENTS ON DISCHARG: HCPCS

## 2024-02-16 RX ORDER — BUPIVACAINE HYDROCHLORIDE 5 MG/ML
INJECTION, SOLUTION EPIDURAL; INTRACAUDAL
Status: COMPLETED | OUTPATIENT
Start: 2024-02-16 | End: 2024-02-16

## 2024-02-16 RX ORDER — NALOXONE HYDROCHLORIDE 0.4 MG/ML
0.2 INJECTION, SOLUTION INTRAMUSCULAR; INTRAVENOUS; SUBCUTANEOUS
Status: DISCONTINUED | OUTPATIENT
Start: 2024-02-16 | End: 2024-02-17 | Stop reason: HOSPADM

## 2024-02-16 RX ORDER — ACETAMINOPHEN AND CODEINE PHOSPHATE 300; 30 MG/1; MG/1
1-2 TABLET ORAL EVERY 4 HOURS PRN
Qty: 20 TABLET | Refills: 1 | Status: SHIPPED | OUTPATIENT
Start: 2024-02-16 | End: 2024-02-19

## 2024-02-16 RX ORDER — FENTANYL CITRATE 50 UG/ML
25-50 INJECTION, SOLUTION INTRAMUSCULAR; INTRAVENOUS
Status: DISCONTINUED | OUTPATIENT
Start: 2024-02-16 | End: 2024-02-17 | Stop reason: HOSPADM

## 2024-02-16 RX ORDER — NALOXONE HYDROCHLORIDE 0.4 MG/ML
0.4 INJECTION, SOLUTION INTRAMUSCULAR; INTRAVENOUS; SUBCUTANEOUS
Status: DISCONTINUED | OUTPATIENT
Start: 2024-02-16 | End: 2024-02-17 | Stop reason: HOSPADM

## 2024-02-16 RX ORDER — SODIUM CHLORIDE, SODIUM LACTATE, POTASSIUM CHLORIDE, CALCIUM CHLORIDE 600; 310; 30; 20 MG/100ML; MG/100ML; MG/100ML; MG/100ML
INJECTION, SOLUTION INTRAVENOUS CONTINUOUS
Status: DISCONTINUED | OUTPATIENT
Start: 2024-02-16 | End: 2024-02-17 | Stop reason: HOSPADM

## 2024-02-16 RX ORDER — ACETAMINOPHEN 325 MG/1
975 TABLET ORAL ONCE
Status: COMPLETED | OUTPATIENT
Start: 2024-02-16 | End: 2024-02-16

## 2024-02-16 RX ORDER — FLUMAZENIL 0.1 MG/ML
0.2 INJECTION, SOLUTION INTRAVENOUS
Status: DISCONTINUED | OUTPATIENT
Start: 2024-02-16 | End: 2024-02-17 | Stop reason: HOSPADM

## 2024-02-16 RX ORDER — CEFAZOLIN SODIUM 2 G/50ML
2 SOLUTION INTRAVENOUS SEE ADMIN INSTRUCTIONS
Status: DISCONTINUED | OUTPATIENT
Start: 2024-02-16 | End: 2024-02-17 | Stop reason: HOSPADM

## 2024-02-16 RX ORDER — LIDOCAINE 40 MG/G
CREAM TOPICAL
Status: DISCONTINUED | OUTPATIENT
Start: 2024-02-16 | End: 2024-02-17 | Stop reason: HOSPADM

## 2024-02-16 RX ORDER — CEFAZOLIN SODIUM 2 G/50ML
2 SOLUTION INTRAVENOUS
Status: DISCONTINUED | OUTPATIENT
Start: 2024-02-16 | End: 2024-02-17 | Stop reason: HOSPADM

## 2024-02-16 RX ORDER — SODIUM CHLORIDE, SODIUM LACTATE, POTASSIUM CHLORIDE, CALCIUM CHLORIDE 600; 310; 30; 20 MG/100ML; MG/100ML; MG/100ML; MG/100ML
INJECTION, SOLUTION INTRAVENOUS CONTINUOUS PRN
Status: DISCONTINUED | OUTPATIENT
Start: 2024-02-16 | End: 2024-02-16

## 2024-02-16 RX ORDER — LIDOCAINE HYDROCHLORIDE 20 MG/ML
INJECTION, SOLUTION INFILTRATION; PERINEURAL PRN
Status: DISCONTINUED | OUTPATIENT
Start: 2024-02-16 | End: 2024-02-16

## 2024-02-16 RX ORDER — PROPOFOL 10 MG/ML
INJECTION, EMULSION INTRAVENOUS CONTINUOUS PRN
Status: DISCONTINUED | OUTPATIENT
Start: 2024-02-16 | End: 2024-02-16

## 2024-02-16 RX ADMIN — CEFAZOLIN SODIUM 2 G: 2 SOLUTION INTRAVENOUS at 07:31

## 2024-02-16 RX ADMIN — ACETAMINOPHEN 975 MG: 325 TABLET ORAL at 06:47

## 2024-02-16 RX ADMIN — SODIUM CHLORIDE, SODIUM LACTATE, POTASSIUM CHLORIDE, CALCIUM CHLORIDE: 600; 310; 30; 20 INJECTION, SOLUTION INTRAVENOUS at 07:23

## 2024-02-16 RX ADMIN — LIDOCAINE HYDROCHLORIDE 60 MG: 20 INJECTION, SOLUTION INFILTRATION; PERINEURAL at 07:35

## 2024-02-16 RX ADMIN — PROPOFOL 100 MCG/KG/MIN: 10 INJECTION, EMULSION INTRAVENOUS at 07:37

## 2024-02-16 RX ADMIN — PROPOFOL 60 MG: 10 INJECTION, EMULSION INTRAVENOUS at 07:39

## 2024-02-16 RX ADMIN — BUPIVACAINE HYDROCHLORIDE 25 ML: 5 INJECTION, SOLUTION EPIDURAL; INTRACAUDAL at 07:05

## 2024-02-16 RX ADMIN — SODIUM CHLORIDE, SODIUM LACTATE, POTASSIUM CHLORIDE, CALCIUM CHLORIDE: 600; 310; 30; 20 INJECTION, SOLUTION INTRAVENOUS at 07:31

## 2024-02-16 NOTE — ANESTHESIA CARE TRANSFER NOTE
"  Patient: Lenore DIOR Corless    Procedure: Procedure(s):  ARTHROPLASTY, CARPOMETACARPAL JOINT, THUMB, palmaris longus interposition arthroplasty       Diagnosis: Primary osteoarthritis of first carpometacarpal joint of left hand [M18.12]  Diagnosis Additional Information: No value filed.    Anesthesia Type:   General     Note:    Oropharynx: oropharynx clear of all foreign objects and spontaneously breathing  Level of Consciousness: awake  Oxygen Supplementation: room air    Independent Airway: airway patency satisfactory and stable  Dentition: dentition unchanged  Vital Signs Stable: post-procedure vital signs reviewed and stable  Report to RN Given: handoff report given  Patient transferred to: PACU  Comments: Warm blanket for back of neck.  Pt states \" stiff\"  Handoff Report: Identifed the Patient, Identified the Reponsible Provider, Reviewed the pertinent medical history, Discussed the surgical course, Reviewed Intra-OP anesthesia mangement and issues during anesthesia, Set expectations for post-procedure period and Allowed opportunity for questions and acknowledgement of understanding    Vitals:  Vitals Value Taken Time   BP     Temp     Pulse     Resp     SpO2 98 % 02/16/24 0929   Vitals shown include unfiled device data.    Electronically Signed By: ANIVAL Agarwal CRNA  February 16, 2024  9:31 AM  "

## 2024-02-16 NOTE — OP NOTE
Nasim Mercado MD Physician   Procedures    -   DATE OF SERVICE:  2/16/2024    SURGEON: NASIM MERCADO MD   ASSISTANT: DAPHNE Baez    PREOPERATIVE DIAGNOSIS: Left thumb carpometacarpal osteoarthritis.  POSTOPERATIVE DIAGNOSIS: Left thumb carpometacarpal osteoarthritis.   PROCEDURE PERFORMED: Left trapezial excision with palmaris longus interposition arthroplasty        HISTORY   This is a 63 year old  female with left thumb carpometacarpal osteoarthritis.  She has failed conservative treatment.   She presents now   for left trapezial excision with interposition arthroplasty from palmaris longus tendon.  Assistance of DAPHNE Baez was medically necessary given the technical complexity of the case; with assistance with patient positioning, retraction and trapezium exposure,  assistance with implant placement, and wound closure.    DESCRIPTION OF PROCEDURE   After a smooth General anesthetic, the patient's left arm  was prepped and   draped in sterile fashion. A pause was performed for patient verification.   The left arm   was exsanguinated, tourniquet inflated to 250 mm Hg.     A longitudinal incision was made on the radial side of the thumb CMC joint just dorsal to the first dorsal compartment tendons.  This was carried down into subcutaneous tissue.  A radial nerve branch was identified and retracted.  The joint capsule of the CMC joint was opened.  I freed periosteum from the base of the metacarpal to assist with retraction.  I then used combination of knife dissection and elevator to free the trapezium from its bed.  It was difficult to extract this, so I divided it in 2 sections with a rongeur coming through the middle.  Eventually I was able to remove all fragments of the trapezium.    At this point attention was turned to the palmaris longus tendon.  Transverse incision was made at the wrist carried down to expose the tendon.  The tendon was amputated distally and 2-0 Vicryl suture  sewed into the tendon.  I then used a tendon harvester to harvest the entire palmaris longus tendon and muscle proximally.  Once this was harvested it was sewn into a ball with the 2-0 Vicryl suture starting with the tendon and wrapping it eventually with the muscle.  A firm ball was obtained.  We then returned to the trapezium excision and anchor the graft down into the base with 2 individual points with the 2-0 Vicryl suture.  I then sewed the capsule closed with running 0 Vicryl suture.  Subcutaneous tissue was closed with interrupted 3-0 Vicryl suture.  Skin edges were closed with a subcuticular 3-0 Prolene suture.  Steri-Strips were applied sterile dressings were applied a thumb spica splint was applied and the patient was taken recovery room in stable condition with tourniquet deflated.      NATALYA MERCADO MD     cc:   Sudhakar Ledezma   PATIENT: Lenore Martinez   MR#: [id number]   : 1960   ADMIT DATE: 2024

## 2024-02-16 NOTE — ANESTHESIA POSTPROCEDURE EVALUATION
Patient: Lenore DIOR Corstar    Procedure: Procedure(s):  ARTHROPLASTY, CARPOMETACARPAL JOINT, THUMB, palmaris longus interposition arthroplasty       Anesthesia Type:  General    Note:  Disposition: Outpatient   Postop Pain Control: Uneventful            Sign Out: Well controlled pain   PONV: No   Neuro/Psych: Uneventful            Sign Out: Acceptable/Baseline neuro status   Airway/Respiratory: Uneventful            Sign Out: Acceptable/Baseline resp. status   CV/Hemodynamics: Uneventful            Sign Out: Acceptable CV status; No obvious hypovolemia; No obvious fluid overload   Other NRE: NONE   DID A NON-ROUTINE EVENT OCCUR? No           Last vitals:  Vitals Value Taken Time   /64 02/16/24 1005   Temp 97.6  F (36.4  C) 02/16/24 1005   Pulse 82 02/16/24 0932   Resp 16 02/16/24 0930   SpO2 100 % 02/16/24 1005   Vitals shown include unfiled device data.    Electronically Signed By: Davie Weldon DO  February 16, 2024  11:21 AM

## 2024-02-23 ENCOUNTER — MYC MEDICAL ADVICE (OUTPATIENT)
Dept: FAMILY MEDICINE | Facility: CLINIC | Age: 64
End: 2024-02-23
Payer: COMMERCIAL

## 2024-02-25 ENCOUNTER — TRANSFERRED RECORDS (OUTPATIENT)
Dept: MULTI SPECIALTY CLINIC | Facility: CLINIC | Age: 64
End: 2024-02-25
Payer: COMMERCIAL

## 2024-02-25 LAB — RETINOPATHY: NORMAL

## 2024-02-27 NOTE — TELEPHONE ENCOUNTER
RN replied to patient via [employer]. See message for details.     Josef Melton RN, BSN, PHN  [facility]: [place]

## 2024-02-29 ENCOUNTER — OFFICE VISIT (OUTPATIENT)
Dept: FAMILY MEDICINE | Facility: CLINIC | Age: 64
End: 2024-02-29
Payer: OTHER MISCELLANEOUS

## 2024-02-29 VITALS
OXYGEN SATURATION: 97 % | HEART RATE: 66 BPM | RESPIRATION RATE: 20 BRPM | DIASTOLIC BLOOD PRESSURE: 63 MMHG | WEIGHT: 140.8 LBS | SYSTOLIC BLOOD PRESSURE: 97 MMHG | BODY MASS INDEX: 23.46 KG/M2 | TEMPERATURE: 97.6 F | HEIGHT: 65 IN
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
## 2024-02-29 DIAGNOSIS — S00.83XD FACIAL HEMATOMA, SUBSEQUENT ENCOUNTER: ICD-10-CM

## 2024-02-29 DIAGNOSIS — Z87.828 HISTORY OF FACIAL TRAUMA: ICD-10-CM

## 2024-02-29 PROCEDURE — 99213 OFFICE O/P EST LOW 20 MIN: CPT

## 2024-02-29 PROCEDURE — 99207 E-CONSULT TO ENT (ADULT OUTPT PROVIDER TO SPECIALIST WRITTEN QUESTION & RESPONSE): CPT

## 2024-02-29 ASSESSMENT — PAIN SCALES - GENERAL: PAINLEVEL: MILD PAIN (2)
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
## 2024-03-01 ENCOUNTER — E-CONSULT (OUTPATIENT)
Dept: OTOLARYNGOLOGY | Facility: CLINIC | Age: 64
End: 2024-03-01
Payer: COMMERCIAL

## 2024-03-01 DIAGNOSIS — R51.9 FACIAL PAIN: Primary | ICD-10-CM

## 2024-03-01 PROCEDURE — 99451 NTRPROF PH1/NTRNET/EHR 5/>: CPT | Performed by: OTOLARYNGOLOGY
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
## 2024-03-04 ENCOUNTER — OFFICE VISIT (OUTPATIENT)
Dept: ORTHOPEDICS | Facility: CLINIC | Age: 64
End: 2024-03-04
Payer: COMMERCIAL

## 2024-03-04 VITALS
SYSTOLIC BLOOD PRESSURE: 101 MMHG | WEIGHT: 140 LBS | DIASTOLIC BLOOD PRESSURE: 67 MMHG | BODY MASS INDEX: 23.32 KG/M2 | HEIGHT: 65 IN | RESPIRATION RATE: 18 BRPM | HEART RATE: 74 BPM

## 2024-03-04 DIAGNOSIS — M18.12 PRIMARY OSTEOARTHRITIS OF FIRST CARPOMETACARPAL JOINT OF LEFT HAND: Primary | ICD-10-CM

## 2024-03-04 PROCEDURE — 99024 POSTOP FOLLOW-UP VISIT: CPT | Performed by: ORTHOPAEDIC SURGERY

## 2024-03-04 NOTE — LETTER
3/4/2024         RE: Lenore Martinez  [address]  [place] [address]        Dear Colleague,    Thank you for referring your patient, Lenore Martinez, to the [facility]. Please see a copy of my visit note below.    Follow up left trapezial excision with palmaris longus interposition arthroplasty on 2/ 16 24.  Splint is removed.  Wound is healing well.   Sutures were removed.  We will place thumb spica splint.  Return to clinic 2 weeks for x-ray hand.      Again, thank you for allowing me to participate in the care of your patient.        Sincerely,        Nasim Nice MD

## 2024-03-04 NOTE — PROGRESS NOTES
Follow up left trapezial excision with palmaris longus interposition arthroplasty on 2/ 16 24.  Splint is removed.  Wound is healing well.   Sutures were removed.  We will place thumb spica splint.  Return to clinic 2 weeks for x-ray hand.

## 2024-03-06 ENCOUNTER — MYC MEDICAL ADVICE (OUTPATIENT)
Dept: ORTHOPEDICS | Facility: CLINIC | Age: 64
End: 2024-03-06
Payer: COMMERCIAL

## 2024-03-06 ENCOUNTER — MYC MEDICAL ADVICE (OUTPATIENT)
Dept: FAMILY MEDICINE | Facility: CLINIC | Age: 64
End: 2024-03-06
Payer: COMMERCIAL

## 2024-03-07 NOTE — TELEPHONE ENCOUNTER
Called patient and discussed concerns. Pain today is 2/10, she is using ibuprofen to help with pain. She notes that she has not sen any more drainage from the incision since the last photograph. Scheduled patient for follow up on Monday. Instructed patient to monitor for signs of infection over the weekend and to continue to use the brace. She understood and had no further questions.     She GREATLY appreciated the call.    Kiera Farfan MS, ATC  Certified Athletic Trainer

## 2024-03-11 ENCOUNTER — ANCILLARY PROCEDURE (OUTPATIENT)
Dept: GENERAL RADIOLOGY | Facility: CLINIC | Age: 64
End: 2024-03-11
Attending: ORTHOPAEDIC SURGERY
Payer: COMMERCIAL

## 2024-03-11 ENCOUNTER — OFFICE VISIT (OUTPATIENT)
Dept: ORTHOPEDICS | Facility: CLINIC | Age: 64
End: 2024-03-11
Payer: COMMERCIAL

## 2024-03-11 VITALS
WEIGHT: 140 LBS | SYSTOLIC BLOOD PRESSURE: 124 MMHG | BODY MASS INDEX: 23.32 KG/M2 | HEART RATE: 88 BPM | DIASTOLIC BLOOD PRESSURE: 74 MMHG | HEIGHT: 65 IN | RESPIRATION RATE: 18 BRPM

## 2024-03-11 DIAGNOSIS — M18.12 ARTHRITIS OF CARPOMETACARPAL (CMC) JOINT OF LEFT THUMB: ICD-10-CM

## 2024-03-11 DIAGNOSIS — M18.12 ARTHRITIS OF CARPOMETACARPAL (CMC) JOINT OF LEFT THUMB: Primary | ICD-10-CM

## 2024-03-11 PROCEDURE — 73130 X-RAY EXAM OF HAND: CPT | Mod: TC | Performed by: RADIOLOGY

## 2024-03-11 PROCEDURE — 99024 POSTOP FOLLOW-UP VISIT: CPT | Performed by: ORTHOPAEDIC SURGERY

## 2024-03-11 NOTE — PROGRESS NOTES
Cast/splint application    Date/Time: 3/11/2024 10:37 AM    Performed by: Nasim Nice MD  Authorized by: Nasim Nice MD    Consent:     Consent obtained:  Verbal    Consent given by:  Patient    Alternatives discussed:  No treatment  Pre-procedure details:     Sensation:  Normal  Procedure details:     Laterality:  Left    Splint type: thumb spica.    Supplies:  Fiberglass (orthoglass)  Post-procedure details:     Pain:  Improved    Sensation:  Normal

## 2024-03-11 NOTE — PROGRESS NOTES
Follow up left trapezial excision with palmaris longus interposition arthroplasty on 2/16/24.  The thumb spica splint is causing irritation.  Splint is removed.  Wound is healing well.   Sensation, motor and circulation are intact.    X-ray shows excision of the trapezium.    We will place new thumb spica splint.  Return to clinic 2 weeks for clinical check

## 2024-03-11 NOTE — LETTER
3/11/2024         RE: Lenore Martinez  [address]  [place] [address]        Dear Colleague,    Thank you for referring your patient, Lenore Martinez, to the [facility]. Please see a copy of my visit note below.    Cast/splint application    Date/Time: 3/11/2024 10:37 AM    Performed by: Nasim Nice MD  Authorized by: Nasim Nice MD    Consent:     Consent obtained:  Verbal    Consent given by:  Patient    Alternatives discussed:  No treatment  Pre-procedure details:     Sensation:  Normal  Procedure details:     Laterality:  Left    Splint type: thumb spica.    Supplies:  Fiberglass (orthoglass)  Post-procedure details:     Pain:  Improved    Sensation:  Normal        Follow up left trapezial excision with palmaris longus interposition arthroplasty on 2/16/24.  The thumb spica splint is causing irritation.  Splint is removed.  Wound is healing well.   Sensation, motor and circulation are intact.    X-ray shows excision of the trapezium.    We will place new thumb spica splint.  Return to clinic 2 weeks for clinical check      Again, thank you for allowing me to participate in the care of your patient.        Sincerely,        Nasim Nice MD

## 2024-03-13 NOTE — TELEPHONE ENCOUNTER
Called patient. Relayed E-consult results and sent [employer] message.     Tod Grover,     The ENT E-consult we submitted for your facial injury returned with the following recommendation:    Unfortunately there is not much to do here other than continue symptomatic treatment. I would encourage ice to area, tylenol/ibuprofen for pain. Could try facial massage as well. It may take up to 6 months to resolve as I suspect some nerve bruising.     If still bothersome at 6 months, refer to neurology or TMD clinic.     Options to consider for current symptoms:  - facial massage  - gua sha or lymphatic massage - look up youtube videos   - tylenol and ibuprofen for pain  - acupuncture, let me know if your insurance requires a referral    Please let me know if you have any questions or concerns.     Thanks,     Sudhakar

## 2024-03-25 ENCOUNTER — OFFICE VISIT (OUTPATIENT)
Dept: ORTHOPEDICS | Facility: CLINIC | Age: 64
End: 2024-03-25
Payer: COMMERCIAL

## 2024-03-25 ENCOUNTER — ANCILLARY PROCEDURE (OUTPATIENT)
Dept: MAMMOGRAPHY | Facility: CLINIC | Age: 64
End: 2024-03-25
Payer: COMMERCIAL

## 2024-03-25 VITALS
WEIGHT: 140 LBS | RESPIRATION RATE: 18 BRPM | HEIGHT: 64 IN | SYSTOLIC BLOOD PRESSURE: 97 MMHG | BODY MASS INDEX: 23.9 KG/M2 | DIASTOLIC BLOOD PRESSURE: 64 MMHG | HEART RATE: 70 BPM

## 2024-03-25 DIAGNOSIS — Z12.31 VISIT FOR SCREENING MAMMOGRAM: ICD-10-CM

## 2024-03-25 DIAGNOSIS — M18.12 PRIMARY OSTEOARTHRITIS OF FIRST CARPOMETACARPAL JOINT OF LEFT HAND: Primary | ICD-10-CM

## 2024-03-25 PROCEDURE — 99024 POSTOP FOLLOW-UP VISIT: CPT | Performed by: ORTHOPAEDIC SURGERY

## 2024-03-25 PROCEDURE — 77063 BREAST TOMOSYNTHESIS BI: CPT | Mod: TC | Performed by: STUDENT IN AN ORGANIZED HEALTH CARE EDUCATION/TRAINING PROGRAM

## 2024-03-25 PROCEDURE — 77067 SCR MAMMO BI INCL CAD: CPT | Mod: TC | Performed by: STUDENT IN AN ORGANIZED HEALTH CARE EDUCATION/TRAINING PROGRAM

## 2024-03-25 NOTE — PROGRESS NOTES
Follow up left trapezial excision with palmaris longus interposition arthroplasty on 2/16/24.  The thumb spica splint is removed.  Wound is healing well.   Thumb position looks good.  Mild stiffness of thumb.  Her trigger thumb has resolved with the rest in the splint.  Sensation, motor and circulation are intact.    X-ray previously shows excision of the trapezium.    Assessment:  left trapezial excision with palmaris longus interposition arthroplasty doing well.  Plan:  work on thumb range of motion.  Resume activity as tolerated.  Return to clinic as needed if any issues.

## 2024-03-25 NOTE — LETTER
3/25/2024         RE: Lenore Martinez  [address]  [place] [address]        Dear Colleague,    Thank you for referring your patient, Lenore Martinez, to the [facility]. Please see a copy of my visit note below.    Follow up left trapezial excision with palmaris longus interposition arthroplasty on 2/16/24.  The thumb spica splint is removed.  Wound is healing well.   Thumb position looks good.  Mild stiffness of thumb.  Her trigger thumb has resolved with the rest in the splint.  Sensation, motor and circulation are intact.    X-ray previously shows excision of the trapezium.    Assessment:  left trapezial excision with palmaris longus interposition arthroplasty doing well.  Plan:  work on thumb range of motion.  Resume activity as tolerated.  Return to clinic as needed if any issues.      Again, thank you for allowing me to participate in the care of your patient.        Sincerely,        Nasim Nice MD

## 2024-05-06 ENCOUNTER — OFFICE VISIT (OUTPATIENT)
Dept: ORTHOPEDICS | Facility: CLINIC | Age: 64
End: 2024-05-06
Payer: COMMERCIAL

## 2024-05-06 VITALS
BODY MASS INDEX: 23.9 KG/M2 | SYSTOLIC BLOOD PRESSURE: 89 MMHG | RESPIRATION RATE: 18 BRPM | HEART RATE: 76 BPM | DIASTOLIC BLOOD PRESSURE: 58 MMHG | WEIGHT: 140 LBS | HEIGHT: 64 IN

## 2024-05-06 DIAGNOSIS — M18.12 PRIMARY OSTEOARTHRITIS OF FIRST CARPOMETACARPAL JOINT OF LEFT HAND: Primary | ICD-10-CM

## 2024-05-06 PROCEDURE — 99024 POSTOP FOLLOW-UP VISIT: CPT | Performed by: ORTHOPAEDIC SURGERY

## 2024-05-06 NOTE — PROGRESS NOTES
Follow up left trapezial excision with palmaris longus interposition arthroplasty on 2/16/24.  She has noted pain in her palm when she pushes up out of a chair.  Wound is healing well.   Thumb position looks good.  She has good mobility of her thumb being able to touch the base of the small finger.  Her trigger thumb has resolved with the rest in the splint.  She has negative Tinel over the median nerve at the wrist  Sensation, motor and circulation are intact.    X-ray previously shows excision of the trapezium.    Assessment:  left trapezial excision with palmaris longus interposition arthroplasty doing well.  The palmar pain may just be residual postop pain.    Plan:  Resume activity as tolerated.  Return to clinic if signs of carpal tunnel or trigger finger show up.

## 2024-05-06 NOTE — LETTER
5/6/2024         RE: Lenore Martinez  [address]  [place] [address]        Dear Colleague,    Thank you for referring your patient, Lenore Martinez, to the [facility]. Please see a copy of my visit note below.    Follow up left trapezial excision with palmaris longus interposition arthroplasty on 2/16/24.  She has noted pain in her palm when she pushes up out of a chair.  Wound is healing well.   Thumb position looks good.  She has good mobility of her thumb being able to touch the base of the small finger.  Her trigger thumb has resolved with the rest in the splint.  She has negative Tinel over the median nerve at the wrist  Sensation, motor and circulation are intact.    X-ray previously shows excision of the trapezium.    Assessment:  left trapezial excision with palmaris longus interposition arthroplasty doing well.  The palmar pain may just be residual postop pain.    Plan:  Resume activity as tolerated.  Return to clinic if signs of carpal tunnel or trigger finger show up.    Again, thank you for allowing me to participate in the care of your patient.        Sincerely,        Nasim Nice MD

## 2024-05-10 SDOH — HEALTH STABILITY: PHYSICAL HEALTH: ON AVERAGE, HOW MANY MINUTES DO YOU ENGAGE IN EXERCISE AT THIS LEVEL?: 30 MIN

## 2024-05-10 SDOH — HEALTH STABILITY: PHYSICAL HEALTH: ON AVERAGE, HOW MANY DAYS PER WEEK DO YOU ENGAGE IN MODERATE TO STRENUOUS EXERCISE (LIKE A BRISK WALK)?: 6 DAYS

## 2024-05-10 ASSESSMENT — SOCIAL DETERMINANTS OF HEALTH (SDOH): HOW OFTEN DO YOU GET TOGETHER WITH FRIENDS OR RELATIVES?: ONCE A WEEK

## 2024-05-14 ENCOUNTER — PATIENT OUTREACH (OUTPATIENT)
Dept: FAMILY MEDICINE | Facility: CLINIC | Age: 64
End: 2024-05-14
Payer: COMMERCIAL

## 2024-05-14 NOTE — TELEPHONE ENCOUNTER
Patient Quality Outreach    Patient is due for the following:   Diabetes -  A1C, LDL (Fasting), Microalbumin, and Foot Exam  Colon Cancer Screening  Physical Preventive Adult Physical    Next Steps:   Schedule a Adult Preventative    Type of outreach:    Sent [employer] message.      Questions for provider review:    None           Mika Maya MA  Chart routed to Care Team.

## 2024-05-15 ENCOUNTER — OFFICE VISIT (OUTPATIENT)
Dept: FAMILY MEDICINE | Facility: CLINIC | Age: 64
End: 2024-05-15
Payer: COMMERCIAL

## 2024-05-15 ENCOUNTER — PATIENT OUTREACH (OUTPATIENT)
Dept: GASTROENTEROLOGY | Facility: CLINIC | Age: 64
End: 2024-05-15

## 2024-05-15 VITALS
HEIGHT: 64 IN | OXYGEN SATURATION: 98 % | TEMPERATURE: 98 F | RESPIRATION RATE: 12 BRPM | DIASTOLIC BLOOD PRESSURE: 64 MMHG | HEART RATE: 84 BPM | SYSTOLIC BLOOD PRESSURE: 96 MMHG | BODY MASS INDEX: 22.94 KG/M2 | WEIGHT: 134.4 LBS

## 2024-05-15 DIAGNOSIS — G62.9 NEUROPATHY: ICD-10-CM

## 2024-05-15 DIAGNOSIS — K31.84 GASTROPARESIS: ICD-10-CM

## 2024-05-15 DIAGNOSIS — F33.1 MODERATE EPISODE OF RECURRENT MAJOR DEPRESSIVE DISORDER (H): ICD-10-CM

## 2024-05-15 DIAGNOSIS — Z12.11 SPECIAL SCREENING FOR MALIGNANT NEOPLASMS, COLON: Primary | ICD-10-CM

## 2024-05-15 DIAGNOSIS — Z86.0100 HISTORY OF COLONIC POLYPS: ICD-10-CM

## 2024-05-15 DIAGNOSIS — R73.03 PREDIABETES: ICD-10-CM

## 2024-05-15 DIAGNOSIS — Z00.00 ROUTINE GENERAL MEDICAL EXAMINATION AT A HEALTH CARE FACILITY: Primary | ICD-10-CM

## 2024-05-15 DIAGNOSIS — Z90.710 H/O TOTAL HYSTERECTOMY: ICD-10-CM

## 2024-05-15 LAB — HBA1C MFR BLD: 5 % (ref 0–5.6)

## 2024-05-15 PROCEDURE — 90471 IMMUNIZATION ADMIN: CPT

## 2024-05-15 PROCEDURE — 90677 PCV20 VACCINE IM: CPT

## 2024-05-15 PROCEDURE — 84443 ASSAY THYROID STIM HORMONE: CPT

## 2024-05-15 PROCEDURE — 80053 COMPREHEN METABOLIC PANEL: CPT

## 2024-05-15 PROCEDURE — 99396 PREV VISIT EST AGE 40-64: CPT | Mod: 25

## 2024-05-15 PROCEDURE — 36415 COLL VENOUS BLD VENIPUNCTURE: CPT

## 2024-05-15 PROCEDURE — 82043 UR ALBUMIN QUANTITATIVE: CPT

## 2024-05-15 PROCEDURE — 80061 LIPID PANEL: CPT

## 2024-05-15 PROCEDURE — 96127 BRIEF EMOTIONAL/BEHAV ASSMT: CPT

## 2024-05-15 PROCEDURE — 84295 ASSAY OF SERUM SODIUM: CPT | Mod: 59

## 2024-05-15 PROCEDURE — 82570 ASSAY OF URINE CREATININE: CPT

## 2024-05-15 PROCEDURE — 83036 HEMOGLOBIN GLYCOSYLATED A1C: CPT

## 2024-05-15 PROCEDURE — 99214 OFFICE O/P EST MOD 30 MIN: CPT | Mod: 25

## 2024-05-15 RX ORDER — BUPROPION HYDROCHLORIDE 150 MG/1
TABLET ORAL
COMMUNITY
Start: 2024-05-07

## 2024-05-15 ASSESSMENT — PAIN SCALES - GENERAL: PAINLEVEL: NO PAIN (0)

## 2024-05-15 NOTE — PROGRESS NOTES
"CRC Screening Colonoscopy Referral Review    Patient meets the inclusion criteria for screening colonoscopy standing order.    Ordering/Referring Provider:  Sudhakar Ledezma      BMI: Estimated body mass index is 24.03 kg/m  as calculated from the following:    Height as of 5/6/24: 1.626 m (5' 4\").    Weight as of 5/6/24: 63.5 kg (140 lb).     Sedation:  Does patient have any of the following conditions affecting sedation?  No medical conditions affecting sedation.    Previous Scopes:  Any previous recommendations or follow up needs based on previous scope?  na / No recommendations.    Medical Concerns to Postpone Order:  Does patient have any of the following medical concerns that should postpone/delay colonoscopy referral?  No medical conditions affecting colonoscopy referral.    Final Referral Details:  Based on patient's medical history patient is appropriate for referral order with moderate sedation. If patient's BMI > 50 do not schedule in ASC.  "

## 2024-05-15 NOTE — NURSING NOTE
Prior to immunization administration, verified patients identity using patient s name and date of birth. Please see Immunization Activity for additional information.     Screening Questionnaire for Adult Immunization    Are you sick today?   No   Do you have allergies to medications, food, a vaccine component or latex?   No   Have you ever had a serious reaction after receiving a vaccination?   No   Do you have a long-term health problem with heart, lung, kidney, or metabolic disease (e.g., diabetes), asthma, a blood disorder, no spleen, complement component deficiency, a cochlear implant, or a spinal fluid leak?  Are you on long-term aspirin therapy?   No   Do you have cancer, leukemia, HIV/AIDS, or any other immune system problem?   No   Do you have a parent, brother, or sister with an immune system problem?   No   In the past 3 months, have you taken medications that affect  your immune system, such as prednisone, other steroids, or anticancer drugs; drugs for the treatment of rheumatoid arthritis, Crohn s disease, or psoriasis; or have you had radiation treatments?   No   Have you had a seizure, or a brain or other nervous system problem?   No   During the past year, have you received a transfusion of blood or blood    products, or been given immune (gamma) globulin or antiviral drug?   No   For women: Are you pregnant or is there a chance you could become       pregnant during the next month?   No   Have you received any vaccinations in the past 4 weeks?   No     Immunization questionnaire answers were all negative.      Patient instructed to remain in clinic for 15 minutes afterwards, and to report any adverse reactions.     Screening performed by Sugey Maya CMA on 5/15/2024 at 11:26 AM.

## 2024-05-15 NOTE — PATIENT INSTRUCTIONS
"Results from EGD in 2023 stated: consider G-POEM if symptoms persist. Peroral Endoscopic Myotomy (POEM)         Preventive Care Advice   This is general advice we often give to help people stay healthy. Your care team may have specific advice just for you. Please talk to your care team about your own preventive care needs.  Lifestyle  Exercise at least 150 minutes each week (30 minutes a day, 5 days a week).  Do muscle strengthening activities 2 days a week. These help control your weight and prevent disease.  No smoking.  Wear sunscreen to prevent skin cancer.  Have your home tested for radon every 2 to 5 years. Radon is a colorless, odorless gas that can harm your lungs. To learn more, go to www.health.[facility].[place].us and search for \"Radon in Homes.\"  Keep guns unloaded and locked up in a safe place like a safe or gun vault, or, use a gun lock and hide the keys. Always lock away bullets separately. To learn more, visit [employer].[place].gov and search for \"safe gun storage.\"  Nutrition  Eat 5 or more servings of fruits and vegetables each day.  Try wheat bread, brown rice and whole grain pasta (instead of white bread, rice, and pasta).  Get enough calcium and vitamin D. Check the label on foods and aim for 100% of the RDA (recommended daily allowance).  Regular exams  Have a dental exam and cleaning every 6 months.  See your health care team every year to talk about:  Any changes in your health.  Any medicines your care team has prescribed.  Preventive care, family planning, and ways to prevent chronic diseases.  Shots (vaccines)   HPV shots (up to age 26), if you've never had them before.  Hepatitis B shots (up to age 59), if you've never had them before.  COVID-19 shot: Get this shot when it's due.  Flu shot: Get a flu shot every year.  Tetanus shot: Get a tetanus shot every 10 years.  Pneumococcal, hepatitis A, and RSV shots: Ask your care team if you need these based on your risk.  Shingles shot (for age 50 and up).  General " health tests  Diabetes screening:  Starting at age 35, Get screened for diabetes at least every 3 years.  If you are younger than age 35, ask your care team if you should be screened for diabetes.  Cholesterol test: At age 39, start having a cholesterol test every 5 years, or more often if advised.  Bone density scan (DEXA): At age 50, ask your care team if you should have this scan for osteoporosis (brittle bones).  Hepatitis C: Get tested at least once in your life.  Abdominal aortic aneurysm screening: Talk to your doctor about having this screening if you:  Have ever smoked; and  Are biologically male; and  Are between the ages of 65 and 75.  STIs (sexually transmitted infections)  Before age 24: Ask your care team if you should be screened for STIs.  After age 24: Get screened for STIs if you're at risk. You are at risk for STIs (including HIV) if:  You are sexually active with more than one person.  You don't use condoms every time.  You or a partner was diagnosed with a sexually transmitted infection.  If you are at risk for HIV, ask about PrEP medicine to prevent HIV.  Get tested for HIV at least once in your life, whether you are at risk for HIV or not.  Cancer screening tests  Cervical cancer screening: If you have a cervix, begin getting regular cervical cancer screening tests at age 21. Most people who have regular screenings with normal results can stop after age 65. Talk about this with your provider.  Breast cancer scan (mammogram): If you've ever had breasts, begin having regular mammograms starting at age 40. This is a scan to check for breast cancer.  Colon cancer screening: It is important to start screening for colon cancer at age 45.  Have a colonoscopy test every 10 years (or more often if you're at risk) Or, ask your provider about stool tests like a FIT test every year or Cologuard test every 3 years.  To learn more about your testing options, visit: www.[employer]/448147.pdf.  For help  making a decision, visit: tiago/jg66548.  Prostate cancer screening test: If you have a prostate and are age 55 to 69, ask your provider if you would benefit from a yearly prostate cancer screening test.  Lung cancer screening: If you are a current or former smoker age 50 to 80, ask your care team if ongoing lung cancer screenings are right for you.  For informational purposes only. Not to replace the advice of your health care provider. Copyright   2023 [facility]. All rights reserved. Clinically reviewed by the [facility]. [employer] 039445 - REV 04/24.    Eating Healthy Foods: Care Instructions  With every meal, you can make healthy food choices. Try to eat a variety of fruits, vegetables, whole grains, lean proteins, and low-fat dairy products. This can help you get the right balance of nutrients, including vitamins and minerals. Small changes add up over time. You can start by adding one healthy food to your meals each day.    Try to make half your plate fruits and vegetables, one-fourth whole grains, and one-fourth lean proteins. Try including dairy with your meals.   Eat more fruits and vegetables. Try to have them with most meals and snacks.   Foods for healthy eating    Fruits    These can be fresh, frozen, canned, or dried.  Try to choose whole fruit rather than fruit juice.  Eat a variety of colors.    Vegetables    These can be fresh, frozen, canned, or dried.  Beans, peas, and lentils count too.    Whole grains    Choose whole-grain breads, cereals, and noodles.  Try brown rice.    Lean proteins    These can include lean meat, poultry, fish, and eggs.  You can also have tofu, beans, peas, lentils, nuts, and seeds.    Dairy    Try milk, yogurt, and cheese.  Choose low-fat or fat-free when you can.  If you need to, use lactose-free milk or fortified plant-based milk products, such as soy milk.    Water    Drink water when you're thirsty.  Limit sugar-sweetened  "drinks, including soda, fruit drinks, and sports drinks.  Where can you learn more?  Go to https://www.[employer].net/patiented  Enter T756 in the search box to learn more about \"Eating Healthy Foods: Care Instructions.\"  Current as of: September 20, 2023               Content Version: 14.0    3010-7513 [employer].   Care instructions adapted under license by your healthcare professional. If you have questions about a medical condition or this instruction, always ask your healthcare professional. [employer] disclaims any warranty or liability for your use of this information.      Learning About Stress  What is stress?     Stress is your body's response to a hard situation. Your body can have a physical, emotional, or mental response. Stress is a fact of life for most people, and it affects everyone differently. What causes stress for you may not be stressful for someone else.  A lot of things can cause stress. You may feel stress when you go on a job interview, take a test, or run a race. This kind of short-term stress is normal and even useful. It can help you if you need to work hard or react quickly. For example, stress can help you finish an important job on time.  Long-term stress is caused by ongoing stressful situations or events. Examples of long-term stress include long-term health problems, ongoing problems at work, or conflicts in your family. Long-term stress can harm your health.  How does stress affect your health?  When you are stressed, your body responds as though you are in danger. It makes hormones that speed up your heart, make you breathe faster, and give you a burst of energy. This is called the fight-or-flight stress response. If the stress is over quickly, your body goes back to normal and no harm is done.  But if stress happens too often or lasts too long, it can have bad effects. Long-term stress can make you more likely to get sick, and it can make symptoms of " some diseases worse. If you tense up when you are stressed, you may develop neck, shoulder, or low back pain. Stress is linked to high blood pressure and heart disease.  Stress also harms your emotional health. It can make you castellano, tense, or depressed. Your relationships may suffer, and you may not do well at work or school.  What can you do to manage stress?  You can try these things to help manage stress:   Do something active. Exercise or activity can help reduce stress. Walking is a great way to get started. Even everyday activities such as housecleaning or yard work can help.  Try yoga or alan chi. These techniques combine exercise and meditation. You may need some training at first to learn them.  Do something you enjoy. For example, listen to music or go to a movie. Practice your hobby or do volunteer work.  Meditate. This can help you relax, because you are not worrying about what happened before or what may happen in the future.  Do guided imagery. Imagine yourself in any setting that helps you feel calm. You can use online videos, books, or a teacher to guide you.  Do breathing exercises. For example:  From a standing position, bend forward from the waist with your knees slightly bent. Let your arms dangle close to the floor.  Breathe in slowly and deeply as you return to a standing position. Roll up slowly and lift your head last.  Hold your breath for just a few seconds in the standing position.  Breathe out slowly and bend forward from the waist.  Let your feelings out. Talk, laugh, cry, and express anger when you need to. Talking with supportive friends or family, a counselor, or a james leader about your feelings is a healthy way to relieve stress. Avoid discussing your feelings with people who make you feel worse.  Write. It may help to write about things that are bothering you. This helps you find out how much stress you feel and what is causing it. When you know this, you can find better ways to  "cope.  What can you do to prevent stress?  You might try some of these things to help prevent stress:  Manage your time. This helps you find time to do the things you want and need to do.  Get enough sleep. Your body recovers from the stresses of the day while you are sleeping.  Get support. Your family, friends, and community can make a difference in how you experience stress.  Limit your news feed. Avoid or limit time on social media or news that may make you feel stressed.  Do something active. Exercise or activity can help reduce stress. Walking is a great way to get started.  Where can you learn more?  Go to https://www.[employer].net/patiented  Enter N032 in the search box to learn more about \"Learning About Stress.\"  Current as of: October 24, 2023               Content Version: 14.0    7595-2820 [employer].   Care instructions adapted under license by your healthcare professional. If you have questions about a medical condition or this instruction, always ask your healthcare professional. Healthwise, "[employer] disclaims any warranty or liability for your use of this information.      "
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
## 2024-05-16 LAB
ALBUMIN SERPL BCG-MCNC: 4.9 G/DL (ref 3.5–5.2)
ALP SERPL-CCNC: 71 U/L (ref 40–150)
ALT SERPL W P-5'-P-CCNC: 13 U/L (ref 0–50)
ANION GAP SERPL CALCULATED.3IONS-SCNC: 10 MMOL/L (ref 7–15)
ANION GAP SERPL CALCULATED.3IONS-SCNC: 10 MMOL/L (ref 7–15)
AST SERPL W P-5'-P-CCNC: 17 U/L (ref 0–45)
BILIRUB SERPL-MCNC: 1 MG/DL
BUN SERPL-MCNC: 10.3 MG/DL (ref 8–23)
BUN SERPL-MCNC: 10.3 MG/DL (ref 8–23)
CALCIUM SERPL-MCNC: 9.4 MG/DL (ref 8.8–10.2)
CALCIUM SERPL-MCNC: 9.4 MG/DL (ref 8.8–10.2)
CHLORIDE SERPL-SCNC: 102 MMOL/L (ref 98–107)
CHLORIDE SERPL-SCNC: 102 MMOL/L (ref 98–107)
CHOLEST SERPL-MCNC: 210 MG/DL
CREAT SERPL-MCNC: 0.94 MG/DL (ref 0.51–0.95)
CREAT SERPL-MCNC: 0.94 MG/DL (ref 0.51–0.95)
CREAT UR-MCNC: 148 MG/DL
DEPRECATED HCO3 PLAS-SCNC: 27 MMOL/L (ref 22–29)
DEPRECATED HCO3 PLAS-SCNC: 27 MMOL/L (ref 22–29)
EGFRCR SERPLBLD CKD-EPI 2021: 69 ML/MIN/1.73M2
EGFRCR SERPLBLD CKD-EPI 2021: 69 ML/MIN/1.73M2
GLUCOSE SERPL-MCNC: 101 MG/DL (ref 70–99)
GLUCOSE SERPL-MCNC: 101 MG/DL (ref 70–99)
HDLC SERPL-MCNC: 59 MG/DL
LDLC SERPL CALC-MCNC: 139 MG/DL
MICROALBUMIN UR-MCNC: <12 MG/L
MICROALBUMIN/CREAT UR: NORMAL MG/G{CREAT}
NONHDLC SERPL-MCNC: 151 MG/DL
POTASSIUM SERPL-SCNC: 4.6 MMOL/L (ref 3.4–5.3)
POTASSIUM SERPL-SCNC: 4.6 MMOL/L (ref 3.4–5.3)
PROT SERPL-MCNC: 6.8 G/DL (ref 6.4–8.3)
SODIUM SERPL-SCNC: 139 MMOL/L (ref 135–145)
SODIUM SERPL-SCNC: 139 MMOL/L (ref 135–145)
TRIGL SERPL-MCNC: 60 MG/DL
TSH SERPL DL<=0.005 MIU/L-ACNC: 1.85 UIU/ML (ref 0.3–4.2)

## 2024-06-24 NOTE — ANESTHESIA PROCEDURE NOTES
14:20 Faxed clinicals to [facility].     Lino Melgar, [facility]  06/24/24 [id number]     Brachial plexus Procedure Note    Pre-Procedure   Staff -        Anesthesiologist:  Davie Weldon DO       Performed By: resident and anesthesiologist       Location: pre-op       Procedure Start/Stop Times: 2/16/2024 7:05 AM and 2/16/2024 7:10 AM       Pre-Anesthestic Checklist: patient identified, IV checked, site marked, risks and benefits discussed, informed consent, monitors and equipment checked, pre-op evaluation, at physician/surgeon's request and post-op pain management  Timeout:       Correct Patient: Yes        Correct Procedure: Yes        Correct Site: Yes        Correct Position: Yes        Correct Laterality: Yes        Site Marked: Yes  Procedure Documentation  Procedure: Brachial plexus       Diagnosis: POST OPERATIVE PAIN       Laterality: left       Patient Position: supine       Patient Prep/Sterile Barriers: sterile gloves, mask       Skin prep: Chloraprep       Local skin infiltrated with 2 mL of 1% lidocaine.  (supraclavicular approach).       Needle Type: short bevel       Needle Gauge: 21.        Needle Length (millimeters): 110        Ultrasound guided       1. Ultrasound was used to identify targeted nerve, plexus, vascular marker, or fascial plane and place a needle adjacent to it in real-time.       2. Ultrasound was used to visualize the spread of anesthetic in close proximity to the above referenced structure.       3. A permanent image is entered into the patient's record.       4. The visualized anatomic structures appeared normal.       5. There were no apparent abnormal pathologic findings.    Assessment/Narrative         The placement was negative for: blood aspirated, painful injection and site bleeding       Paresthesias: No.       Bolus given via needle..        Secured via.        Insertion/Infusion Method: Single Shot       Complications: none       Injection made incrementally with aspirations every 5 mL.    Medication(s) Administered   Bupivacaine 0.5% PF  "(Infiltration) - Infiltration   25 mL - 2/16/2024 7:05:00 AM  Medication Administration Time: 2/16/2024 7:05 AM     Comments:  Informed consent obtained.  All risks and benefits of the nerve block discussed with the patient.  All questions answered and all parties agreed with the plan.   Block was placed at the surgeon's request for post operative pain control.        FOR [facility] ([facility]) ONLY:   Pain Team Contact information: please page the Pain Team Via [employer]. Search \"Pain\". During daytime hours, please page the attending first. At night please page the resident first.      "

## 2024-09-25 DIAGNOSIS — E11.42 DIABETIC POLYNEUROPATHY ASSOCIATED WITH TYPE 2 DIABETES MELLITUS (H): ICD-10-CM

## 2024-09-25 RX ORDER — GABAPENTIN 600 MG/1
TABLET ORAL
Qty: 180 TABLET | Refills: 1 | Status: SHIPPED | OUTPATIENT
Start: 2024-09-25

## 2024-09-25 NOTE — TELEPHONE ENCOUNTER
Refill request for: gabapentin (NEURONTIN) 600 MG tablet    Directions: Take 2 tablets (1200 mg) by mouth 3 times daily     LOV: 10/25/23  NOV: 11/4/24    30 day supply with 1 refills Medication T'd for review and signature  Jud Collier CMA on 9/25/2024 at 10:05 AM  [facility]

## 2024-10-13 ENCOUNTER — HEALTH MAINTENANCE LETTER (OUTPATIENT)
Age: 64
End: 2024-10-13

## 2024-10-31 NOTE — PROGRESS NOTES
NEUROLOGY FOLLOW UP VISIT  NOTE       [facility] NEUROLOGY [place]  [address] [place] [address]  Tel: [phone]  Fax: [phone]  [email or web address]     Lenore Martinez,  1960, MRN [id number]  PCP: Sudhakar Ledezma  Date: 2024      ASSESSMENT & PLAN     Visit Diagnosis  Diabetic polyneuropathy associated with type 2 diabetes mellitus (H)  Benign familial tremor     Diabetic polyneuropathy  64-year-old female with bipolar disorder, PTSD with diabetic polyneuropathy.  She has worked on losing weight and her most recent hemoglobin A1c was 5.0.  Although her neuropathic symptoms have improved she is reluctant to come down on the dose of gabapentin.  I have recommended:    1.  Continue gabapentin 1200 mg 3 times daily.  Prescriptions filled  2.  Check gabapentin level  3.  Follow-up in 1 year    Benign familial tremor  Patient has started experiencing bilateral hand intention tremor.  In the past she had some leg tremors also.  She admits her mom and grandmother had similar tremors but she attributes her tremors to Wellbutrin and has sent a message to her psychiatrist to decrease the dose of Wellbutrin as she feels that will help.  There is no change she is open to the idea of trying some medication to help her tremors.  I have told her although she is already on gabapentin that sometimes is effective and controlling familial tremor, her preference would be to try primidone or Inderal.  In the meantime I have recommended:    1.  Check copper, ceruloplasmin and TSH.    2.  Follow-up in 1 year    Thank you again for this referral, please feel free to contact me if you have any questions.    Kedar Conn MD  [facility]     HISTORY OF PRESENT ILLNESS     Patient is a 64-year-old female with bipolar disorder, PTSD last seen on 10/25/2023 for diabetic polyneuropathy who returns for follow-up.  She had cut down on her carbohydrate intake and had lost  weight with normalization of her hemoglobin A1c.  She was continued on gabapentin 1200 mg 3 times daily and during her last visit gabapentin level was checked that was therapeutic.  She reports her paresthesias have improved and most recent hemoglobin A1c was 5.0.  However she is reluctant to decrease the dose of gabapentin.    She also has family history of tremors and that she has noticed lately she has been experiencing increased hand tremors.  She attributes that to taking Wellbutrin for her depression and has sent a message to her psychiatrist to consider decreasing the dose of Wellbutrin.  However she does admit her mom and maternal grandmother had similar tremors that got worse as they grew older.    Previously EMG had confirmed length-dependent mixed sensorimotor polyneuropathy     PROBLEM LIST   Patient Active Problem List   Diagnosis    PTSD (post-traumatic stress disorder)    Hypovitaminosis D    Migraine with aura     Hyperlipidemia LDL goal <70    Major depression    Trigger thumb of left hand    Primary osteoarthritis of first carpometacarpal joint of left hand    History of colonic polyps    Gastroparesis    H/O total hysterectomy    Benign familial tremor         PAST MEDICAL & SURGICAL HISTORY     Past Medical History:   Patient  has a past medical history of Anxiety, Bipolar disease, chronic (H), Depressive disorder (2000), Diabetic neuropathy (H) (06/03/2021), Hyperlipidemia LDL goal <70, Hypovitaminosis D (05/12/2014), Iron deficiency, Migraines, Neuropathy, Primary osteoarthritis of first carpometacarpal joint of left hand (12/18/2023), and PTSD (post-traumatic stress disorder) (04/16/2013).    Surgical History:  She  has a past surgical history that includes hysterectomy, pap no longer indicated (10/1/2009); REMOVAL OF OVARIAN CYST(S) (1987); TOOTH EXTRACTION W/FORCEP (1987); Myringotomy; Colonoscopy (N/A, 11/4/2022); Esophagoscopy, gastroscopy, duodenoscopy (EGD), combined (N/A, 10/19/2023);  "and Arthroplasty carpometacarpal (thumb joint) (Left, 2/16/2024).     SOCIAL HISTORY     Reviewed, and she  reports that she has never smoked. She has never been exposed to tobacco smoke. She has never used smokeless tobacco. She reports current alcohol use. She reports that she does not use drugs.     FAMILY HISTORY     Reviewed, and family history includes Bipolar Disorder in her son; Cerebrovascular Disease in her maternal grandfather; Diabetes in her brother, paternal grandmother, and son; Hyperlipidemia in her mother; Mental Illness in her brother, brother, mother, paternal grandmother, sister, and son; Substance Abuse in her brother and brother; Thyroid Disease in her son; Tremor in her maternal grandfather and mother.     ALLERGIES     Allergies   Allergen Reactions    Compazine      Anxiety           REVIEW OF SYSTEMS     A 12 point review of system was performed and was negative except as outlined in the history of present illness.     HOME MEDICATIONS     Current Outpatient Rx   Medication Sig Dispense Refill    Aspirin-Acetaminophen-Caffeine (EXCEDRIN MIGRAINE PO) Take  by mouth as needed.      buPROPion (WELLBUTRIN XL) 150 MG 24 hr tablet       citalopram (CELEXA) 20 MG tablet Take 20 mg by mouth 3 times daily      gabapentin (NEURONTIN) 600 MG tablet Take 2 tablets (1,200 mg) by mouth 3 times daily. 540 tablet 3    lamoTRIgine (LAMICTAL) 100 MG tablet Take 100 mg by mouth 3 times daily.      Specialty Vitamins Products (BRAIN PO) [employer] dynamic brain supplement      Vitamin D, Cholecalciferol, 1000 units TABS Take 1,000 mg by mouth daily           PHYSICAL EXAM     Vital signs  BP 95/62   Pulse 79   Ht 1.651 m (5' 5\")   Wt 62.1 kg (137 lb)   LMP  (LMP Unknown)   BMI 22.80 kg/m      Weight:   137 lbs 0 oz    Patient is alert and oriented x4 in no acute distress. Vital signs were reviewed and are documented in electronic medical record. Neck was supple, no carotid bruits, thyromegaly, JVD, " or lymphadenopathy was noted.   NEUROLOGY EXAM:   Patient s speech was normal with no aphasia or dysarthria. Mentation, and affect were also normal.    Funduscopic exam was normal, with normal cup to disc ratio. Cranial nerves II -XII were intact.    Patient had normal mass, tone and motor strength was 5/5 in all extremities without pronator drift.    Sensation was decreased to light touch pinprick below knees   Reflexes were 1+ upper extremity absent in the lower   No dysmetria noted on FNF or HKS. Romberg was negative.   Gait testing was normal.      PERTINENT DIAGNOSTIC STUDIES     Following studies were reviewed:     MRI BRAIN 7/9/2019  1.  No evidence of acute intracranial hemorrhage, mass effect, or infarction.  2.  Mild brain parenchymal volume loss.     EMG 10/26/2021  This is a borderline abnormal EEG that suggests length dependent mixed sensorimotor polyneuropathy.  This likely is due to patient's known prediabetes, clinical correlation is recommended.     EMG 8/21/2018  This is a normal study.  There is no electrophysiological evidence of a diffuse neuropathy, myopathy, or radiculpathy to affect the tested extremity muscles.      EMG 2/8/2011  This EMG nerve conduction study showed borderline reduced amplitude for the sural potentials, compatible with a very mild electrophysiological sensory neuropathy of the lower extremities.  Clinical correlation is recommended     PERTINENT LABS  Following labs were reviewed:  No visits with results within 3 Month(s) from this visit.   Latest known visit with results is:   Office Visit on 05/15/2024   Component Date Value Ref Range Status    Hemoglobin A1C 05/15/2024 5.0  0.0 - 5.6 % Final    Creatinine Urine mg/dL 05/15/2024 148.0  mg/dL Final    Albumin Urine mg/L 05/15/2024 <12.0  mg/L Final    Albumin Urine mg/g Cr 05/15/2024    Final    Sodium 05/15/2024 139  135 - 145 mmol/L Final    Potassium 05/15/2024 4.6  3.4 - 5.3 mmol/L Final    Chloride 05/15/2024 102  98  - 107 mmol/L Final    Carbon Dioxide (CO2) 05/15/2024 27  22 - 29 mmol/L Final    Anion Gap 05/15/2024 10  7 - 15 mmol/L Final    Urea Nitrogen 05/15/2024 10.3  8.0 - 23.0 mg/dL Final    Creatinine 05/15/2024 0.94  0.51 - 0.95 mg/dL Final    GFR Estimate 05/15/2024 69  >60 mL/min/1.73m2 Final    Calcium 05/15/2024 9.4  8.8 - 10.2 mg/dL Final    Glucose 05/15/2024 101 (H)  70 - 99 mg/dL Final    Cholesterol 05/15/2024 210 (H)  <200 mg/dL Final    Triglycerides 05/15/2024 60  <150 mg/dL Final    Direct Measure HDL 05/15/2024 59  >=50 mg/dL Final    LDL Cholesterol Calculated 05/15/2024 139 (H)  <=100 mg/dL Final    Non HDL Cholesterol 05/15/2024 151 (H)  <130 mg/dL Final    Sodium 05/15/2024 139  135 - 145 mmol/L Final    Potassium 05/15/2024 4.6  3.4 - 5.3 mmol/L Final    Carbon Dioxide (CO2) 05/15/2024 27  22 - 29 mmol/L Final    Anion Gap 05/15/2024 10  7 - 15 mmol/L Final    Urea Nitrogen 05/15/2024 10.3  8.0 - 23.0 mg/dL Final    Creatinine 05/15/2024 0.94  0.51 - 0.95 mg/dL Final    GFR Estimate 05/15/2024 69  >60 mL/min/1.73m2 Final    Calcium 05/15/2024 9.4  8.8 - 10.2 mg/dL Final    Chloride 05/15/2024 102  98 - 107 mmol/L Final    Glucose 05/15/2024 101 (H)  70 - 99 mg/dL Final    Alkaline Phosphatase 05/15/2024 71  40 - 150 U/L Final    AST 05/15/2024 17  0 - 45 U/L Final    ALT 05/15/2024 13  0 - 50 U/L Final    Protein Total 05/15/2024 6.8  6.4 - 8.3 g/dL Final    Albumin 05/15/2024 4.9  3.5 - 5.2 g/dL Final    Bilirubin Total 05/15/2024 1.0  <=1.2 mg/dL Final    TSH 05/15/2024 1.85  0.30 - 4.20 uIU/mL Final         Total time spent for face to face visit, reviewing labs/imaging studies, counseling and coordination of care was: 30 Minutes spent on the date of the encounter doing chart review, review of outside records, review of test results, interpretation of tests, patient visit, and documentation     The longitudinal plan of care for the diagnosis(es)/condition(s) as documented were addressed during  this visit. Due to the added complexity in care, I will continue to support Lenore in the subsequent management and with ongoing continuity of care.    This note was dictated using voice recognition software.  Any grammatical or context distortions are unintentional and inherent to the software.    Orders Placed This Encounter   Procedures    Gabapentin Level    Copper level    Ceruloplasmin    TSH with free T4 reflex      New Prescriptions    No medications on file     Modified Medications    Modified Medication Previous Medication    GABAPENTIN (NEURONTIN) 600 MG TABLET gabapentin (NEURONTIN) 600 MG tablet       Take 2 tablets (1,200 mg) by mouth 3 times daily.    TAKE 2 TABLETS(1200 MG) BY MOUTH THREE TIMES DAILY

## 2024-11-04 ENCOUNTER — OFFICE VISIT (OUTPATIENT)
Dept: NEUROLOGY | Facility: CLINIC | Age: 64
End: 2024-11-04
Payer: COMMERCIAL

## 2024-11-04 VITALS
BODY MASS INDEX: 22.82 KG/M2 | DIASTOLIC BLOOD PRESSURE: 62 MMHG | HEIGHT: 65 IN | WEIGHT: 137 LBS | HEART RATE: 79 BPM | SYSTOLIC BLOOD PRESSURE: 95 MMHG

## 2024-11-04 DIAGNOSIS — G25.0 BENIGN FAMILIAL TREMOR: ICD-10-CM

## 2024-11-04 DIAGNOSIS — E11.42 DIABETIC POLYNEUROPATHY ASSOCIATED WITH TYPE 2 DIABETES MELLITUS (H): Primary | ICD-10-CM

## 2024-11-04 PROCEDURE — 99214 OFFICE O/P EST MOD 30 MIN: CPT | Performed by: PSYCHIATRY & NEUROLOGY

## 2024-11-04 PROCEDURE — G2211 COMPLEX E/M VISIT ADD ON: HCPCS | Performed by: PSYCHIATRY & NEUROLOGY

## 2024-11-04 RX ORDER — GABAPENTIN 600 MG/1
1200 TABLET ORAL 3 TIMES DAILY
Qty: 540 TABLET | Refills: 3 | Status: SHIPPED | OUTPATIENT
Start: 2024-11-04

## 2024-11-04 NOTE — NURSING NOTE
Chief Complaint   Patient presents with    NEUROPATHY     Patient states that her hands have been shaking daily. Her neuropathy is improving.  Follow up   Melba Lenz on 11/4/2024 at 9:12 AM

## 2024-11-04 NOTE — LETTER
2024      Lenore Martinez  [address]  [place] [address]      Dear Colleague,    Thank you for referring your patient, Lenore Martinez, to the [facility] NEUROLOGY CLINIC [place]. Please see a copy of my visit note below.    NEUROLOGY FOLLOW UP VISIT  NOTE       [facility] NEUROLOGY [place]  [address] [place] [address]  Tel: [phone]  Fax: [phone]  [email or web address]     Lenore Martinez,  1960, MRN [id number]  PCP: Sudhakar Ledezma  Date: 2024      ASSESSMENT & PLAN     Visit Diagnosis  Diabetic polyneuropathy associated with type 2 diabetes mellitus (H)  Benign familial tremor     Diabetic polyneuropathy  64-year-old female with bipolar disorder, PTSD with diabetic polyneuropathy.  She has worked on losing weight and her most recent hemoglobin A1c was 5.0.  Although her neuropathic symptoms have improved she is reluctant to come down on the dose of gabapentin.  I have recommended:    1.  Continue gabapentin 1200 mg 3 times daily.  Prescriptions filled  2.  Check gabapentin level  3.  Follow-up in 1 year    Benign familial tremor  Patient has started experiencing bilateral hand intention tremor.  In the past she had some leg tremors also.  She admits her mom and grandmother had similar tremors but she attributes her tremors to Wellbutrin and has sent a message to her psychiatrist to decrease the dose of Wellbutrin as she feels that will help.  There is no change she is open to the idea of trying some medication to help her tremors.  I have told her although she is already on gabapentin that sometimes is effective and controlling familial tremor, her preference would be to try primidone or Inderal.  In the meantime I have recommended:    1.  Check copper, ceruloplasmin and TSH.    2.  Follow-up in 1 year    Thank you again for this referral, please feel free to contact me if you have any questions.    Kedar Conn MD  [facility]  NEUROLOGY, [place]     HISTORY OF PRESENT ILLNESS     Patient is a 64-year-old female with bipolar disorder, PTSD last seen on 10/25/2023 for diabetic polyneuropathy who returns for follow-up.  She had cut down on her carbohydrate intake and had lost weight with normalization of her hemoglobin A1c.  She was continued on gabapentin 1200 mg 3 times daily and during her last visit gabapentin level was checked that was therapeutic.  She reports her paresthesias have improved and most recent hemoglobin A1c was 5.0.  However she is reluctant to decrease the dose of gabapentin.    She also has family history of tremors and that she has noticed lately she has been experiencing increased hand tremors.  She attributes that to taking Wellbutrin for her depression and has sent a message to her psychiatrist to consider decreasing the dose of Wellbutrin.  However she does admit her mom and maternal grandmother had similar tremors that got worse as they grew older.    Previously EMG had confirmed length-dependent mixed sensorimotor polyneuropathy     PROBLEM LIST   Patient Active Problem List   Diagnosis     PTSD (post-traumatic stress disorder)     Hypovitaminosis D     Migraine with aura      Hyperlipidemia LDL goal <70     Major depression     Trigger thumb of left hand     Primary osteoarthritis of first carpometacarpal joint of left hand     History of colonic polyps     Gastroparesis     H/O total hysterectomy     Benign familial tremor         PAST MEDICAL & SURGICAL HISTORY     Past Medical History:   Patient  has a past medical history of Anxiety, Bipolar disease, chronic (H), Depressive disorder (2000), Diabetic neuropathy (H) (06/03/2021), Hyperlipidemia LDL goal <70, Hypovitaminosis D (05/12/2014), Iron deficiency, Migraines, Neuropathy, Primary osteoarthritis of first carpometacarpal joint of left hand (12/18/2023), and PTSD (post-traumatic stress disorder) (04/16/2013).    Surgical History:  She  has a past  "surgical history that includes hysterectomy, pap no longer indicated (10/1/2009); REMOVAL OF OVARIAN CYST(S) (1987); TOOTH EXTRACTION W/FORCEP (1987); Myringotomy; Colonoscopy (N/A, 11/4/2022); Esophagoscopy, gastroscopy, duodenoscopy (EGD), combined (N/A, 10/19/2023); and Arthroplasty carpometacarpal (thumb joint) (Left, 2/16/2024).     SOCIAL HISTORY     Reviewed, and she  reports that she has never smoked. She has never been exposed to tobacco smoke. She has never used smokeless tobacco. She reports current alcohol use. She reports that she does not use drugs.     FAMILY HISTORY     Reviewed, and family history includes Bipolar Disorder in her son; Cerebrovascular Disease in her maternal grandfather; Diabetes in her brother, paternal grandmother, and son; Hyperlipidemia in her mother; Mental Illness in her brother, brother, mother, paternal grandmother, sister, and son; Substance Abuse in her brother and brother; Thyroid Disease in her son; Tremor in her maternal grandfather and mother.     ALLERGIES     Allergies   Allergen Reactions     Compazine      Anxiety           REVIEW OF SYSTEMS     A 12 point review of system was performed and was negative except as outlined in the history of present illness.     HOME MEDICATIONS     Current Outpatient Rx   Medication Sig Dispense Refill     Aspirin-Acetaminophen-Caffeine (EXCEDRIN MIGRAINE PO) Take  by mouth as needed.       buPROPion (WELLBUTRIN XL) 150 MG 24 hr tablet        citalopram (CELEXA) 20 MG tablet Take 20 mg by mouth 3 times daily       gabapentin (NEURONTIN) 600 MG tablet Take 2 tablets (1,200 mg) by mouth 3 times daily. 540 tablet 3     lamoTRIgine (LAMICTAL) 100 MG tablet Take 100 mg by mouth 3 times daily.       Specialty Vitamins Products (BRAIN PO) [employer] dynamic brain supplement       Vitamin D, Cholecalciferol, 1000 units TABS Take 1,000 mg by mouth daily           PHYSICAL EXAM     Vital signs  BP 95/62   Pulse 79   Ht 1.651 m (5' 5\") "   Wt 62.1 kg (137 lb)   LMP  (LMP Unknown)   BMI 22.80 kg/m      Weight:   137 lbs 0 oz    Patient is alert and oriented x4 in no acute distress. Vital signs were reviewed and are documented in electronic medical record. Neck was supple, no carotid bruits, thyromegaly, JVD, or lymphadenopathy was noted.   NEUROLOGY EXAM:    Patient s speech was normal with no aphasia or dysarthria. Mentation, and affect were also normal.     Funduscopic exam was normal, with normal cup to disc ratio. Cranial nerves II -XII were intact.     Patient had normal mass, tone and motor strength was 5/5 in all extremities without pronator drift.     Sensation was decreased to light touch pinprick below knees    Reflexes were 1+ upper extremity absent in the lower    No dysmetria noted on FNF or HKS. Romberg was negative.    Gait testing was normal.      PERTINENT DIAGNOSTIC STUDIES     Following studies were reviewed:     MRI BRAIN 7/9/2019  1.  No evidence of acute intracranial hemorrhage, mass effect, or infarction.  2.  Mild brain parenchymal volume loss.     EMG 10/26/2021  This is a borderline abnormal EEG that suggests length dependent mixed sensorimotor polyneuropathy.  This likely is due to patient's known prediabetes, clinical correlation is recommended.     EMG 8/21/2018  This is a normal study.  There is no electrophysiological evidence of a diffuse neuropathy, myopathy, or radiculpathy to affect the tested extremity muscles.      EMG 2/8/2011  This EMG nerve conduction study showed borderline reduced amplitude for the sural potentials, compatible with a very mild electrophysiological sensory neuropathy of the lower extremities.  Clinical correlation is recommended     PERTINENT LABS  Following labs were reviewed:  No visits with results within 3 Month(s) from this visit.   Latest known visit with results is:   Office Visit on 05/15/2024   Component Date Value Ref Range Status     Hemoglobin A1C 05/15/2024 5.0  0.0 - 5.6 %  Final     Creatinine Urine mg/dL 05/15/2024 148.0  mg/dL Final     Albumin Urine mg/L 05/15/2024 <12.0  mg/L Final     Albumin Urine mg/g Cr 05/15/2024    Final     Sodium 05/15/2024 139  135 - 145 mmol/L Final     Potassium 05/15/2024 4.6  3.4 - 5.3 mmol/L Final     Chloride 05/15/2024 102  98 - 107 mmol/L Final     Carbon Dioxide (CO2) 05/15/2024 27  22 - 29 mmol/L Final     Anion Gap 05/15/2024 10  7 - 15 mmol/L Final     Urea Nitrogen 05/15/2024 10.3  8.0 - 23.0 mg/dL Final     Creatinine 05/15/2024 0.94  0.51 - 0.95 mg/dL Final     GFR Estimate 05/15/2024 69  >60 mL/min/1.73m2 Final     Calcium 05/15/2024 9.4  8.8 - 10.2 mg/dL Final     Glucose 05/15/2024 101 (H)  70 - 99 mg/dL Final     Cholesterol 05/15/2024 210 (H)  <200 mg/dL Final     Triglycerides 05/15/2024 60  <150 mg/dL Final     Direct Measure HDL 05/15/2024 59  >=50 mg/dL Final     LDL Cholesterol Calculated 05/15/2024 139 (H)  <=100 mg/dL Final     Non HDL Cholesterol 05/15/2024 151 (H)  <130 mg/dL Final     Sodium 05/15/2024 139  135 - 145 mmol/L Final     Potassium 05/15/2024 4.6  3.4 - 5.3 mmol/L Final     Carbon Dioxide (CO2) 05/15/2024 27  22 - 29 mmol/L Final     Anion Gap 05/15/2024 10  7 - 15 mmol/L Final     Urea Nitrogen 05/15/2024 10.3  8.0 - 23.0 mg/dL Final     Creatinine 05/15/2024 0.94  0.51 - 0.95 mg/dL Final     GFR Estimate 05/15/2024 69  >60 mL/min/1.73m2 Final     Calcium 05/15/2024 9.4  8.8 - 10.2 mg/dL Final     Chloride 05/15/2024 102  98 - 107 mmol/L Final     Glucose 05/15/2024 101 (H)  70 - 99 mg/dL Final     Alkaline Phosphatase 05/15/2024 71  40 - 150 U/L Final     AST 05/15/2024 17  0 - 45 U/L Final     ALT 05/15/2024 13  0 - 50 U/L Final     Protein Total 05/15/2024 6.8  6.4 - 8.3 g/dL Final     Albumin 05/15/2024 4.9  3.5 - 5.2 g/dL Final     Bilirubin Total 05/15/2024 1.0  <=1.2 mg/dL Final     TSH 05/15/2024 1.85  0.30 - 4.20 uIU/mL Final         Total time spent for face to face visit, reviewing labs/imaging  studies, counseling and coordination of care was: 30 Minutes spent on the date of the encounter doing chart review, review of outside records, review of test results, interpretation of tests, patient visit, and documentation     The longitudinal plan of care for the diagnosis(es)/condition(s) as documented were addressed during this visit. Due to the added complexity in care, I will continue to support Lenore in the subsequent management and with ongoing continuity of care.    This note was dictated using voice recognition software.  Any grammatical or context distortions are unintentional and inherent to the software.    Orders Placed This Encounter   Procedures     Gabapentin Level     Copper level     Ceruloplasmin     TSH with free T4 reflex      New Prescriptions    No medications on file     Modified Medications    Modified Medication Previous Medication    GABAPENTIN (NEURONTIN) 600 MG TABLET gabapentin (NEURONTIN) 600 MG tablet       Take 2 tablets (1,200 mg) by mouth 3 times daily.    TAKE 2 TABLETS(1200 MG) BY MOUTH THREE TIMES DAILY                 Again, thank you for allowing me to participate in the care of your patient.        Sincerely,        Kedar Conn MD

## 2024-12-03 ENCOUNTER — LAB (OUTPATIENT)
Dept: LAB | Facility: CLINIC | Age: 64
End: 2024-12-03
Payer: COMMERCIAL

## 2024-12-03 ENCOUNTER — OFFICE VISIT (OUTPATIENT)
Dept: FAMILY MEDICINE | Facility: CLINIC | Age: 64
End: 2024-12-03
Payer: COMMERCIAL

## 2024-12-03 VITALS
WEIGHT: 136.25 LBS | DIASTOLIC BLOOD PRESSURE: 59 MMHG | TEMPERATURE: 98.6 F | HEART RATE: 87 BPM | BODY MASS INDEX: 22.7 KG/M2 | SYSTOLIC BLOOD PRESSURE: 104 MMHG | OXYGEN SATURATION: 97 % | RESPIRATION RATE: 14 BRPM | HEIGHT: 65 IN

## 2024-12-03 DIAGNOSIS — S32.039D CLOSED FRACTURE OF THIRD LUMBAR VERTEBRA WITH ROUTINE HEALING, UNSPECIFIED FRACTURE MORPHOLOGY, SUBSEQUENT ENCOUNTER: ICD-10-CM

## 2024-12-03 DIAGNOSIS — Z09 HOSPITAL DISCHARGE FOLLOW-UP: Primary | ICD-10-CM

## 2024-12-03 DIAGNOSIS — E11.42 DIABETIC POLYNEUROPATHY ASSOCIATED WITH TYPE 2 DIABETES MELLITUS (H): Primary | ICD-10-CM

## 2024-12-03 DIAGNOSIS — G25.0 BENIGN FAMILIAL TREMOR: ICD-10-CM

## 2024-12-03 DIAGNOSIS — S32.2XXD CLOSED FRACTURE OF COCCYX WITH ROUTINE HEALING, SUBSEQUENT ENCOUNTER: ICD-10-CM

## 2024-12-03 LAB — TSH SERPL DL<=0.005 MIU/L-ACNC: 3.57 UIU/ML (ref 0.3–4.2)

## 2024-12-03 PROCEDURE — 80171 DRUG SCREEN QUANT GABAPENTIN: CPT | Mod: 90

## 2024-12-03 PROCEDURE — 82390 ASSAY OF CERULOPLASMIN: CPT

## 2024-12-03 PROCEDURE — 82525 ASSAY OF COPPER: CPT | Mod: 90

## 2024-12-03 PROCEDURE — 84443 ASSAY THYROID STIM HORMONE: CPT

## 2024-12-03 PROCEDURE — 99000 SPECIMEN HANDLING OFFICE-LAB: CPT

## 2024-12-03 PROCEDURE — 36415 COLL VENOUS BLD VENIPUNCTURE: CPT

## 2024-12-03 PROCEDURE — 99213 OFFICE O/P EST LOW 20 MIN: CPT | Performed by: PHYSICIAN ASSISTANT

## 2024-12-03 RX ORDER — ACETAMINOPHEN 500 MG
1000 TABLET ORAL EVERY 8 HOURS
COMMUNITY
Start: 2024-11-23

## 2024-12-03 RX ORDER — OXYCODONE HYDROCHLORIDE 5 MG/1
5-10 TABLET ORAL
COMMUNITY
Start: 2024-11-23

## 2024-12-03 RX ORDER — METHOCARBAMOL 500 MG/1
500-1000 TABLET, FILM COATED ORAL
COMMUNITY
Start: 2024-11-23

## 2024-12-03 ASSESSMENT — PAIN SCALES - GENERAL: PAINLEVEL_OUTOF10: MODERATE PAIN (4)
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
## 2024-12-04 LAB
COPPER SERPL-MCNC: 125.1 UG/DL
GABAPENTIN SERPLBLD-MCNC: 16.1 UG/ML

## 2024-12-05 LAB — CERULOPLASMIN SERPL-MCNC: 39 MG/DL (ref 20–60)

## 2024-12-18 ENCOUNTER — MYC MEDICAL ADVICE (OUTPATIENT)
Dept: FAMILY MEDICINE | Facility: CLINIC | Age: 64
End: 2024-12-18
Payer: COMMERCIAL

## 2024-12-19 NOTE — TELEPHONE ENCOUNTER
Form given back to tC with a note stating that the patient will need an appt,    Called to inform pt of this per pt seeing coty provider tomorrow she will see if this provider can fill out the form if not she will make an appt with dawit Booth    [facility]

## 2025-01-16 ENCOUNTER — OFFICE VISIT (OUTPATIENT)
Dept: FAMILY MEDICINE | Facility: CLINIC | Age: 65
End: 2025-01-16
Payer: COMMERCIAL

## 2025-01-16 VITALS
RESPIRATION RATE: 16 BRPM | TEMPERATURE: 98.2 F | OXYGEN SATURATION: 98 % | BODY MASS INDEX: 23.36 KG/M2 | HEIGHT: 65 IN | DIASTOLIC BLOOD PRESSURE: 70 MMHG | WEIGHT: 140.2 LBS | SYSTOLIC BLOOD PRESSURE: 103 MMHG | HEART RATE: 81 BPM

## 2025-01-16 DIAGNOSIS — S32.2XXD CLOSED FRACTURE OF COCCYX WITH ROUTINE HEALING, SUBSEQUENT ENCOUNTER: ICD-10-CM
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
## 2025-01-16 DIAGNOSIS — S32.039D CLOSED FRACTURE OF THIRD LUMBAR VERTEBRA WITH ROUTINE HEALING, UNSPECIFIED FRACTURE MORPHOLOGY, SUBSEQUENT ENCOUNTER: ICD-10-CM

## 2025-01-16 ASSESSMENT — ANXIETY QUESTIONNAIRES
GAD7 TOTAL SCORE: 1
8. IF YOU CHECKED OFF ANY PROBLEMS, HOW DIFFICULT HAVE THESE MADE IT FOR YOU TO DO YOUR WORK, TAKE CARE OF THINGS AT HOME, OR GET ALONG WITH OTHER PEOPLE?: SOMEWHAT DIFFICULT
7. FEELING AFRAID AS IF SOMETHING AWFUL MIGHT HAPPEN: NOT AT ALL
2. NOT BEING ABLE TO STOP OR CONTROL WORRYING: NOT AT ALL
3. WORRYING TOO MUCH ABOUT DIFFERENT THINGS: NOT AT ALL
6. BECOMING EASILY ANNOYED OR IRRITABLE: NOT AT ALL
4. TROUBLE RELAXING: NOT AT ALL
IF YOU CHECKED OFF ANY PROBLEMS ON THIS QUESTIONNAIRE, HOW DIFFICULT HAVE THESE PROBLEMS MADE IT FOR YOU TO DO YOUR WORK, TAKE CARE OF THINGS AT HOME, OR GET ALONG WITH OTHER PEOPLE: SOMEWHAT DIFFICULT
1. FEELING NERVOUS, ANXIOUS, OR ON EDGE: SEVERAL DAYS
GAD7 TOTAL SCORE: 1
5. BEING SO RESTLESS THAT IT IS HARD TO SIT STILL: NOT AT ALL
GAD7 TOTAL SCORE: 1
7. FEELING AFRAID AS IF SOMETHING AWFUL MIGHT HAPPEN: NOT AT ALL

## 2025-01-16 ASSESSMENT — PAIN SCALES - GENERAL: PAINLEVEL_OUTOF10: NO PAIN (0)
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
## 2025-01-25 ENCOUNTER — HEALTH MAINTENANCE LETTER (OUTPATIENT)
Age: 65
End: 2025-01-25

## 2025-03-04 ENCOUNTER — VIRTUAL VISIT (OUTPATIENT)
Dept: FAMILY MEDICINE | Facility: CLINIC | Age: 65
End: 2025-03-04
Payer: COMMERCIAL

## 2025-03-04 DIAGNOSIS — R68.89 FLU-LIKE SYMPTOMS: Primary | ICD-10-CM

## 2025-03-04 DIAGNOSIS — R11.2 NAUSEA AND VOMITING, UNSPECIFIED VOMITING TYPE: ICD-10-CM

## 2025-03-04 PROCEDURE — 98005 SYNCH AUDIO-VIDEO EST LOW 20: CPT | Performed by: PHYSICIAN ASSISTANT

## 2025-03-04 NOTE — PROGRESS NOTES
Lenore is a 64 year old who is being evaluated via a billable video visit.    How would you like to obtain your AVS? MyChart  If the video visit is dropped, the invitation should be resent by: Text to cell phone: [phone]  Will anyone else be joining your video visit? No      Assessment & Plan     Flu-like symptoms  Discussed with patient today that since it has been almost 2 weeks from her original influenza symptoms it is unlikely that her test will come back positive.  She has likely passed the viral illness.  And she is unlikely contagious.  However patient insist on getting swabbed for reassurance to give to her family that she is negative.  - Influenza A & B Antigen - Clinic Collect    Nausea and vomiting, unspecified vomiting type  Discussed with patient that since her symptoms have resolved over the last 4 to 5 days it is unclear if her stool culture will continuously be positive for the norovirus even if that was the original illness.  Unlikely that she is still in the infectious/contagious process.  However, patient insists that she get tested for reassurance to give her family.  - Enteric Bacteria and Virus Panel by [facility] Stool; Future                Subjective   Lenore is a 64 year old, presenting for the following health issues:  RECHECK    HPI      Concern - illness   Description: Patient was sick with influenza like symptoms about 2 weeks ago. It last about 1 week then her symptoms resolved. She had fever, body aches, chest congestion and cough. Then right after she got the stomach flu with nausea and vomiting for a few days. These symptoms resolved about 4 days ago. She has been feeling symptom free of URI symptoms for over a week and free of GI symptoms for 4 days. However, her Family is concerned and would like to have some clearance to confirm that patient is no longer contagious for influenza and think she may had the Norovirus.         Review of Systems  Constitutional, HEENT, cardiovascular,  pulmonary, gi and gu systems are negative, except as otherwise noted.      Objective           Vitals:  No vitals were obtained today due to virtual visit.    Physical Exam   GENERAL: alert and no distress  EYES: Eyes grossly normal to inspection.  No discharge or erythema, or obvious scleral/conjunctival abnormalities.  RESP: No audible wheeze, cough, or visible cyanosis.    SKIN: Visible skin clear. No significant rash, abnormal pigmentation or lesions.  NEURO: Cranial nerves grossly intact.  Mentation and speech appropriate for age.  PSYCH: Appropriate affect, tone, and pace of words          Video-Visit Details    Type of service:  Video Visit   Originating Location (pt. Location): Home    Distant Location (provider location):  On-site  Platform used for Video Visit: Logan  Signed Electronically by: DAPHNE Smith

## 2025-03-11 ENCOUNTER — OFFICE VISIT (OUTPATIENT)
Dept: NEUROLOGY | Facility: CLINIC | Age: 65
End: 2025-03-11
Payer: COMMERCIAL

## 2025-03-11 VITALS
WEIGHT: 140 LBS | BODY MASS INDEX: 23.32 KG/M2 | HEART RATE: 78 BPM | DIASTOLIC BLOOD PRESSURE: 51 MMHG | HEIGHT: 65 IN | SYSTOLIC BLOOD PRESSURE: 91 MMHG

## 2025-03-11 DIAGNOSIS — E11.42 DIABETIC POLYNEUROPATHY ASSOCIATED WITH TYPE 2 DIABETES MELLITUS (H): Primary | ICD-10-CM

## 2025-03-11 DIAGNOSIS — G25.0 BENIGN FAMILIAL TREMOR: ICD-10-CM

## 2025-03-11 PROBLEM — S32.030A CLOSED COMPRESSION FRACTURE OF L3 VERTEBRA (H): Status: ACTIVE | Noted: 2024-11-22

## 2025-03-11 PROBLEM — M65.312 TRIGGER THUMB OF LEFT HAND: Status: RESOLVED | Noted: 2023-12-18 | Resolved: 2025-03-11

## 2025-03-11 PROCEDURE — 3078F DIAST BP <80 MM HG: CPT | Performed by: PSYCHIATRY & NEUROLOGY

## 2025-03-11 PROCEDURE — 99214 OFFICE O/P EST MOD 30 MIN: CPT | Performed by: PSYCHIATRY & NEUROLOGY

## 2025-03-11 PROCEDURE — G2211 COMPLEX E/M VISIT ADD ON: HCPCS | Performed by: PSYCHIATRY & NEUROLOGY

## 2025-03-11 PROCEDURE — 3074F SYST BP LT 130 MM HG: CPT | Performed by: PSYCHIATRY & NEUROLOGY

## 2025-03-11 RX ORDER — PRIMIDONE 50 MG/1
50 TABLET ORAL AT BEDTIME
Qty: 30 TABLET | Refills: 11 | Status: SHIPPED | OUTPATIENT
Start: 2025-03-11

## 2025-03-11 RX ORDER — ASCORBIC ACID
CRYSTALS ORAL
COMMUNITY
Start: 2025-01-06

## 2025-03-11 NOTE — PROGRESS NOTES
NEUROLOGY FOLLOW UP VISIT  NOTE       [facility] NEUROLOGY [place]  [address] [place] [address]  Tel: [phone]  Fax: [phone]  [email or web address]     Lenore Martinez,  1960, MRN [id number]  PCP: Sudhakar Ledezma  Date: 2025      ASSESSMENT & PLAN     Visit Diagnosis  Diabetic polyneuropathy associated with type 2 diabetes mellitus (H)  Benign familial tremor     Benign familial tremor  65-year-old female with bipolar disorder, PTSD, diabetic polyneuropathy, benign familial tremor who is here to discuss treatment options as she feels her tremors are getting worse.  She has a strong family history.  Her mom, son have tremors.  Recently lab work included normal, copper, ceruloplasmin and TSH.  I have recommended:    1.  Start primidone 50 mg at bedtime  2.  Follow-up in 6 months    Diabetic polyneuropathy  64-year-old female with bipolar disorder, PTSD with diabetic polyneuropathy.  She has worked on losing weight and her most recent hemoglobin A1c was 5.0.  Although her neuropathic symptoms have improved she is reluctant to come down on the dose of gabapentin.  I have recommended:     1.  Continue gabapentin 1200 mg 3 times daily.  2.  She had a gabapentin level checked that was therapeutic  3.  Follow-up in 6 months    Thank you again for this referral, please feel free to contact me if you have any questions.    Kedar Conn MD  [facility]     HISTORY OF PRESENT ILLNESS     Patient is a 64-year-old female with bipolar disorder, PTSD, diabetic polyneuropathy, benign familial tremor last seen on 2020 for for diabetic polyneuropathy and essential tremor who returns for follow-up.  During her last visit she was continued on gabapentin 1200 mg 3 times daily and gabapentin level was checked that was therapeutic.  She also reported bilateral hand tremors and as she had a strong family history it was felt to be familial tremor and had  copper, ceruloplasmin and TSH level checked that was normal.  Initially patient suspected her symptoms were due to Wellbutrin and as she tried to decrease the dose of Wellbutrin her symptoms got worse. According to the patient recently her daughter was visiting and she commented her tremors were getting much more severe.  She is interested in trying some medication     PROBLEM LIST   Patient Active Problem List   Diagnosis    PTSD (post-traumatic stress disorder)    Hypovitaminosis D    Migraine with aura     Hyperlipidemia LDL goal <70    Major depression    Primary osteoarthritis of first carpometacarpal joint of left hand    History of colonic polyps    Gastroparesis    H/O total hysterectomy    Benign familial tremor    Closed compression fracture of L3 vertebra (H)         PAST MEDICAL & SURGICAL HISTORY     Past Medical History:   Patient  has a past medical history of Anxiety, Bipolar disease, chronic (H), Depressive disorder (2000), Diabetic neuropathy (H) (06/03/2021), Hyperlipidemia LDL goal <70, Hypovitaminosis D (05/12/2014), Iron deficiency, Migraines, Neuropathy, Primary osteoarthritis of first carpometacarpal joint of left hand (12/18/2023), and PTSD (post-traumatic stress disorder) (04/16/2013).    Surgical History:  She  has a past surgical history that includes hysterectomy, pap no longer indicated (10/1/2009); REMOVAL OF OVARIAN CYST(S) (1987); TOOTH EXTRACTION W/FORCEP (1987); Myringotomy; Colonoscopy (N/A, 11/4/2022); Esophagoscopy, gastroscopy, duodenoscopy (EGD), combined (N/A, 10/19/2023); and Arthroplasty carpometacarpal (thumb joint) (Left, 2/16/2024).     SOCIAL HISTORY     Reviewed, and she  reports that she has never smoked. She has never been exposed to tobacco smoke. She has never used smokeless tobacco. She reports current alcohol use. She reports that she does not use drugs.     FAMILY HISTORY     Reviewed, and family history includes Bipolar Disorder in her son; Cerebrovascular  "Disease in her maternal grandfather; Diabetes in her brother, paternal grandmother, and son; Hyperlipidemia in her mother; Mental Illness in her brother, brother, mother, paternal grandmother, sister, and son; Substance Abuse in her brother and brother; Thyroid Disease in her son; Tremor in her maternal grandfather and mother.     ALLERGIES     Allergies   Allergen Reactions    Compazine      Anxiety           REVIEW OF SYSTEMS     A 12 point review of system was performed and was negative except as outlined in the history of present illness.     HOME MEDICATIONS     Current Outpatient Rx   Medication Sig Dispense Refill    acetaminophen (TYLENOL) 500 MG tablet Take 1,000 mg by mouth every 8 hours.      Aspirin-Acetaminophen-Caffeine (EXCEDRIN MIGRAINE PO) Take  by mouth as needed.      buPROPion (WELLBUTRIN XL) 150 MG 24 hr tablet       citalopram (CELEXA) 20 MG tablet Take 20 mg by mouth 3 times daily      Cyanocobalamin (VITAMIN B 12) 250 MCG LOZG       gabapentin (NEURONTIN) 600 MG tablet Take 2 tablets (1,200 mg) by mouth 3 times daily. 540 tablet 3    lamoTRIgine (LAMICTAL) 100 MG tablet Take 100 mg by mouth 3 times daily.      primidone (MYSOLINE) 50 MG tablet Take 1 tablet (50 mg) by mouth at bedtime. 30 tablet 11    Specialty Vitamins Products (BRAIN PO) [employer] dynamic brain supplement           PHYSICAL EXAM     Vital signs  BP 91/51 (BP Location: Left arm, Patient Position: Sitting)   Pulse 78   Ht 1.651 m (5' 5\")   Wt 63.5 kg (140 lb)   LMP  (LMP Unknown)   BMI 23.30 kg/m      Weight:   140 lbs 0 oz    Patient is alert and oriented x4 in no acute distress. Vital signs were reviewed and are documented in electronic medical record. Neck was supple, no carotid bruits, thyromegaly, JVD, or lymphadenopathy was noted.   NEUROLOGY EXAM:   Patient s speech was normal with no aphasia or dysarthria. Mentation, and affect were also normal.    Funduscopic exam was normal, with normal cup to disc " ratio. Cranial nerves II -XII were intact.    Patient had normal mass, tone and motor strength was 5/5 in all extremities without pronator drift.    Sensation was decreased to light touch pinprick below knees   Reflexes were 1+ upper extremity absent in the lower   Dysmetria noted on FNF    Gait testing was normal.      PERTINENT DIAGNOSTIC STUDIES     Following studies were reviewed:     MRI BRAIN 7/9/2019  1.  No evidence of acute intracranial hemorrhage, mass effect, or infarction.  2.  Mild brain parenchymal volume loss.     EMG 10/26/2021  This is a borderline abnormal EEG that suggests length dependent mixed sensorimotor polyneuropathy.  This likely is due to patient's known prediabetes, clinical correlation is recommended.     EMG 8/21/2018  This is a normal study.  There is no electrophysiological evidence of a diffuse neuropathy, myopathy, or radiculpathy to affect the tested extremity muscles.      EMG 2/8/2011  This EMG nerve conduction study showed borderline reduced amplitude for the sural potentials, compatible with a very mild electrophysiological sensory neuropathy of the lower extremities.  Clinical correlation is recommended     PERTINENT LABS  Following labs were reviewed:  No visits with results within 3 Month(s) from this visit.   Latest known visit with results is:   Lab on 12/03/2024   Component Date Value Ref Range Status    Gabapentin 12/03/2024 16.1  2.0 - 20.0 ug/mL Final    Copper 12/03/2024 125.1  80.0 - 155.0 ug/dL Final    Ceruloplasmin 12/03/2024 39  20 - 60 mg/dL Final    TSH 12/03/2024 3.57  0.30 - 4.20 uIU/mL Final         Total time spent for face to face visit, reviewing labs/imaging studies, counseling and coordination of care was: 30 Minutes spent on the date of the encounter doing chart review, review of outside records, review of test results, interpretation of tests, patient visit, and documentation     The longitudinal plan of care for the diagnosis(es)/condition(s) as  documented were addressed during this visit. Due to the added complexity in care, I will continue to support Lenore in the subsequent management and with ongoing continuity of care.    This note was dictated using voice recognition software.  Any grammatical or context distortions are unintentional and inherent to the software.    No orders of the defined types were placed in this encounter.     New Prescriptions    PRIMIDONE (MYSOLINE) 50 MG TABLET    Take 1 tablet (50 mg) by mouth at bedtime.     Modified Medications    No medications on file

## 2025-03-11 NOTE — NURSING NOTE
Chief Complaint   Patient presents with    Tremors     Would like to discuss tremors (BUE)      Jud DIOR CMA on 3/11/2025 at 12:13 PM  [facility]

## 2025-03-11 NOTE — LETTER
3/11/2025      Lenore Martinez  [address]  [place] [address]      Dear Colleague,    Thank you for referring your patient, Lenore Martinez, to the [facility] [place]. Please see a copy of my visit note below.    NEUROLOGY FOLLOW UP VISIT  NOTE       [facility] [place]  [address] [place] [address]  Tel: [phone]  Fax: [phone]  [email or web address]     Lenore Martinez,  1960, MRN [id number]  PCP: Sudhakar Ledezma  Date: 2025      ASSESSMENT & PLAN     Visit Diagnosis  Diabetic polyneuropathy associated with type 2 diabetes mellitus (H)  Benign familial tremor     Benign familial tremor  65-year-old female with bipolar disorder, PTSD, diabetic polyneuropathy, benign familial tremor who is here to discuss treatment options as she feels her tremors are getting worse.  She has a strong family history.  Her mom, son have tremors.  Recently lab work included normal, copper, ceruloplasmin and TSH.  I have recommended:    1.  Start primidone 50 mg at bedtime  2.  Follow-up in 6 months    Diabetic polyneuropathy  64-year-old female with bipolar disorder, PTSD with diabetic polyneuropathy.  She has worked on losing weight and her most recent hemoglobin A1c was 5.0.  Although her neuropathic symptoms have improved she is reluctant to come down on the dose of gabapentin.  I have recommended:     1.  Continue gabapentin 1200 mg 3 times daily.  2.  She had a gabapentin level checked that was therapeutic  3.  Follow-up in 6 months    Thank you again for this referral, please feel free to contact me if you have any questions.    Kedar Conn MD  [facility], [place]     HISTORY OF PRESENT ILLNESS     Patient is a 64-year-old female with bipolar disorder, PTSD, diabetic polyneuropathy, benign familial tremor last seen on 2020 for for diabetic polyneuropathy and essential tremor who returns for follow-up.  During her  last visit she was continued on gabapentin 1200 mg 3 times daily and gabapentin level was checked that was therapeutic.  She also reported bilateral hand tremors and as she had a strong family history it was felt to be familial tremor and had copper, ceruloplasmin and TSH level checked that was normal.  Initially patient suspected her symptoms were due to Wellbutrin and as she tried to decrease the dose of Wellbutrin her symptoms got worse. According to the patient recently her daughter was visiting and she commented her tremors were getting much more severe.  She is interested in trying some medication     PROBLEM LIST   Patient Active Problem List   Diagnosis     PTSD (post-traumatic stress disorder)     Hypovitaminosis D     Migraine with aura      Hyperlipidemia LDL goal <70     Major depression     Primary osteoarthritis of first carpometacarpal joint of left hand     History of colonic polyps     Gastroparesis     H/O total hysterectomy     Benign familial tremor     Closed compression fracture of L3 vertebra (H)         PAST MEDICAL & SURGICAL HISTORY     Past Medical History:   Patient  has a past medical history of Anxiety, Bipolar disease, chronic (H), Depressive disorder (2000), Diabetic neuropathy (H) (06/03/2021), Hyperlipidemia LDL goal <70, Hypovitaminosis D (05/12/2014), Iron deficiency, Migraines, Neuropathy, Primary osteoarthritis of first carpometacarpal joint of left hand (12/18/2023), and PTSD (post-traumatic stress disorder) (04/16/2013).    Surgical History:  She  has a past surgical history that includes hysterectomy, pap no longer indicated (10/1/2009); REMOVAL OF OVARIAN CYST(S) (1987); TOOTH EXTRACTION W/FORCEP (1987); Myringotomy; Colonoscopy (N/A, 11/4/2022); Esophagoscopy, gastroscopy, duodenoscopy (EGD), combined (N/A, 10/19/2023); and Arthroplasty carpometacarpal (thumb joint) (Left, 2/16/2024).     SOCIAL HISTORY     Reviewed, and she  reports that she has never smoked. She has never  "been exposed to tobacco smoke. She has never used smokeless tobacco. She reports current alcohol use. She reports that she does not use drugs.     FAMILY HISTORY     Reviewed, and family history includes Bipolar Disorder in her son; Cerebrovascular Disease in her maternal grandfather; Diabetes in her brother, paternal grandmother, and son; Hyperlipidemia in her mother; Mental Illness in her brother, brother, mother, paternal grandmother, sister, and son; Substance Abuse in her brother and brother; Thyroid Disease in her son; Tremor in her maternal grandfather and mother.     ALLERGIES     Allergies   Allergen Reactions     Compazine      Anxiety           REVIEW OF SYSTEMS     A 12 point review of system was performed and was negative except as outlined in the history of present illness.     HOME MEDICATIONS     Current Outpatient Rx   Medication Sig Dispense Refill     acetaminophen (TYLENOL) 500 MG tablet Take 1,000 mg by mouth every 8 hours.       Aspirin-Acetaminophen-Caffeine (EXCEDRIN MIGRAINE PO) Take  by mouth as needed.       buPROPion (WELLBUTRIN XL) 150 MG 24 hr tablet        citalopram (CELEXA) 20 MG tablet Take 20 mg by mouth 3 times daily       Cyanocobalamin (VITAMIN B 12) 250 MCG LOZG        gabapentin (NEURONTIN) 600 MG tablet Take 2 tablets (1,200 mg) by mouth 3 times daily. 540 tablet 3     lamoTRIgine (LAMICTAL) 100 MG tablet Take 100 mg by mouth 3 times daily.       primidone (MYSOLINE) 50 MG tablet Take 1 tablet (50 mg) by mouth at bedtime. 30 tablet 11     Specialty Vitamins Products (BRAIN PO) [facility] [employer] dynamic brain supplement           PHYSICAL EXAM     Vital signs  BP 91/51 (BP Location: Left arm, Patient Position: Sitting)   Pulse 78   Ht 1.651 m (5' 5\")   Wt 63.5 kg (140 lb)   LMP  (LMP Unknown)   BMI 23.30 kg/m      Weight:   140 lbs 0 oz    Patient is alert and oriented x4 in no acute distress. Vital signs were reviewed and are documented in electronic medical record. Neck " was supple, no carotid bruits, thyromegaly, JVD, or lymphadenopathy was noted.   NEUROLOGY EXAM:    Patient s speech was normal with no aphasia or dysarthria. Mentation, and affect were also normal.     Funduscopic exam was normal, with normal cup to disc ratio. Cranial nerves II -XII were intact.     Patient had normal mass, tone and motor strength was 5/5 in all extremities without pronator drift.     Sensation was decreased to light touch pinprick below knees    Reflexes were 1+ upper extremity absent in the lower    Dysmetria noted on FNF     Gait testing was normal.      PERTINENT DIAGNOSTIC STUDIES     Following studies were reviewed:     MRI BRAIN 7/9/2019  1.  No evidence of acute intracranial hemorrhage, mass effect, or infarction.  2.  Mild brain parenchymal volume loss.     EMG 10/26/2021  This is a borderline abnormal EEG that suggests length dependent mixed sensorimotor polyneuropathy.  This likely is due to patient's known prediabetes, clinical correlation is recommended.     EMG 8/21/2018  This is a normal study.  There is no electrophysiological evidence of a diffuse neuropathy, myopathy, or radiculpathy to affect the tested extremity muscles.      EMG 2/8/2011  This EMG nerve conduction study showed borderline reduced amplitude for the sural potentials, compatible with a very mild electrophysiological sensory neuropathy of the lower extremities.  Clinical correlation is recommended     PERTINENT LABS  Following labs were reviewed:  No visits with results within 3 Month(s) from this visit.   Latest known visit with results is:   Lab on 12/03/2024   Component Date Value Ref Range Status     Gabapentin 12/03/2024 16.1  2.0 - 20.0 ug/mL Final     Copper 12/03/2024 125.1  80.0 - 155.0 ug/dL Final     Ceruloplasmin 12/03/2024 39  20 - 60 mg/dL Final     TSH 12/03/2024 3.57  0.30 - 4.20 uIU/mL Final         Total time spent for face to face visit, reviewing labs/imaging studies, counseling and coordination  of care was: 30 Minutes spent on the date of the encounter doing chart review, review of outside records, review of test results, interpretation of tests, patient visit, and documentation     The longitudinal plan of care for the diagnosis(es)/condition(s) as documented were addressed during this visit. Due to the added complexity in care, I will continue to support Lenore in the subsequent management and with ongoing continuity of care.    This note was dictated using voice recognition software.  Any grammatical or context distortions are unintentional and inherent to the software.    No orders of the defined types were placed in this encounter.     New Prescriptions    PRIMIDONE (MYSOLINE) 50 MG TABLET    Take 1 tablet (50 mg) by mouth at bedtime.     Modified Medications    No medications on file                 Again, thank you for allowing me to participate in the care of your patient.        Sincerely,        Kedar Conn MD    Electronically signed

## 2025-03-17 NOTE — DISCHARGE INSTRUCTIONS
You were given 975mg Tylenol at 6:45am. You may take more Tylenol after 12:45pm. Do not take more than 4000mg of Tylenol in a 24 hour period.     
.

## 2025-04-15 ENCOUNTER — PATIENT OUTREACH (OUTPATIENT)
Dept: CARE COORDINATION | Facility: CLINIC | Age: 65
End: 2025-04-15
Payer: COMMERCIAL

## 2025-04-29 ENCOUNTER — PATIENT OUTREACH (OUTPATIENT)
Dept: CARE COORDINATION | Facility: CLINIC | Age: 65
End: 2025-04-29
Payer: COMMERCIAL

## 2025-05-03 ENCOUNTER — HEALTH MAINTENANCE LETTER (OUTPATIENT)
Age: 65
End: 2025-05-03

## 2025-05-15 ENCOUNTER — PATIENT OUTREACH (OUTPATIENT)
Dept: GASTROENTEROLOGY | Facility: CLINIC | Age: 65
End: 2025-05-15
Payer: COMMERCIAL

## 2025-05-15 DIAGNOSIS — Z12.11 SPECIAL SCREENING FOR MALIGNANT NEOPLASMS, COLON: Primary | ICD-10-CM

## 2025-05-15 PROBLEM — D12.6 ADENOMATOUS COLON POLYP: Status: ACTIVE | Noted: 2025-05-15

## 2025-05-15 NOTE — PROGRESS NOTES
"CRC Screening Colonoscopy Referral Review    Patient meets the inclusion criteria for screening colonoscopy standing order.    Ordering/Referring Provider:  Sudhakar Ledezma      BMI: Estimated body mass index is 23.3 kg/m  as calculated from the following:    Height as of 3/11/25: 1.651 m (5' 5\").    Weight as of 3/11/25: 63.5 kg (140 lb).     Sedation:  Does patient have any of the following conditions affecting sedation?  Hx of severe PTSD, anxiety, or psychosis: MAC sedation recommended    Previous Scopes:  Any previous recommendations or follow up needs based on previous scope?  na / No recommendations.    Medical Concerns to Postpone Order:  Does patient have any of the following medical concerns that should postpone/delay colonoscopy referral?  No medical conditions affecting colonoscopy referral.    Final Referral Details:  Based on patient's medical history patient is appropriate for referral order with MAC/deep sedation.   BMI<= 45 45 < BMI <= 48 48 < BMI < = 50  BMI > 50   No Restrictions No [facility]  No [facility]  [place] [facility] with exceptions Hospital Only OR Only     "

## 2025-06-21 NOTE — PROGRESS NOTES
NEUROLOGY FOLLOW UP VISIT  NOTE       [facility] [place]  [address] [place] [address]  Tel: [phone]  Fax: [phone]  [email or web address]     Lenore Martinez,  1960, MRN [id number]  PCP: Sudhakar Ledezma  Date: 2025      ASSESSMENT & PLAN     Visit Diagnosis   Diabetic polyneuropathy associated with type 2 diabetes mellitus (H)  Benign familial tremor     Benign familial tremor  65-year-old female with bipolar disorder, PTSD, diabetic polyneuropathy use seen for benign familial tremor.  She was started on primidone and has noticed dramatic improvement in her tremors.  Previously she had some lab work including normal copper, ceruloplasmin and TSH.  I have recommended:    1.  Continue primidone 50 mg at bedtime  2.  Follow-up in 1 year    Diabetic polyneuropathy  65-year-old female with bipolar disorder, PTSD with diabetic polyneuropathy.  She has worked on losing weight and her hemoglobin A1c is in normal range.  For neuropathy I have recommended:    1.  Continue gabapentin 600 mg 2 tablets 3 times daily  2.  She had a gabapentin level checked in 2024 and was normal  3.  Follow-up in 1 year    Thank you again for this referral, please feel free to contact me if you have any questions.    Kedar Conn MD  [facility], [place]     HISTORY OF PRESENT ILLNESS     Patient is a 65-year-old female with bipolar disorder, PTSD, diabetic polyneuropathy, benign familial tremor who was last seen on 3/11/2025 and started on primidone.  For her diabetic polyneuropathy she was told to continue on gabapentin.  Since her last visit she reports significant improvement in her tremors and is quite pleased.  Her neuropathy is also stable..    In the past patient reported bilateral hand tremors and as she had a strong family history it was felt to be familial tremor.  She had copper, ceruloplasmin and TSH level checked that were normal.     PROBLEM LIST    Patient Active Problem List   Diagnosis    PTSD (post-traumatic stress disorder)    Hypovitaminosis D    Migraine with aura     Hyperlipidemia LDL goal <70    Major depression    History of colonic polyps    Gastroparesis    H/O total hysterectomy    Benign familial tremor    Closed compression fracture of L3 vertebra (H)         PAST MEDICAL & SURGICAL HISTORY     Past Medical History:   Patient  has a past medical history of Anxiety, Bipolar disease, chronic (H), Depressive disorder (2000), Diabetic neuropathy (H) (06/03/2021), Hyperlipidemia LDL goal <70, Hypovitaminosis D (05/12/2014), Iron deficiency, Migraines, Neuropathy, Primary osteoarthritis of first carpometacarpal joint of left hand (12/18/2023), and PTSD (post-traumatic stress disorder) (04/16/2013).    Surgical History:  She  has a past surgical history that includes hysterectomy, pap no longer indicated (10/1/2009); REMOVAL OF OVARIAN CYST(S) (1987); TOOTH EXTRACTION W/FORCEP (1987); Myringotomy; Colonoscopy (N/A, 11/4/2022); Esophagoscopy, gastroscopy, duodenoscopy (EGD), combined (N/A, 10/19/2023); and Arthroplasty carpometacarpal (thumb joint) (Left, 2/16/2024).     SOCIAL HISTORY     Reviewed, and she  reports that she has never smoked. She has never been exposed to tobacco smoke. She has never used smokeless tobacco. She reports current alcohol use. She reports that she does not use drugs.     FAMILY HISTORY     Reviewed, and family history includes Bipolar Disorder in her son; Cerebrovascular Disease in her maternal grandfather; Diabetes in her brother, paternal grandmother, and son; Hyperlipidemia in her mother; Mental Illness in her brother, brother, mother, paternal grandmother, sister, and son; Substance Abuse in her brother and brother; Thyroid Disease in her son; Tremor in her maternal grandfather and mother.     ALLERGIES     Allergies   Allergen Reactions    Compazine      Anxiety           REVIEW OF SYSTEMS     A 12 point review of  "system was performed and was negative except as outlined in the history of present illness.     HOME MEDICATIONS     Current Outpatient Rx   Medication Sig Dispense Refill    Aspirin-Acetaminophen-Caffeine (EXCEDRIN MIGRAINE PO) Take  by mouth as needed.      buPROPion (WELLBUTRIN XL) 150 MG 24 hr tablet       citalopram (CELEXA) 20 MG tablet Take 20 mg by mouth 3 times daily      Cyanocobalamin (VITAMIN B 12) 250 MCG LOZG       gabapentin (NEURONTIN) 600 MG tablet Take 2 tablets (1,200 mg) by mouth 3 times daily. 540 tablet 3    lamoTRIgine (LAMICTAL) 100 MG tablet Take 100 mg by mouth 3 times daily.      primidone (MYSOLINE) 50 MG tablet Take 1 tablet (50 mg) by mouth at bedtime. 90 tablet 3    Specialty Vitamins Products (BRAIN PO) [employer] dynamic brain supplement      acetaminophen (TYLENOL) 500 MG tablet Take 1,000 mg by mouth every 8 hours.           PHYSICAL EXAM     Vital signs  BP 99/58 (BP Location: Right arm, Patient Position: Sitting)   Pulse 69   Ht 1.651 m (5' 5\")   Wt 60.8 kg (134 lb)   LMP  (LMP Unknown)   BMI 22.30 kg/m      Weight:   134 lbs 0 oz    Patient is alert and oriented x4 in no acute distress. Vital signs were reviewed and are documented in electronic medical record. Neck was supple, no carotid bruits, thyromegaly, JVD, or lymphadenopathy was noted.   NEUROLOGY EXAM:   Patient s speech was normal with no aphasia or dysarthria. Mentation, and affect were also normal.    Funduscopic exam was normal, with normal cup to disc ratio. Cranial nerves II -XII were intact.    Patient had normal mass, tone and motor strength was 5/5 in all extremities without pronator drift.    Sensation was decreased to light touch pinprick below knees   Reflexes were 1+ upper extremity absent in the lower   Dysmetria noted on FNF    Gait testing was normal.      PERTINENT DIAGNOSTIC STUDIES     Following studies were reviewed:     MRI BRAIN 7/9/2019  1.  No evidence of acute intracranial hemorrhage, " mass effect, or infarction.  2.  Mild brain parenchymal volume loss.     EMG 10/26/2021  This is a borderline abnormal EEG that suggests length dependent mixed sensorimotor polyneuropathy.  This likely is due to patient's known prediabetes, clinical correlation is recommended.     EMG 8/21/2018  This is a normal study.  There is no electrophysiological evidence of a diffuse neuropathy, myopathy, or radiculpathy to affect the tested extremity muscles.      EMG 2/8/2011  This EMG nerve conduction study showed borderline reduced amplitude for the sural potentials, compatible with a very mild electrophysiological sensory neuropathy of the lower extremities.  Clinical correlation is recommended     PERTINENT LABS  Following labs were reviewed:  No visits with results within 3 Month(s) from this visit.   Latest known visit with results is:   Lab on 12/03/2024   Component Date Value Ref Range Status    Gabapentin 12/03/2024 16.1  2.0 - 20.0 ug/mL Final    Copper 12/03/2024 125.1  80.0 - 155.0 ug/dL Final    Ceruloplasmin 12/03/2024 39  20 - 60 mg/dL Final    TSH 12/03/2024 3.57  0.30 - 4.20 uIU/mL Final         Total time spent for face to face visit, reviewing labs/imaging studies, counseling and coordination of care was: 30 Minutes spent on the date of the encounter doing chart review, review of outside records, review of test results, interpretation of tests, patient visit, and documentation     The longitudinal plan of care for the diagnosis(es)/condition(s) as documented were addressed during this visit. Due to the added complexity in care, I will continue to support Lenagregg in the subsequent management and with ongoing continuity of care.    This note was dictated using voice recognition software.  Any grammatical or context distortions are unintentional and inherent to the software.    No orders of the defined types were placed in this encounter.     New Prescriptions    No medications on file     Modified Medications     Modified Medication Previous Medication    GABAPENTIN (NEURONTIN) 600 MG TABLET gabapentin (NEURONTIN) 600 MG tablet       Take 2 tablets (1,200 mg) by mouth 3 times daily.    Take 2 tablets (1,200 mg) by mouth 3 times daily.    PRIMIDONE (MYSOLINE) 50 MG TABLET primidone (MYSOLINE) 50 MG tablet       Take 1 tablet (50 mg) by mouth at bedtime.    Take 1 tablet (50 mg) by mouth at bedtime.

## 2025-06-23 ENCOUNTER — OFFICE VISIT (OUTPATIENT)
Dept: NEUROLOGY | Facility: CLINIC | Age: 65
End: 2025-06-23
Payer: COMMERCIAL

## 2025-06-23 VITALS
SYSTOLIC BLOOD PRESSURE: 99 MMHG | DIASTOLIC BLOOD PRESSURE: 58 MMHG | HEART RATE: 69 BPM | HEIGHT: 65 IN | BODY MASS INDEX: 22.33 KG/M2 | WEIGHT: 134 LBS

## 2025-06-23 DIAGNOSIS — E11.42 DIABETIC POLYNEUROPATHY ASSOCIATED WITH TYPE 2 DIABETES MELLITUS (H): Primary | ICD-10-CM

## 2025-06-23 DIAGNOSIS — G25.0 BENIGN FAMILIAL TREMOR: ICD-10-CM

## 2025-06-23 PROBLEM — M18.12 PRIMARY OSTEOARTHRITIS OF FIRST CARPOMETACARPAL JOINT OF LEFT HAND: Status: RESOLVED | Noted: 2023-12-18 | Resolved: 2025-06-23

## 2025-06-23 PROBLEM — D12.6 ADENOMATOUS COLON POLYP: Status: RESOLVED | Noted: 2025-05-15 | Resolved: 2025-06-23

## 2025-06-23 PROCEDURE — G2211 COMPLEX E/M VISIT ADD ON: HCPCS | Performed by: PSYCHIATRY & NEUROLOGY

## 2025-06-23 PROCEDURE — 3078F DIAST BP <80 MM HG: CPT | Performed by: PSYCHIATRY & NEUROLOGY

## 2025-06-23 PROCEDURE — 3074F SYST BP LT 130 MM HG: CPT | Performed by: PSYCHIATRY & NEUROLOGY

## 2025-06-23 PROCEDURE — 99214 OFFICE O/P EST MOD 30 MIN: CPT | Performed by: PSYCHIATRY & NEUROLOGY

## 2025-06-23 RX ORDER — GABAPENTIN 600 MG/1
1200 TABLET ORAL 3 TIMES DAILY
Qty: 540 TABLET | Refills: 3 | Status: SHIPPED | OUTPATIENT
Start: 2025-06-23

## 2025-06-23 RX ORDER — PRIMIDONE 50 MG/1
50 TABLET ORAL AT BEDTIME
Qty: 90 TABLET | Refills: 3 | Status: SHIPPED | OUTPATIENT
Start: 2025-06-23

## 2025-06-23 NOTE — LETTER
2025      Lenore Martinez  [address]  [place] [address]      Dear Colleague,    Thank you for referring your patient, Lenore Martinez, to the [facility] [place]. Please see a copy of my visit note below.    NEUROLOGY FOLLOW UP VISIT  NOTE       [facility] [place]  [address] [place] [address]  Tel: [phone]  Fax: [phone]  [email or web address]     Lenore Martinez,  1960, MRN [id number]  PCP: Sudhakar Ledezma  Date: 2025      ASSESSMENT & PLAN     Visit Diagnosis   Diabetic polyneuropathy associated with type 2 diabetes mellitus (H)  Benign familial tremor     Benign familial tremor  65-year-old female with bipolar disorder, PTSD, diabetic polyneuropathy use seen for benign familial tremor.  She was started on primidone and has noticed dramatic improvement in her tremors.  Previously she had some lab work including normal copper, ceruloplasmin and TSH.  I have recommended:    1.  Continue primidone 50 mg at bedtime  2.  Follow-up in 1 year    Diabetic polyneuropathy  65-year-old female with bipolar disorder, PTSD with diabetic polyneuropathy.  She has worked on losing weight and her hemoglobin A1c is in normal range.  For neuropathy I have recommended:    1.  Continue gabapentin 600 mg 2 tablets 3 times daily  2.  She had a gabapentin level checked in 2024 and was normal  3.  Follow-up in 1 year    Thank you again for this referral, please feel free to contact me if you have any questions.    Kedar Conn MD  [facility] NEUROLOGY, [place]     HISTORY OF PRESENT ILLNESS     Patient is a 65-year-old female with bipolar disorder, PTSD, diabetic polyneuropathy, benign familial tremor who was last seen on 3/11/2025 and started on primidone.  For her diabetic polyneuropathy she was told to continue on gabapentin.  Since her last visit she reports significant improvement in her tremors and is quite pleased.   Her neuropathy is also stable..    In the past patient reported bilateral hand tremors and as she had a strong family history it was felt to be familial tremor.  She had copper, ceruloplasmin and TSH level checked that were normal.     PROBLEM LIST   Patient Active Problem List   Diagnosis     PTSD (post-traumatic stress disorder)     Hypovitaminosis D     Migraine with aura      Hyperlipidemia LDL goal <70     Major depression     History of colonic polyps     Gastroparesis     H/O total hysterectomy     Benign familial tremor     Closed compression fracture of L3 vertebra (H)         PAST MEDICAL & SURGICAL HISTORY     Past Medical History:   Patient  has a past medical history of Anxiety, Bipolar disease, chronic (H), Depressive disorder (2000), Diabetic neuropathy (H) (06/03/2021), Hyperlipidemia LDL goal <70, Hypovitaminosis D (05/12/2014), Iron deficiency, Migraines, Neuropathy, Primary osteoarthritis of first carpometacarpal joint of left hand (12/18/2023), and PTSD (post-traumatic stress disorder) (04/16/2013).    Surgical History:  She  has a past surgical history that includes hysterectomy, pap no longer indicated (10/1/2009); REMOVAL OF OVARIAN CYST(S) (1987); TOOTH EXTRACTION W/FORCEP (1987); Myringotomy; Colonoscopy (N/A, 11/4/2022); Esophagoscopy, gastroscopy, duodenoscopy (EGD), combined (N/A, 10/19/2023); and Arthroplasty carpometacarpal (thumb joint) (Left, 2/16/2024).     SOCIAL HISTORY     Reviewed, and she  reports that she has never smoked. She has never been exposed to tobacco smoke. She has never used smokeless tobacco. She reports current alcohol use. She reports that she does not use drugs.     FAMILY HISTORY     Reviewed, and family history includes Bipolar Disorder in her son; Cerebrovascular Disease in her maternal grandfather; Diabetes in her brother, paternal grandmother, and son; Hyperlipidemia in her mother; Mental Illness in her brother, brother, mother, paternal grandmother, sister,  "and son; Substance Abuse in her brother and brother; Thyroid Disease in her son; Tremor in her maternal grandfather and mother.     ALLERGIES     Allergies   Allergen Reactions     Compazine      Anxiety           REVIEW OF SYSTEMS     A 12 point review of system was performed and was negative except as outlined in the history of present illness.     HOME MEDICATIONS     Current Outpatient Rx   Medication Sig Dispense Refill     Aspirin-Acetaminophen-Caffeine (EXCEDRIN MIGRAINE PO) Take  by mouth as needed.       buPROPion (WELLBUTRIN XL) 150 MG 24 hr tablet        citalopram (CELEXA) 20 MG tablet Take 20 mg by mouth 3 times daily       Cyanocobalamin (VITAMIN B 12) 250 MCG LOZG        gabapentin (NEURONTIN) 600 MG tablet Take 2 tablets (1,200 mg) by mouth 3 times daily. 540 tablet 3     lamoTRIgine (LAMICTAL) 100 MG tablet Take 100 mg by mouth 3 times daily.       primidone (MYSOLINE) 50 MG tablet Take 1 tablet (50 mg) by mouth at bedtime. 90 tablet 3     Specialty Vitamins Products (BRAIN PO) Skytide dynamic brain supplement       acetaminophen (TYLENOL) 500 MG tablet Take 1,000 mg by mouth every 8 hours.           PHYSICAL EXAM     Vital signs  BP 99/58 (BP Location: Right arm, Patient Position: Sitting)   Pulse 69   Ht 1.651 m (5' 5\")   Wt 60.8 kg (134 lb)   LMP  (LMP Unknown)   BMI 22.30 kg/m      Weight:   134 lbs 0 oz    Patient is alert and oriented x4 in no acute distress. Vital signs were reviewed and are documented in electronic medical record. Neck was supple, no carotid bruits, thyromegaly, JVD, or lymphadenopathy was noted.   NEUROLOGY EXAM:    Patient s speech was normal with no aphasia or dysarthria. Mentation, and affect were also normal.     Funduscopic exam was normal, with normal cup to disc ratio. Cranial nerves II -XII were intact.     Patient had normal mass, tone and motor strength was 5/5 in all extremities without pronator drift.     Sensation was decreased to light touch " pinprick below knees    Reflexes were 1+ upper extremity absent in the lower    Dysmetria noted on FNF     Gait testing was normal.      PERTINENT DIAGNOSTIC STUDIES     Following studies were reviewed:     MRI BRAIN 7/9/2019  1.  No evidence of acute intracranial hemorrhage, mass effect, or infarction.  2.  Mild brain parenchymal volume loss.     EMG 10/26/2021  This is a borderline abnormal EEG that suggests length dependent mixed sensorimotor polyneuropathy.  This likely is due to patient's known prediabetes, clinical correlation is recommended.     EMG 8/21/2018  This is a normal study.  There is no electrophysiological evidence of a diffuse neuropathy, myopathy, or radiculpathy to affect the tested extremity muscles.      EMG 2/8/2011  This EMG nerve conduction study showed borderline reduced amplitude for the sural potentials, compatible with a very mild electrophysiological sensory neuropathy of the lower extremities.  Clinical correlation is recommended     PERTINENT LABS  Following labs were reviewed:  No visits with results within 3 Month(s) from this visit.   Latest known visit with results is:   Lab on 12/03/2024   Component Date Value Ref Range Status     Gabapentin 12/03/2024 16.1  2.0 - 20.0 ug/mL Final     Copper 12/03/2024 125.1  80.0 - 155.0 ug/dL Final     Ceruloplasmin 12/03/2024 39  20 - 60 mg/dL Final     TSH 12/03/2024 3.57  0.30 - 4.20 uIU/mL Final         Total time spent for face to face visit, reviewing labs/imaging studies, counseling and coordination of care was: 30 Minutes spent on the date of the encounter doing chart review, review of outside records, review of test results, interpretation of tests, patient visit, and documentation     The longitudinal plan of care for the diagnosis(es)/condition(s) as documented were addressed during this visit. Due to the added complexity in care, I will continue to support Lenore in the subsequent management and with ongoing continuity of  care.    This note was dictated using voice recognition software.  Any grammatical or context distortions are unintentional and inherent to the software.    No orders of the defined types were placed in this encounter.     New Prescriptions    No medications on file     Modified Medications    Modified Medication Previous Medication    GABAPENTIN (NEURONTIN) 600 MG TABLET gabapentin (NEURONTIN) 600 MG tablet       Take 2 tablets (1,200 mg) by mouth 3 times daily.    Take 2 tablets (1,200 mg) by mouth 3 times daily.    PRIMIDONE (MYSOLINE) 50 MG TABLET primidone (MYSOLINE) 50 MG tablet       Take 1 tablet (50 mg) by mouth at bedtime.    Take 1 tablet (50 mg) by mouth at bedtime.                 Again, thank you for allowing me to participate in the care of your patient.        Sincerely,        Kedar Conn MD    Electronically signed

## 2025-06-23 NOTE — NURSING NOTE
Chief Complaint   Patient presents with    Follow Up     Patient states she is here for a 6 month follow up.  Was put on primidone last office and patient states that medication is working well for her.        Michelle Maya CMA on 6/23/2025 at 10:50 AM

## 2025-06-24 ENCOUNTER — TELEPHONE (OUTPATIENT)
Dept: FAMILY MEDICINE | Facility: CLINIC | Age: 65
End: 2025-06-24
Payer: COMMERCIAL

## 2025-06-24 NOTE — TELEPHONE ENCOUNTER
Patient Quality Outreach    Patient is due for the following:   Diabetes -  A1C, LDL (Fasting), Eye Exam, Microalbumin, and Foot Exam  Colon Cancer Screening  Physical Preventive Adult Physical      Topic Date Due    Diptheria Tetanus Pertussis (DTAP/TDAP/TD) Vaccine (4 - Td or Tdap) 12/16/2024       Action(s) Taken:   Schedule a Adult Preventative    Type of outreach:    Sent [employer] message.    Questions for provider review:    None         Mika Maya MA  Chart routed to None.

## 2025-06-24 NOTE — LETTER
July 14, 2025      Lenore Martinez  [address]  [place] [address]    Hello,     Your care team at [facility] values your health and well-being. After reviewing your chart, we have identified recommendation(s) to help you better manage your health.    It's time for your Medicare AWV. During your visit, we'll discuss your health, well-being, and any questions you may have related to preventive care. We'll also review any recommended tests, exams, or screenings you might need. To schedule please call [phone] ([phone]) or use your [employer] account.     If you recently had or are having any of these services soon, please contact the clinic via phone or [employer] so that your care team can update your records.    We look forward to seeing you at your upcoming visit.    If you have any questions or concerns, please contact our clinic. Thank you for continuing to trust us with your care.    Sincerely,    Your [facility] Care Team

## 2025-07-05 ENCOUNTER — HEALTH MAINTENANCE LETTER (OUTPATIENT)
Age: 65
End: 2025-07-05

## 2025-07-14 NOTE — TELEPHONE ENCOUNTER
Patient Quality Outreach    Patient is due for the following:   Diabetes -  A1C, LDL (Fasting), Eye Exam, and Microalbumin  Colon Cancer Screening  Physical Preventive Adult Physical      Topic Date Due    Diptheria Tetanus Pertussis (DTAP/TDAP/TD) Vaccine (4 - Td or Tdap) 12/16/2024       Action(s) Taken:   Schedule a Adult Preventative    Type of outreach:    Sent letter.    Questions for provider review:    None         Mika Maya MA  Chart routed to None.

## 2025-08-16 ENCOUNTER — HEALTH MAINTENANCE LETTER (OUTPATIENT)
Age: 65
End: 2025-08-16

## (undated) DEVICE — SOL WATER IRRIG 1000ML BOTTLE 07139-09

## (undated) DEVICE — PACK HAND WRIST SOP15HWFSP

## (undated) DEVICE — GLOVE BIOGEL PI MICRO INDICATOR UNDERGLOVE SZ 8.0 48980

## (undated) DEVICE — SOL WATER IRRIG 500ML BOTTLE 2F7113

## (undated) DEVICE — ENDO TRAP POLYP E-TRAP 00711099

## (undated) DEVICE — BNDG ELASTIC 4"X5YDS UNSTERILE 6611-40

## (undated) DEVICE — SOL PRP PVP IOD .75OZ PCH PKT CNTNR STRL DYNDA2232A

## (undated) DEVICE — GLOVE BIOGEL INDICATOR 7.5 LF 41675

## (undated) DEVICE — ASSURANCE CLIP

## (undated) DEVICE — CAST PADDING 4" UNSTERILE 9044

## (undated) DEVICE — CAST PLASTER SPLINT 4X15" 7394

## (undated) DEVICE — SUCTION MANIFOLD NEPTUNE 2 SYS 1 PORT 702-025-000

## (undated) DEVICE — SU VICRYL 0 CT-2 27" UND J270H

## (undated) DEVICE — SU PROLENE 3-0 PS-2 18" 8687H

## (undated) DEVICE — ENDO SNARE EXACTO COLD 9MM LOOP 2.4MMX230CM 00711115

## (undated) DEVICE — TUBING SUCTION 12"X1/4" N612

## (undated) DEVICE — GLOVE BIOGEL PI SZ 7.5 40875

## (undated) DEVICE — SU VICRYL 3-0 PS-2 18" UND J497H

## (undated) DEVICE — SNARE CAPIVATOR ROUND COLD SNR BX10 M00561101

## (undated) DEVICE — SU VICRYL 2-0 RB-1 27" J306H

## (undated) DEVICE — GOWN IMPERVIOUS 2XL BLUE

## (undated) DEVICE — DRSG STERI STRIP 1/2X4" R1547

## (undated) DEVICE — KIT ENDO TURNOVER/PROCEDURE CARRY-ON 101822

## (undated) DEVICE — NDL SCLEROTHERAPY 25GA CARR-LOCK  00711811

## (undated) DEVICE — ESU GROUND PAD ADULT W/CORD E7507

## (undated) DEVICE — PREP CHLORAPREP 26ML TINTED ORANGE  260815

## (undated) DEVICE — DRAPE STERI TOWEL SM 1000

## (undated) DEVICE — SPECIMEN CONTAINER 3OZ W/FORMALIN 59901

## (undated) RX ORDER — CEFAZOLIN SODIUM 2 G/50ML
SOLUTION INTRAVENOUS
Status: DISPENSED
Start: 2024-02-16

## (undated) RX ORDER — FENTANYL CITRATE 50 UG/ML
INJECTION, SOLUTION INTRAMUSCULAR; INTRAVENOUS
Status: DISPENSED
Start: 2024-02-16

## (undated) RX ORDER — BUPIVACAINE HYDROCHLORIDE 2.5 MG/ML
INJECTION, SOLUTION INFILTRATION; PERINEURAL
Status: DISPENSED
Start: 2024-02-16

## (undated) RX ORDER — ONDANSETRON 2 MG/ML
INJECTION INTRAMUSCULAR; INTRAVENOUS
Status: DISPENSED
Start: 2024-02-16

## (undated) RX ORDER — PROPOFOL 10 MG/ML
INJECTION, EMULSION INTRAVENOUS
Status: DISPENSED
Start: 2024-02-16

## (undated) RX ORDER — ACETAMINOPHEN 325 MG/1
TABLET ORAL
Status: DISPENSED
Start: 2024-02-16

## (undated) RX ORDER — DEXAMETHASONE SODIUM PHOSPHATE 4 MG/ML
INJECTION, SOLUTION INTRA-ARTICULAR; INTRALESIONAL; INTRAMUSCULAR; INTRAVENOUS; SOFT TISSUE
Status: DISPENSED
Start: 2024-02-16

## (undated) RX ORDER — BUPIVACAINE HYDROCHLORIDE 5 MG/ML
INJECTION, SOLUTION EPIDURAL; INTRACAUDAL
Status: DISPENSED
Start: 2024-02-16

## (undated) RX ORDER — LIDOCAINE HYDROCHLORIDE 10 MG/ML
INJECTION, SOLUTION EPIDURAL; INFILTRATION; INTRACAUDAL; PERINEURAL
Status: DISPENSED
Start: 2024-02-16